# Patient Record
Sex: FEMALE | Race: WHITE | NOT HISPANIC OR LATINO | Employment: OTHER | URBAN - METROPOLITAN AREA
[De-identification: names, ages, dates, MRNs, and addresses within clinical notes are randomized per-mention and may not be internally consistent; named-entity substitution may affect disease eponyms.]

---

## 2017-07-06 ENCOUNTER — CONVERSION ENCOUNTER (OUTPATIENT)
Dept: OTHER | Facility: HOSPITAL | Age: 72
End: 2017-07-06

## 2017-07-07 ENCOUNTER — CONVERSION ENCOUNTER (OUTPATIENT)
Dept: OTHER | Facility: HOSPITAL | Age: 72
End: 2017-07-07

## 2017-07-07 LAB
ALBUMIN SERPL-MCNC: 4.4 G/DL (ref 3.6–5.1)
ALBUMIN/GLOB SERPL: ABNORMAL {RATIO} (ref 1–2.5)
ALP SERPL-CCNC: 95 UNITS/L (ref 33–130)
ALT SERPL-CCNC: 14 UNITS/L (ref 6–29)
AST SERPL-CCNC: 22 UNITS/L (ref 10–35)
BASOPHILS # BLD AUTO: ABNORMAL 10*3/MM3 (ref 0–200)
BASOPHILS NFR BLD AUTO: 0.3 %
BILIRUB SERPL-MCNC: 0.8 MG/DL (ref 0.2–1.2)
BUN SERPL-MCNC: 18 MG/DL (ref 7–25)
BUN/CREAT SERPL: ABNORMAL (ref 6–22)
CALCIUM SERPL-MCNC: 9.5 MG/DL (ref 8.6–10.4)
CHLORIDE SERPL-SCNC: 108 MMOL/L (ref 98–110)
CO2 CONTENT VENOUS: 23 MMOL/L (ref 20–31)
CONV NEUTROPHILS/100 LEUKOCYTES IN BODY FLUID BY MANUAL COUNT: 65.9 %
CONV TOTAL PROTEIN: 7.1 G/DL (ref 6.1–8.1)
CREAT UR-MCNC: 0.51 MG/DL (ref 0.6–0.93)
EOSINOPHIL # BLD AUTO: 1.2 %
EOSINOPHIL # BLD AUTO: ABNORMAL 10*3/MM3 (ref 15–500)
ERYTHROCYTE [DISTWIDTH] IN BLOOD BY AUTOMATED COUNT: 12.9 % (ref 11–15)
FOLATE SERPL-MCNC: 20.2 NG/ML
GLOBULIN UR ELPH-MCNC: ABNORMAL G/DL (ref 1.9–3.7)
GLUCOSE SERPL-MCNC: 79 MG/DL (ref 65–99)
HCT VFR BLD AUTO: 42.6 % (ref 35–45)
HGB BLD-MCNC: 14.1 G/DL (ref 11.7–15.5)
LYMPHOCYTES # BLD AUTO: ABNORMAL 10*3/MM3 (ref 850–3900)
LYMPHOCYTES NFR BLD AUTO: 27 %
MCH RBC QN AUTO: 28.8 PG (ref 27–33)
MCHC RBC AUTO-ENTMCNC: ABNORMAL % (ref 32–36)
MCV RBC AUTO: 87.1 FL (ref 80–100)
MONOCYTES # BLD AUTO: ABNORMAL 10*3/MICROLITER (ref 200–950)
MONOCYTES NFR BLD AUTO: 5.6 %
NEUTROPHILS # BLD AUTO: ABNORMAL 10*3/MM3 (ref 1500–7800)
PLATELET # BLD AUTO: ABNORMAL 10*3/MM3 (ref 140–400)
PMV BLD AUTO: 10.6 FL (ref 7.5–12.5)
POTASSIUM SERPL-SCNC: 3.9 MMOL/L (ref 3.5–5.3)
RBC # BLD AUTO: ABNORMAL 10*6/MM3 (ref 3.8–5.1)
SODIUM SERPL-SCNC: 142 MMOL/L (ref 135–146)
TSH SERPL-ACNC: 1.22 MICROINTL UNITS/ML (ref 0.4–4.5)
VIT B12 SERPL-MCNC: 224 PG/ML (ref 200–1100)
WBC # BLD AUTO: ABNORMAL K/UL (ref 3.8–10.8)

## 2017-07-10 ENCOUNTER — HOSPITAL ENCOUNTER (OUTPATIENT)
Dept: MRI IMAGING | Facility: HOSPITAL | Age: 72
Discharge: HOME | End: 2017-07-10
Attending: FAMILY MEDICINE | Admitting: FAMILY MEDICINE

## 2017-09-08 ENCOUNTER — CONVERSION ENCOUNTER (OUTPATIENT)
Dept: OTHER | Facility: HOSPITAL | Age: 72
End: 2017-09-08

## 2017-10-17 ENCOUNTER — CONVERSION ENCOUNTER (OUTPATIENT)
Dept: OTHER | Facility: HOSPITAL | Age: 72
End: 2017-10-17

## 2018-01-09 ENCOUNTER — CONVERSION ENCOUNTER (OUTPATIENT)
Dept: OTHER | Facility: HOSPITAL | Age: 73
End: 2018-01-09

## 2018-04-17 ENCOUNTER — CONVERSION ENCOUNTER (OUTPATIENT)
Dept: OTHER | Facility: HOSPITAL | Age: 73
End: 2018-04-17

## 2018-05-31 ENCOUNTER — HOSPITAL ENCOUNTER (OUTPATIENT)
Dept: PREADMISSION TESTING | Facility: HOSPITAL | Age: 73
Discharge: HOME | End: 2018-05-31
Attending: OBSTETRICS & GYNECOLOGY | Admitting: OBSTETRICS & GYNECOLOGY

## 2018-05-31 LAB
ANION GAP SERPL CALC-SCNC: 10.7 MMOL/L (ref 10–20)
BACTERIA SPEC AEROBE CULT: NORMAL
BASOPHILS # BLD AUTO: 0 10*3/UL (ref 0–0.2)
BASOPHILS NFR BLD AUTO: 0 % (ref 0–2)
BILIRUB UR QL STRIP: NEGATIVE MG/DL
BUN SERPL-MCNC: 13 MG/DL (ref 8–20)
BUN/CREAT SERPL: 21.7 (ref 5.4–26.2)
CALCIUM SERPL-MCNC: 8.7 MG/DL (ref 8.9–10.3)
CASTS URNS QL MICRO: ABNORMAL /[LPF]
CHLORIDE SERPL-SCNC: 105 MMOL/L (ref 101–111)
COLOR UR: YELLOW
CONV BACTERIA IN URINE MICRO: NEGATIVE
CONV CLARITY OF URINE: CLEAR
CONV CO2: 28 MMOL/L (ref 22–32)
CONV HYALINE CASTS IN URINE MICRO: ABNORMAL /[LPF] (ref 0–5)
CONV PROTEIN IN URINE BY AUTOMATED TEST STRIP: NEGATIVE MG/DL
CONV SMALL ROUND CELLS: ABNORMAL /[HPF]
CONV UROBILINOGEN IN URINE BY AUTOMATED TEST STRIP: 0.2 MG/DL
CREAT UR-MCNC: 0.6 MG/DL (ref 0.4–1)
CULTURE INDICATED?: ABNORMAL
DIFFERENTIAL METHOD BLD: (no result)
EOSINOPHIL # BLD AUTO: 0 10*3/UL (ref 0–0.3)
EOSINOPHIL # BLD AUTO: 1 % (ref 0–3)
ERYTHROCYTE [DISTWIDTH] IN BLOOD BY AUTOMATED COUNT: 14.2 % (ref 11.5–14.5)
GLUCOSE SERPL-MCNC: 92 MG/DL (ref 65–99)
GLUCOSE UR QL: NEGATIVE MG/DL
HCT VFR BLD AUTO: 40 % (ref 35–49)
HGB BLD-MCNC: 13.2 G/DL (ref 12–15)
HGB UR QL STRIP: ABNORMAL
KETONES UR QL STRIP: NEGATIVE MG/DL
LEUKOCYTE ESTERASE UR QL STRIP: ABNORMAL
LYMPHOCYTES # BLD AUTO: 1.3 10*3/UL (ref 0.8–4.8)
LYMPHOCYTES NFR BLD AUTO: 22 % (ref 18–42)
Lab: NORMAL
MCH RBC QN AUTO: 29.8 PG (ref 26–32)
MCHC RBC AUTO-ENTMCNC: 33.1 G/DL (ref 32–36)
MCV RBC AUTO: 90.1 FL (ref 80–94)
MICRO REPORT STATUS: NORMAL
MONOCYTES # BLD AUTO: 0.3 10*3/UL (ref 0.1–1.3)
MONOCYTES NFR BLD AUTO: 5 % (ref 2–11)
NEUTROPHILS # BLD AUTO: 4.2 10*3/UL (ref 2.3–8.6)
NEUTROPHILS NFR BLD AUTO: 72 % (ref 50–75)
NITRITE UR QL STRIP: NEGATIVE
NRBC BLD AUTO-RTO: 0 /100{WBCS}
NRBC/RBC NFR BLD MANUAL: 0 10*3/UL
PH UR STRIP.AUTO: 6 [PH] (ref 4.5–8)
PLATELET # BLD AUTO: 145 10*3/UL (ref 150–450)
PMV BLD AUTO: 8.6 FL (ref 7.4–10.4)
POTASSIUM SERPL-SCNC: 3.7 MMOL/L (ref 3.6–5.1)
RBC # BLD AUTO: 4.44 10*6/UL (ref 4–5.4)
RBC #/AREA URNS HPF: 2 /[HPF] (ref 0–3)
SODIUM SERPL-SCNC: 140 MMOL/L (ref 136–144)
SP GR UR: 1.02 (ref 1–1.03)
SPECIMEN SOURCE: NORMAL
SPERM URNS QL MICRO: ABNORMAL /[HPF]
SQUAMOUS SPT QL MICRO: 13 /[HPF] (ref 0–5)
UNIDENT CRYS URNS QL MICRO: ABNORMAL /[HPF]
WBC # BLD AUTO: 5.8 10*3/UL (ref 4.5–11.5)
WBC #/AREA URNS HPF: 26 /[HPF] (ref 0–5)
YEAST SPEC QL WET PREP: ABNORMAL /[HPF]

## 2018-06-04 ENCOUNTER — HOSPITAL ENCOUNTER (OUTPATIENT)
Dept: OTHER | Facility: HOSPITAL | Age: 73
Setting detail: SPECIMEN
Discharge: HOME | End: 2018-06-04
Attending: OBSTETRICS & GYNECOLOGY | Admitting: OBSTETRICS & GYNECOLOGY

## 2018-08-13 ENCOUNTER — OFFICE (OUTPATIENT)
Dept: URBAN - METROPOLITAN AREA CLINIC 64 | Facility: CLINIC | Age: 73
End: 2018-08-13

## 2018-08-13 VITALS
SYSTOLIC BLOOD PRESSURE: 111 MMHG | DIASTOLIC BLOOD PRESSURE: 56 MMHG | HEIGHT: 62 IN | WEIGHT: 134 LBS | HEART RATE: 69 BPM

## 2018-08-13 DIAGNOSIS — Z12.11 ENCOUNTER FOR SCREENING FOR MALIGNANT NEOPLASM OF COLON: ICD-10-CM

## 2018-08-13 DIAGNOSIS — K83.9 DISEASE OF BILIARY TRACT, UNSPECIFIED: ICD-10-CM

## 2018-08-13 PROCEDURE — 99204 OFFICE O/P NEW MOD 45 MIN: CPT | Performed by: INTERNAL MEDICINE

## 2018-10-22 ENCOUNTER — CONVERSION ENCOUNTER (OUTPATIENT)
Dept: FAMILY MEDICINE CLINIC | Facility: CLINIC | Age: 73
End: 2018-10-22

## 2018-10-23 LAB
ALBUMIN SERPL-MCNC: 3.9 G/DL (ref 3.6–5.1)
ALP SERPL-CCNC: 90 U/L (ref 33–130)
AST SERPL-CCNC: 21 U/L (ref 10–35)
BILIRUB SERPL-MCNC: 0.6 MG/DL (ref 0.2–1.2)
BUN SERPL-MCNC: 15 MG/DL (ref 7–25)
BUN/CREAT SERPL: 30 (CALC) (ref 6–22)
CALCIUM SERPL-MCNC: 9 MG/DL (ref 8.6–10.2)
CHLORIDE SERPL-SCNC: 104 MMOL/L (ref 98–110)
CHOLEST SERPL-MCNC: 179 MG/DL (ref 125–200)
CONV CO2: 26 MMOL/L (ref 21–33)
CONV TOTAL PROTEIN: 6.4 G/DL (ref 6.2–8.3)
CREAT UR-MCNC: 0.5 MG/DL (ref 0.63–1.22)
EOSINOPHIL # BLD AUTO: 1 %
EOSINOPHIL # BLD AUTO: 100 CELLS/UL (ref 15–500)
ERYTHROCYTE [DISTWIDTH] IN BLOOD BY AUTOMATED COUNT: 14.2 % (ref 11–15)
FOLATE SERPL-MCNC: 13.7 NG/ML
GLOBULIN UR ELPH-MCNC: 2.5 MG/DL (ref 2.2–3.9)
GLUCOSE UR QL: 97 MG/DL (ref 65–99)
HCT VFR BLD AUTO: 40.6 % (ref 35–45)
HDLC SERPL-MCNC: 73 MG/DL
HGB BLD-MCNC: 13.5 G/DL (ref 11.7–15.5)
INSULIN SERPL-ACNC: 1.6 (CALC) (ref 1–2.1)
LDLC SERPL CALC-MCNC: 95 MG/DL
LYMPHOCYTES # BLD AUTO: 1700 CELLS/UL (ref 850–3900)
LYMPHOCYTES NFR BLD AUTO: 24 %
MCH RBC QN AUTO: 29.9 PG (ref 27–33)
MCHC RBC AUTO-ENTMCNC: 33.3 G/DL (ref 32–36)
MCV RBC AUTO: 90 FL (ref 80–100)
MONOCYTES # BLD AUTO: 400 CELLS/UL (ref 200–950)
MONOCYTES NFR BLD AUTO: 5 %
NEUTROPHILS # BLD AUTO: 4900 CELLS/UL (ref 1500–7800)
NEUTROPHILS NFR BLD AUTO: 69 %
PLATELET # BLD AUTO: 148 10*3/UL (ref 140–400)
PMV BLD AUTO: 9.1 FL (ref 7.5–11.5)
POTASSIUM SERPL-SCNC: 3.8 MMOL/L (ref 3.5–5.3)
RBC # BLD AUTO: 4.51 MILLION/UL (ref 3.8–5.1)
SODIUM SERPL-SCNC: 141 MMOL/L (ref 135–146)
TRIGL SERPL-MCNC: 56 MG/DL
TSH SERPL-ACNC: 0.8 MIU/L (ref 0.4–4.5)
VIT B12 SERPL-MCNC: 202 PG/ML (ref 200–1100)
WBC # BLD AUTO: 7.1 10*3/UL (ref 3.8–10.8)

## 2018-11-12 ENCOUNTER — OFFICE (OUTPATIENT)
Dept: URBAN - METROPOLITAN AREA CLINIC 64 | Facility: CLINIC | Age: 73
End: 2018-11-12

## 2018-11-12 VITALS
WEIGHT: 132 LBS | DIASTOLIC BLOOD PRESSURE: 72 MMHG | HEART RATE: 70 BPM | HEIGHT: 62 IN | SYSTOLIC BLOOD PRESSURE: 110 MMHG

## 2018-11-12 DIAGNOSIS — Z12.11 ENCOUNTER FOR SCREENING FOR MALIGNANT NEOPLASM OF COLON: ICD-10-CM

## 2018-11-12 DIAGNOSIS — K83.9 DISEASE OF BILIARY TRACT, UNSPECIFIED: ICD-10-CM

## 2018-11-12 PROCEDURE — 99213 OFFICE O/P EST LOW 20 MIN: CPT | Performed by: INTERNAL MEDICINE

## 2019-02-12 ENCOUNTER — CONVERSION ENCOUNTER (OUTPATIENT)
Dept: OTHER | Facility: HOSPITAL | Age: 74
End: 2019-02-12

## 2019-02-27 ENCOUNTER — HOSPITAL ENCOUNTER (OUTPATIENT)
Dept: OTHER | Facility: HOSPITAL | Age: 74
Discharge: HOME | End: 2019-02-27
Attending: FAMILY MEDICINE | Admitting: FAMILY MEDICINE

## 2019-06-04 VITALS
SYSTOLIC BLOOD PRESSURE: 120 MMHG | WEIGHT: 138 LBS | BODY MASS INDEX: 24.45 KG/M2 | OXYGEN SATURATION: 96 % | SYSTOLIC BLOOD PRESSURE: 115 MMHG | HEART RATE: 73 BPM | OXYGEN SATURATION: 95 % | DIASTOLIC BLOOD PRESSURE: 77 MMHG | RESPIRATION RATE: 16 BRPM | WEIGHT: 141.5 LBS | SYSTOLIC BLOOD PRESSURE: 113 MMHG | OXYGEN SATURATION: 96 % | WEIGHT: 129.5 LBS | WEIGHT: 136 LBS | RESPIRATION RATE: 16 BRPM | SYSTOLIC BLOOD PRESSURE: 119 MMHG | HEIGHT: 61 IN | DIASTOLIC BLOOD PRESSURE: 72 MMHG | HEART RATE: 78 BPM | SYSTOLIC BLOOD PRESSURE: 107 MMHG | OXYGEN SATURATION: 98 % | BODY MASS INDEX: 25.68 KG/M2 | HEART RATE: 80 BPM | DIASTOLIC BLOOD PRESSURE: 80 MMHG | HEIGHT: 61 IN | HEART RATE: 76 BPM | RESPIRATION RATE: 16 BRPM | HEIGHT: 61 IN | RESPIRATION RATE: 16 BRPM | WEIGHT: 140 LBS | BODY MASS INDEX: 26.43 KG/M2 | BODY MASS INDEX: 26.06 KG/M2 | DIASTOLIC BLOOD PRESSURE: 80 MMHG | OXYGEN SATURATION: 99 % | HEART RATE: 78 BPM | DIASTOLIC BLOOD PRESSURE: 70 MMHG | DIASTOLIC BLOOD PRESSURE: 81 MMHG | WEIGHT: 143 LBS | HEIGHT: 61 IN | HEART RATE: 88 BPM | RESPIRATION RATE: 16 BRPM | SYSTOLIC BLOOD PRESSURE: 121 MMHG

## 2019-07-12 ENCOUNTER — OFFICE VISIT (OUTPATIENT)
Dept: FAMILY MEDICINE CLINIC | Facility: CLINIC | Age: 74
End: 2019-07-12

## 2019-07-12 VITALS
RESPIRATION RATE: 18 BRPM | WEIGHT: 137.4 LBS | HEART RATE: 67 BPM | BODY MASS INDEX: 24.34 KG/M2 | HEIGHT: 63 IN | TEMPERATURE: 97.1 F | SYSTOLIC BLOOD PRESSURE: 102 MMHG | DIASTOLIC BLOOD PRESSURE: 70 MMHG | OXYGEN SATURATION: 98 %

## 2019-07-12 DIAGNOSIS — R19.7 DIARRHEA, UNSPECIFIED TYPE: ICD-10-CM

## 2019-07-12 DIAGNOSIS — R41.3 MEMORY LOSS: ICD-10-CM

## 2019-07-12 DIAGNOSIS — R35.0 URINARY FREQUENCY: Primary | ICD-10-CM

## 2019-07-12 DIAGNOSIS — K62.5 BRIGHT RED BLOOD PER RECTUM: ICD-10-CM

## 2019-07-12 LAB
BILIRUB BLD-MCNC: NEGATIVE MG/DL
CLARITY, POC: CLEAR
COLOR UR: YELLOW
GLUCOSE UR STRIP-MCNC: NEGATIVE MG/DL
KETONES UR QL: NEGATIVE
LEUKOCYTE EST, POC: NEGATIVE
NITRITE UR-MCNC: NEGATIVE MG/ML
PH UR: 5 [PH] (ref 5–8)
PROT UR STRIP-MCNC: NEGATIVE MG/DL
RBC # UR STRIP: ABNORMAL /UL
SP GR UR: 1.01 (ref 1–1.03)
UROBILINOGEN UR QL: NORMAL

## 2019-07-12 PROCEDURE — 81002 URINALYSIS NONAUTO W/O SCOPE: CPT | Performed by: FAMILY MEDICINE

## 2019-07-12 PROCEDURE — 99214 OFFICE O/P EST MOD 30 MIN: CPT | Performed by: FAMILY MEDICINE

## 2019-07-12 RX ORDER — GALANTAMINE HYDROBROMIDE 4 MG/1
1 TABLET, FILM COATED ORAL EVERY 12 HOURS
COMMUNITY
Start: 2018-01-09 | End: 2019-07-18 | Stop reason: SINTOL

## 2019-07-12 RX ORDER — CITALOPRAM 20 MG/1
1 TABLET ORAL DAILY
COMMUNITY
Start: 2017-07-06 | End: 2019-11-18 | Stop reason: SDUPTHER

## 2019-07-12 RX ORDER — ALENDRONATE SODIUM 70 MG/1
70 TABLET ORAL
COMMUNITY
End: 2020-03-25

## 2019-07-12 RX ORDER — MEMANTINE HYDROCHLORIDE 5 MG/1
10 TABLET ORAL DAILY
COMMUNITY
Start: 2018-01-09 | End: 2020-03-31

## 2019-07-12 NOTE — PROGRESS NOTES
Subjective   Salud Gama is a 74 y.o. female.     Salud was last seen in office by Dr. Anay Rosales on 04/09/2019 for urinary tract infection. She was prescribed Bactrim with good relief.      Urinary Tract Infection    This is a chronic problem. The current episode started more than 1 month ago. The problem has been waxing and waning. The quality of the pain is described as burning. The pain is at a severity of 4/10. The pain is moderate. There has been no fever. Associated symptoms include frequency, hematuria and urgency. Pertinent negatives include no discharge, nausea or vomiting. She has tried antibiotics for the symptoms. The treatment provided moderate relief.   Rectal Bleeding   This is a new problem. The current episode started yesterday. The problem occurs rarely (x 1 while she was sitting in the bath tub). The problem has been resolved. Pertinent negatives include no abdominal pain, chest pain, congestion, coughing, fatigue, fever, joint swelling, myalgias, nausea, rash, sore throat or vomiting.        The following portions of the patient's history were reviewed and updated as appropriate: allergies, current medications, past family history, past medical history, past social history, past surgical history and problem list.    Review of Systems   Constitutional: Negative for appetite change, fatigue and fever.   HENT: Negative for congestion, ear pain, sinus pressure, sore throat and tinnitus.    Respiratory: Negative for cough, shortness of breath and wheezing.    Cardiovascular: Negative for chest pain, palpitations and leg swelling.   Gastrointestinal: Positive for hematochezia. Negative for abdominal pain, constipation, diarrhea, nausea, rectal pain (There has been rectal blood in the bath tub) and vomiting.   Endocrine: Negative.  Negative for cold intolerance, heat intolerance, polydipsia and polyphagia.   Genitourinary: Positive for frequency, hematuria and urgency. Negative for dysuria and  "pelvic pain.   Musculoskeletal: Negative for joint swelling and myalgias.   Skin: Negative for rash.   Neurological: Negative for dizziness, syncope and headache.   Hematological: Negative for adenopathy. Does not bruise/bleed easily.   Psychiatric/Behavioral: Negative for depressed mood.       Objective   Visit Vitals  /70 (BP Location: Left arm, Patient Position: Sitting, Cuff Size: Adult)   Pulse 67   Temp 97.1 °F (36.2 °C) (Oral)   Resp 18   Ht 160 cm (63\")   Wt 62.3 kg (137 lb 6.4 oz)   LMP 01/01/1976   SpO2 98% Comment: Room air   Breastfeeding? No   BMI 24.34 kg/m²     Physical Exam   Constitutional: She is oriented to person, place, and time. She appears well-developed and well-nourished. No distress.   HENT:   Right Ear: Tympanic membrane and external ear normal.   Left Ear: Tympanic membrane and external ear normal.   Mouth/Throat: Oropharynx is clear and moist.   Eyes: Conjunctivae are normal.   Neck: Neck supple. No JVD present. No thyromegaly present.   Cardiovascular: Normal rate, regular rhythm, normal heart sounds and intact distal pulses. Exam reveals no gallop and no friction rub.   No murmur heard.  Pulmonary/Chest: Effort normal and breath sounds normal. No respiratory distress. She has no wheezes. She has no rales.   Abdominal: Soft. Bowel sounds are normal. She exhibits no distension and no mass. There is no tenderness. A hernia is present. Hernia confirmed positive in the right inguinal area and confirmed positive in the left inguinal area.   Genitourinary: Rectum normal. Rectal exam shows no tenderness and guaiac negative stool. Pelvic exam was performed with patient prone. There is no rash on the right labia. There is no rash on the left labia. Uterus is absent.   Cervix is absent. Right adnexum displays no mass and no tenderness. Left adnexum displays no mass and no tenderness. No erythema in the vagina. No foreign body in the vagina. No signs of injury around the vagina. "   Musculoskeletal: She exhibits no edema.   Lymphadenopathy:     She has no cervical adenopathy.   Neurological: She is alert and oriented to person, place, and time. She displays normal reflexes. Coordination normal.   She has recently seen Seiple for memory loss which remains stable. Her  reports that she is doing fairly well    Skin: No rash noted. No erythema.   Psychiatric: She has a normal mood and affect.   Vitals reviewed.        Assessment/Plan   Problem List Items Addressed This Visit        Other    Memory loss      Other Visit Diagnoses     Urinary frequency    -  Primary    Relevant Orders    POCT urinalysis dipstick, manual (Completed)    Urine Culture - Urine, Urine, Clean Catch (Completed)    Diarrhea, unspecified type        likely etiology of  blood per rectum negative exam colonscopy uncertain will check & arrange as indicated  Reassurance & follow w further workup if sxs continue    Bright red blood per rectum              There are no Patient Instructions on file for this visit.

## 2019-07-14 LAB
BACTERIA UR CULT: NO GROWTH
BACTERIA UR CULT: NORMAL

## 2019-07-18 ENCOUNTER — OFFICE VISIT (OUTPATIENT)
Dept: NEUROLOGY | Facility: CLINIC | Age: 74
End: 2019-07-18

## 2019-07-18 VITALS
BODY MASS INDEX: 25.79 KG/M2 | HEART RATE: 83 BPM | SYSTOLIC BLOOD PRESSURE: 115 MMHG | HEIGHT: 61 IN | WEIGHT: 136.6 LBS | DIASTOLIC BLOOD PRESSURE: 64 MMHG

## 2019-07-18 DIAGNOSIS — R41.3 MEMORY LOSS: Primary | ICD-10-CM

## 2019-07-18 PROCEDURE — 99213 OFFICE O/P EST LOW 20 MIN: CPT | Performed by: PSYCHIATRY & NEUROLOGY

## 2019-07-18 NOTE — PROGRESS NOTES
Subjective:     Patient ID: Salud Gama is a 74 y.o. female.    Memory Loss    4 month f/u, patient has mild short term memory problems, and other cognitive problems currently not taking namenda 5 mg bid  Or  Razadyne.  due to UTI and medication bothered her stomach     lab results.  B12 202      Patient onset symptoms occurred gradually no family hx of dementia.     Patient states all symptoms are about the same, patient is not driving did not renew her drivers license. Patient feels like she's doing well still has some confusion.     The following portions of the patient's history were reviewed and updated as appropriate: allergies, current medications, past family history, past medical history, past social history, past surgical history and problem list.      Current Outpatient Medications:   •  alendronate (FOSAMAX) 70 MG tablet, Take 70 mg by mouth Every 7 (Seven) Days., Disp: , Rfl:   •  Calcium Carb-Cholecalciferol 600-200 MG-UNIT tablet, Take 1 tablet by mouth Daily., Disp: , Rfl:   •  citalopram (CeleXA) 20 MG tablet, Take 1 tablet by mouth Daily., Disp: , Rfl:   •  Phenazopyridine HCl (AZO TABS PO), Take 1 tablet by mouth As Needed (urinary frequency)., Disp: , Rfl:   •  memantine (NAMENDA) 5 MG tablet, Take 1 tablet by mouth 2 (Two) Times a Day., Disp: , Rfl:     Review of Systems   Constitutional: Negative for appetite change and fatigue.   HENT: Negative for congestion, sinus pressure and sinus pain.    Eyes: Negative for pain and itching.   Respiratory: Negative for choking and shortness of breath.    Cardiovascular: Negative for chest pain and palpitations.   Gastrointestinal: Positive for constipation and diarrhea.   Endocrine: Negative for cold intolerance and heat intolerance.   Genitourinary: Positive for frequency. Negative for difficulty urinating.   Musculoskeletal: Positive for gait problem. Negative for back pain.   Allergic/Immunologic: Negative for environmental allergies.  "  Neurological: Negative for dizziness, tremors, seizures, syncope, facial asymmetry, speech difficulty, weakness, light-headedness, numbness and headaches.   Psychiatric/Behavioral: Positive for confusion. Negative for agitation.          I have reviewed ROS completed by medical assistant.     Objective:    Neurologic Exam     Mental Status   Oriented to person, place, and time.   Level of consciousness: alert      Physical Exam   Constitutional: She is oriented to person, place, and time.   Neurological: She is oriented to person, place, and time.       Assessment/Plan:    Salud was seen today for memory loss.    Diagnoses and all orders for this visit:    Memory loss      Memory stable.   Recommend  B-12 OTC  will check vitamin D  Exercise   Resume taking namenda increase to 10mg   Stop the ryzadyne due to side effects  Recommend [employer] mental exercise    >50% of 15 min visit re memory problems    Maximum   Score Patient's   Score Questions   5 5 \"What is the year?Season?Date?Day of the week?Month?\"   5 5 \"Where are we now: State?County?Town/city?Hospital?Floor?\"   3 3 3 Unrelated objects Number of trials:___   5 5 Count backward from 100 by sevens or spell WORLD backwards   3 3 Name 3 things from above   2 2 Identify 2 objects   1 1 Repeat the phrase: No ifs, ands,or buts.   3 3 Take paper in right hand, fold it in half, and put it on the floor.   1 1 Please read this and do what it says. \"Close your eyes\"   1 1 Make up and write a sentence about anything. Noun and verb   1 1 Copy this picture 10 angles must be present.   30 30 Total MMSE         This document has been electronically signed by Joseph Seipel, MD on July 18, 2019 10:58 AM        "

## 2019-07-22 ENCOUNTER — TELEPHONE (OUTPATIENT)
Dept: FAMILY MEDICINE CLINIC | Facility: CLINIC | Age: 74
End: 2019-07-22

## 2019-07-22 NOTE — TELEPHONE ENCOUNTER
Spoke with Salud and her daughter Miranda. Salud reports she continue to have rectal bleeding  Not as often but atleast 2-3 x a week.    Colonoscopy packet  From [facility]  Was mailed to daughter Miranda  She will help  Complete and arrange  The colonoscopy. Anibal filed.

## 2019-07-23 ENCOUNTER — TELEPHONE (OUTPATIENT)
Dept: FAMILY MEDICINE CLINIC | Facility: CLINIC | Age: 74
End: 2019-07-23

## 2019-07-25 PROBLEM — Z12.4 SCREENING FOR MALIGNANT NEOPLASM OF CERVIX: Status: ACTIVE | Noted: 2019-07-25

## 2019-07-25 PROBLEM — Z12.31 ENCOUNTER FOR SCREENING MAMMOGRAM FOR MALIGNANT NEOPLASM OF BREAST: Status: ACTIVE | Noted: 2019-07-25

## 2019-07-25 PROBLEM — Z12.12 ENCOUNTER FOR COLORECTAL CANCER SCREENING: Status: ACTIVE | Noted: 2019-07-25

## 2019-07-25 PROBLEM — Z00.01 ENCOUNTER FOR ROUTINE ADULT MEDICAL EXAM WITH ABNORMAL FINDINGS: Status: ACTIVE | Noted: 2019-07-25

## 2019-07-25 PROBLEM — Z12.11 ENCOUNTER FOR COLORECTAL CANCER SCREENING: Status: ACTIVE | Noted: 2019-07-25

## 2019-07-26 ENCOUNTER — OFFICE VISIT (OUTPATIENT)
Dept: FAMILY MEDICINE CLINIC | Facility: CLINIC | Age: 74
End: 2019-07-26

## 2019-07-26 VITALS
SYSTOLIC BLOOD PRESSURE: 124 MMHG | WEIGHT: 134.6 LBS | HEIGHT: 61 IN | TEMPERATURE: 98.6 F | OXYGEN SATURATION: 98 % | HEART RATE: 72 BPM | BODY MASS INDEX: 25.41 KG/M2 | DIASTOLIC BLOOD PRESSURE: 70 MMHG | RESPIRATION RATE: 16 BRPM

## 2019-07-26 DIAGNOSIS — L82.0 INFLAMED SEBORRHEIC KERATOSIS: ICD-10-CM

## 2019-07-26 DIAGNOSIS — Z87.898 HISTORY OF CHEST PAIN: Primary | ICD-10-CM

## 2019-07-26 DIAGNOSIS — L29.9 ITCHING: ICD-10-CM

## 2019-07-26 DIAGNOSIS — R41.3 MEMORY LOSS: ICD-10-CM

## 2019-07-26 PROCEDURE — 11300 SHAVE SKIN LESION 0.5 CM/<: CPT | Performed by: FAMILY MEDICINE

## 2019-07-26 PROCEDURE — 99213 OFFICE O/P EST LOW 20 MIN: CPT | Performed by: FAMILY MEDICINE

## 2019-07-26 PROCEDURE — 11301 SHAVE SKIN LESION 0.6-1.0 CM: CPT | Performed by: FAMILY MEDICINE

## 2019-07-26 RX ORDER — UBIDECARENONE 75 MG
50 CAPSULE ORAL DAILY
COMMUNITY

## 2019-07-31 ENCOUNTER — TELEPHONE (OUTPATIENT)
Dept: FAMILY MEDICINE CLINIC | Facility: CLINIC | Age: 74
End: 2019-07-31

## 2019-08-07 LAB
DX ICD CODE: NORMAL
PATH REPORT.FINAL DX SPEC: NORMAL
PATH REPORT.GROSS SPEC: NORMAL
PATH REPORT.SITE OF ORIGIN SPEC: NORMAL
PATHOLOGIST NAME: NORMAL
PAYMENT PROCEDURE: NORMAL

## 2019-10-31 ENCOUNTER — FLU SHOT (OUTPATIENT)
Dept: FAMILY MEDICINE CLINIC | Facility: CLINIC | Age: 74
End: 2019-10-31

## 2019-10-31 DIAGNOSIS — Z23 IMMUNIZATION, PNEUMOCOCCUS AND INFLUENZA: ICD-10-CM

## 2019-10-31 PROCEDURE — 90653 IIV ADJUVANT VACCINE IM: CPT | Performed by: FAMILY MEDICINE

## 2019-10-31 PROCEDURE — G0008 ADMIN INFLUENZA VIRUS VAC: HCPCS | Performed by: FAMILY MEDICINE

## 2019-11-19 RX ORDER — CITALOPRAM 20 MG/1
20 TABLET ORAL DAILY
Qty: 30 TABLET | Refills: 12 | Status: SHIPPED | OUTPATIENT
Start: 2019-11-19 | End: 2020-11-30

## 2019-12-06 ENCOUNTER — OFFICE (OUTPATIENT)
Dept: URBAN - METROPOLITAN AREA CLINIC 64 | Facility: CLINIC | Age: 74
End: 2019-12-06

## 2019-12-06 VITALS
SYSTOLIC BLOOD PRESSURE: 117 MMHG | HEART RATE: 65 BPM | DIASTOLIC BLOOD PRESSURE: 79 MMHG | HEIGHT: 62 IN | WEIGHT: 137 LBS

## 2019-12-06 DIAGNOSIS — K83.9 DISEASE OF BILIARY TRACT, UNSPECIFIED: ICD-10-CM

## 2019-12-06 DIAGNOSIS — R10.31 RIGHT LOWER QUADRANT PAIN: ICD-10-CM

## 2019-12-06 PROCEDURE — 99214 OFFICE O/P EST MOD 30 MIN: CPT | Performed by: INTERNAL MEDICINE

## 2020-03-25 ENCOUNTER — PATIENT MESSAGE (OUTPATIENT)
Dept: FAMILY MEDICINE CLINIC | Facility: CLINIC | Age: 75
End: 2020-03-25

## 2020-03-31 RX ORDER — MEMANTINE HYDROCHLORIDE 10 MG/1
10 TABLET ORAL DAILY
Qty: 30 TABLET | Refills: 4 | Status: SHIPPED | OUTPATIENT
Start: 2020-03-31 | End: 2020-08-26

## 2020-04-03 ENCOUNTER — PATIENT MESSAGE (OUTPATIENT)
Dept: FAMILY MEDICINE CLINIC | Facility: CLINIC | Age: 75
End: 2020-04-03

## 2020-04-03 DIAGNOSIS — R35.0 URINARY FREQUENCY: Primary | ICD-10-CM

## 2020-04-03 RX ORDER — CEPHALEXIN 500 MG/1
500 CAPSULE ORAL 3 TIMES DAILY
Qty: 21 CAPSULE | Refills: 0 | Status: SHIPPED | OUTPATIENT
Start: 2020-04-03 | End: 2020-06-23

## 2020-04-12 RX ORDER — MEMANTINE HYDROCHLORIDE 10 MG/1
10 TABLET ORAL DAILY
Qty: 30 TABLET | Refills: 4 | Status: CANCELLED | OUTPATIENT
Start: 2020-04-12

## 2020-04-21 ENCOUNTER — TRANSCRIBE ORDERS (OUTPATIENT)
Dept: ADMINISTRATIVE | Facility: HOSPITAL | Age: 75
End: 2020-04-21

## 2020-04-21 DIAGNOSIS — Z12.31 BREAST CANCER SCREENING BY MAMMOGRAM: Primary | ICD-10-CM

## 2020-05-19 ENCOUNTER — APPOINTMENT (OUTPATIENT)
Dept: MAMMOGRAPHY | Facility: HOSPITAL | Age: 75
End: 2020-05-19

## 2020-06-03 ENCOUNTER — HOSPITAL ENCOUNTER (OUTPATIENT)
Dept: MAMMOGRAPHY | Facility: HOSPITAL | Age: 75
Discharge: HOME | End: 2020-06-03
Admitting: FAMILY MEDICINE

## 2020-06-03 DIAGNOSIS — Z12.31 BREAST CANCER SCREENING BY MAMMOGRAM: ICD-10-CM

## 2020-06-03 PROCEDURE — 77067 SCR MAMMO BI INCL CAD: CPT

## 2020-06-03 PROCEDURE — 77063 BREAST TOMOSYNTHESIS BI: CPT

## 2020-06-23 ENCOUNTER — OFFICE VISIT (OUTPATIENT)
Dept: FAMILY MEDICINE CLINIC | Facility: CLINIC | Age: 75
End: 2020-06-23

## 2020-06-23 ENCOUNTER — HOSPITAL ENCOUNTER (OUTPATIENT)
Dept: GENERAL RADIOLOGY | Facility: HOSPITAL | Age: 75
Discharge: HOME | End: 2020-06-23
Admitting: FAMILY MEDICINE

## 2020-06-23 VITALS
HEART RATE: 72 BPM | SYSTOLIC BLOOD PRESSURE: 120 MMHG | TEMPERATURE: 99.4 F | DIASTOLIC BLOOD PRESSURE: 60 MMHG | RESPIRATION RATE: 16 BRPM | OXYGEN SATURATION: 96 %

## 2020-06-23 DIAGNOSIS — S06.0X0A CONCUSSION WITHOUT LOSS OF CONSCIOUSNESS, INITIAL ENCOUNTER: ICD-10-CM

## 2020-06-23 DIAGNOSIS — R41.89 COGNITIVE DECLINE: ICD-10-CM

## 2020-06-23 DIAGNOSIS — S09.93XA FACIAL INJURY, INITIAL ENCOUNTER: Primary | ICD-10-CM

## 2020-06-23 PROCEDURE — 99214 OFFICE O/P EST MOD 30 MIN: CPT | Performed by: FAMILY MEDICINE

## 2020-06-23 PROCEDURE — 70200 X-RAY EXAM OF EYE SOCKETS: CPT

## 2020-06-23 RX ORDER — VITAMIN B COMPLEX
1 TABLET ORAL DAILY
COMMUNITY
Start: 2020-03-31

## 2020-07-04 PROBLEM — R41.89 COGNITIVE DECLINE: Status: ACTIVE | Noted: 2020-07-04

## 2020-07-15 ENCOUNTER — TRANSCRIBE ORDERS (OUTPATIENT)
Dept: PREADMISSION TESTING | Facility: HOSPITAL | Age: 75
End: 2020-07-15

## 2020-07-15 DIAGNOSIS — Z01.818 OTHER SPECIFIED PRE-OPERATIVE EXAMINATION: Primary | ICD-10-CM

## 2020-07-16 ENCOUNTER — HOSPITAL ENCOUNTER (OUTPATIENT)
Dept: GENERAL RADIOLOGY | Facility: HOSPITAL | Age: 75
Discharge: HOME | End: 2020-07-16

## 2020-07-16 ENCOUNTER — HOSPITAL ENCOUNTER (OUTPATIENT)
Dept: GENERAL RADIOLOGY | Facility: HOSPITAL | Age: 75
Discharge: HOME | End: 2020-07-16
Admitting: ORTHOPAEDIC SURGERY

## 2020-07-16 ENCOUNTER — APPOINTMENT (OUTPATIENT)
Dept: PREADMISSION TESTING | Facility: HOSPITAL | Age: 75
End: 2020-07-16

## 2020-07-16 VITALS
HEART RATE: 66 BPM | TEMPERATURE: 98.7 F | WEIGHT: 137.8 LBS | BODY MASS INDEX: 25.36 KG/M2 | HEIGHT: 62 IN | SYSTOLIC BLOOD PRESSURE: 104 MMHG | DIASTOLIC BLOOD PRESSURE: 65 MMHG | RESPIRATION RATE: 18 BRPM | OXYGEN SATURATION: 96 %

## 2020-07-16 DIAGNOSIS — M19.90 OSTEOARTHRITIS, UNSPECIFIED OSTEOARTHRITIS TYPE, UNSPECIFIED SITE: ICD-10-CM

## 2020-07-16 LAB
ABO GROUP BLD: NORMAL
ALBUMIN SERPL-MCNC: 4 G/DL (ref 3.5–5.2)
ALBUMIN/GLOB SERPL: 1.5 G/DL
ALP SERPL-CCNC: 99 U/L (ref 39–117)
ALT SERPL W P-5'-P-CCNC: 14 U/L (ref 1–33)
ANION GAP SERPL CALCULATED.3IONS-SCNC: 9 MMOL/L (ref 5–15)
AST SERPL-CCNC: 15 U/L (ref 1–32)
BILIRUB SERPL-MCNC: 0.3 MG/DL (ref 0–1.2)
BLD GP AB SCN SERPL QL: NEGATIVE
BUN SERPL-MCNC: 17 MG/DL (ref 8–23)
BUN/CREAT SERPL: 30.4 (ref 7–25)
CALCIUM SPEC-SCNC: 8.8 MG/DL (ref 8.6–10.5)
CHLORIDE SERPL-SCNC: 106 MMOL/L (ref 98–107)
CO2 SERPL-SCNC: 27 MMOL/L (ref 22–29)
CREAT SERPL-MCNC: 0.56 MG/DL (ref 0.57–1)
DEPRECATED RDW RBC AUTO: 41.6 FL (ref 37–54)
ERYTHROCYTE [DISTWIDTH] IN BLOOD BY AUTOMATED COUNT: 13.1 % (ref 12.3–15.4)
GFR SERPL CREATININE-BSD FRML MDRD: 106 ML/MIN/1.73
GLOBULIN UR ELPH-MCNC: 2.7 GM/DL
GLUCOSE SERPL-MCNC: 110 MG/DL (ref 65–99)
HBA1C MFR BLD: 5.7 % (ref 4.8–5.6)
HCT VFR BLD AUTO: 37.5 % (ref 34–46.6)
HGB BLD-MCNC: 12.7 G/DL (ref 12–15.9)
INR PPP: 1.06 (ref 0.9–1.1)
MCH RBC QN AUTO: 29.9 PG (ref 26.6–33)
MCHC RBC AUTO-ENTMCNC: 33.9 G/DL (ref 31.5–35.7)
MCV RBC AUTO: 88.2 FL (ref 79–97)
PLATELET # BLD AUTO: 157 10*3/MM3 (ref 140–450)
PMV BLD AUTO: 10.1 FL (ref 6–12)
POTASSIUM SERPL-SCNC: 4.1 MMOL/L (ref 3.5–5.2)
PROT SERPL-MCNC: 6.7 G/DL (ref 6–8.5)
PROTHROMBIN TIME: 13.7 SECONDS (ref 11.7–14.2)
RBC # BLD AUTO: 4.25 10*6/MM3 (ref 3.77–5.28)
RH BLD: POSITIVE
SODIUM SERPL-SCNC: 142 MMOL/L (ref 136–145)
T&S EXPIRATION DATE: NORMAL
WBC # BLD AUTO: 6.28 10*3/MM3 (ref 3.4–10.8)

## 2020-07-16 PROCEDURE — 80053 COMPREHEN METABOLIC PANEL: CPT | Performed by: ORTHOPAEDIC SURGERY

## 2020-07-16 PROCEDURE — 86900 BLOOD TYPING SEROLOGIC ABO: CPT | Performed by: ORTHOPAEDIC SURGERY

## 2020-07-16 PROCEDURE — 93005 ELECTROCARDIOGRAM TRACING: CPT

## 2020-07-16 PROCEDURE — A9270 NON-COVERED ITEM OR SERVICE: HCPCS | Performed by: ORTHOPAEDIC SURGERY

## 2020-07-16 PROCEDURE — 86901 BLOOD TYPING SEROLOGIC RH(D): CPT | Performed by: ORTHOPAEDIC SURGERY

## 2020-07-16 PROCEDURE — 73560 X-RAY EXAM OF KNEE 1 OR 2: CPT

## 2020-07-16 PROCEDURE — 85610 PROTHROMBIN TIME: CPT | Performed by: ORTHOPAEDIC SURGERY

## 2020-07-16 PROCEDURE — 83036 HEMOGLOBIN GLYCOSYLATED A1C: CPT | Performed by: ORTHOPAEDIC SURGERY

## 2020-07-16 PROCEDURE — 71046 X-RAY EXAM CHEST 2 VIEWS: CPT

## 2020-07-16 PROCEDURE — 86850 RBC ANTIBODY SCREEN: CPT | Performed by: ORTHOPAEDIC SURGERY

## 2020-07-16 PROCEDURE — 36415 COLL VENOUS BLD VENIPUNCTURE: CPT

## 2020-07-16 PROCEDURE — 93010 ELECTROCARDIOGRAM REPORT: CPT | Performed by: INTERNAL MEDICINE

## 2020-07-16 PROCEDURE — 85027 COMPLETE CBC AUTOMATED: CPT | Performed by: ORTHOPAEDIC SURGERY

## 2020-07-16 PROCEDURE — 63710000001 MUPIROCIN 2 % OINTMENT: Performed by: ORTHOPAEDIC SURGERY

## 2020-07-16 RX ORDER — NAPROXEN SODIUM 220 MG
220 TABLET ORAL 2 TIMES DAILY PRN
COMMUNITY
End: 2020-07-30 | Stop reason: HOSPADM

## 2020-07-16 ASSESSMENT — KOOS JR
KOOS JR SCORE: 50.012
KOOS JR SCORE: 15

## 2020-07-16 NOTE — DISCHARGE INSTRUCTIONS
Take the following medications the morning of surgery:  CITALOPRAM AND NAMENDA      General Instructions: CLEAR LIQUIDS UNTIL 8:00 AM MORNING OF SURGERY  • Do not eat solid food after midnight the night before surgery.  • You may drink clear liquids day of surgery but must stop at least one hour before your hospital arrival time.  • It is beneficial for you to have a clear drink that contains carbohydrates the day of surgery.  We suggest a 12 to 20 ounce bottle of Gatorade or Powerade for non-diabetic patients or a 12 to 20 ounce bottle of G2 or Powerade Zero for diabetic patients. (Pediatric patients, are not advised to drink a 12 to 20 ounce carbohydrate drink)    Clear liquids are liquids you can see through.  Nothing red in color.     Plain water                               Sports drinks  Sodas                                   Gelatin (Jell-O)  Fruit juices without pulp such as white grape juice and apple juice  Popsicles that contain no fruit or yogurt  Tea or coffee (no cream or milk added)  Gatorade / Powerade  G2 / Powerade Zero    • Infants may have breast milk up to four hours before surgery.  • Infants drinking formula may drink formula up to six hours before surgery.   • Patients who avoid smoking, chewing tobacco and alcohol for 4 weeks prior to surgery have a reduced risk of post-operative complications.  Quit smoking as many days before surgery as you can.  • Do not smoke, use chewing tobacco or drink alcohol the day of surgery.   • If applicable bring your C-PAP/ BI-PAP machine.  • Bring any papers given to you in the doctor’s office.  • Wear clean comfortable clothes.  • Do not wear contact lenses, false eyelashes or make-up.  Bring a case for your glasses.   • Bring crutches or walker if applicable.  • Remove all piercings.  Leave jewelry and any other valuables at home.  • Hair extensions with metal clips must be removed prior to surgery.  • The Pre-Admission Testing nurse will instruct you to  Stephenie bring medications if unable to obtain an accurate list in Pre-Admission Testing.        If you were given a blood bank ID arm band remember to bring it with you the day of surgery.    Preventing a Surgical Site Infection:  • For 2 to 3 days before surgery, avoid shaving with a razor because the razor can irritate skin and make it easier to develop an infection.    • Any areas of open skin can increase the risk of a post-operative wound infection by allowing bacteria to enter and travel throughout the body.  Notify your surgeon if you have any skin wounds / rashes even if it is not near the expected surgical site.  The area will need assessed to determine if surgery should be delayed until it is healed.  • The night prior to surgery shower using a fresh bar of anti-bacterial soap (such as Dial) and clean washcloth.  Sleep in a clean bed with clean clothing.  Do not allow pets to sleep with you.  • Shower on the morning of surgery using a fresh bar of anti-bacterial soap (such as Dial) and clean washcloth.  Dry with a clean towel and dress in clean clothing.  • Ask your surgeon if you will be receiving antibiotics prior to surgery.  • Make sure you, your family, and all healthcare providers clean their hands with soap and water or an alcohol based hand  before caring for you or your wound.    Day of surgery: 7/29/2020 ARRIVAL TIME 9:00 AM  Your arrival time is approximately two hours before your scheduled surgery time.  Upon arrival, a Pre-op nurse and Anesthesiologist will review your health history, obtain vital signs, and answer questions you may have.  The only belongings needed at this time will be a list of your home medications and if applicable your C-PAP/BI-PAP machine.  If you are staying overnight your family can leave the rest of your belongings in the car and bring them to your room later.  A Pre-op nurse will start an IV and you may receive medication in preparation for surgery, including  something to help you relax.  Your family will be able to see you in the Pre-op area.  Two visitors at a time will be allowed in the Pre-op room.  While you are in surgery your family should notify the waiting room  if they leave the waiting room area and provide a contact phone number.    Please be aware that surgery does come with discomfort.  We want to make every effort to control your discomfort so please discuss any uncontrolled symptoms with your nurse.   Your doctor will most likely have prescribed pain medications.      If you are going home after surgery you will receive individualized written care instructions before being discharged.  A responsible adult must drive you to and from the hospital on the day of your surgery and stay with you for 24 hours.    If you are staying overnight following surgery, you will be transported to your hospital room following the recovery period.  [facility] has all private rooms.    If you have any questions please call Pre-Admission Testing at [phone].  Deductibles and co-payments are collected on the day of service. Please be prepared to pay the required co-pay, deductible or deposit on the day of service as defined by your plan.    Patient Education for Self-Quarantine Process    Following your COVID testing, we strongly recommend that you do not leave your home after you have been tested for COVID except to get medical care. This includes not going to work, school or to public areas.  If this is not possible for you to do please limit your activities to only required outings.  Be sure to wear a mask when you are with other people, practice social distancing and wash your hands frequently.      The following items provide additional details to keep you safe.  • Wash your hands with soap and water frequently for at least 20 seconds.   • Avoid touching your eyes, nose and mouth with unwashed hands.  • Do not share anything - utensils,  towels, food from the same bowl.   • Have your own utensils, drinking glass, dishes, towels and bedding.   • Do not have visitors.   • Do use FaceTime to stay in touch with family and friends.  • You should stay in a specific room away from others if possible.   • Stay at least 6 feet away from others in the home if you cannot have a dedicated room to yourself.   • Do not snuggle with your pet. While the CDC says there is no evidence that pets can spread COVID-19 or be infected from humans, it is probably best to avoid “petting, snuggling, being kissed or licked and sharing food (during self-quarantine)”, according to the CDC.   • Sanitize household surfaces daily. Include all high touch areas (door handles, light switches, phones, countertops, etc.)  • Do not share a bathroom with others, if possible.   • Wear a mask around others in your home if you are unable to stay in a separate room or 6 feet apart. If  you are unable to wear a mask, have your family member wear a mask if they must be within 6 feet of you.   Call your surgeon immediately if you experience any of the following symptoms:  • Sore Throat  • Shortness of Breath or difficulty breathing  • Cough  • Chills  • Body soreness or muscle pain  • Headache  • Fever  • New loss of taste or smell  • Do not arrive for your surgery ill.  Your procedure will need to be rescheduled to another time.  You will need to call your physician before the day of surgery to avoid any unnecessary exposure to hospital staff as well as other patients.    CHLORHEXIDINE CLOTH INSTRUCTIONS  The morning of surgery follow these instructions using the Chlorhexidine cloths you've been given.  These steps reduce bacteria on the body.  Do not use the cloths near your eyes, ears mouth, genitalia or on open wounds.  Throw the cloths away after use but do not try to flush them down a toilet.      • Open and remove one cloth at a time from the package.    • Leave the cloth unfolded and  begin the bathing.  • Massage the skin with the cloths using gentle pressure to remove bacteria.  Do not scrub harshly.   • Follow the steps below with one 2% CHG cloth per area (6 total cloths).  • One cloth for neck, shoulders and chest.  • One cloth for both arms, hands, fingers and underarms (do underarms last).  • One cloth for the abdomen followed by groin.  • One cloth for right leg and foot including between the toes.  • One cloth for left leg and foot including between the toes.  • The last cloth is to be used for the back of the neck, back and buttocks.    Allow the CHG to air dry 3 minutes on the skin which will give it time to work and decrease the chance of irritation.  The skin may feel sticky until it is dry.  Do not rinse with water or any other liquid or you will lose the beneficial effects of the CHG.  If mild skin irritation occurs, do rinse the skin to remove the CHG.  Report this to the nurse at time of admission.  Do not apply lotions, creams, ointments, deodorants or perfumes after using the clothes. Dress in clean clothes before coming to the hospital.    BACTROBAN NASAL OINTMENT  There are many germs normally in your nose. Bactroban is an ointment that will help reduce these germs. Please follow these instructions for Bactroban use:      ____The day before surgery in the morning  Date________    ____The day before surgery in the evening              Date________    ____The day of surgery in the morning    Date________    **Squirt ½ package of Bactroban Ointment onto a cotton applicator and apply to inside of 1st nostril.  Squirt the remaining Bactroban and apply to the inside of the other nostril.

## 2020-07-22 NOTE — PAT
Pt here with daughter for PAT appt.  Scheduled for RTKA with Dr Michel on 7/29.  This is patients first joint placement.  Several previous surgeries w/o problems with anesthesia.    PMH includes OA, dementia, cataracts, depression.  Neg hx of cardiac or pulm problems.  Denies any recent acute illnesses or symptoms.      Exam reveals pleasant elderly female in NAD.  Resp easy.  Neuro grossly intact but does exhibit some cognition issues with memory.  Chest CTA.  S1S2 RRR.    All testing reviewed and appropriate to proceed w/ planned surgery.

## 2020-07-27 ENCOUNTER — LAB (OUTPATIENT)
Dept: LAB | Facility: HOSPITAL | Age: 75
End: 2020-07-27

## 2020-07-27 DIAGNOSIS — Z01.818 OTHER SPECIFIED PRE-OPERATIVE EXAMINATION: ICD-10-CM

## 2020-07-27 PROCEDURE — C9803 HOPD COVID-19 SPEC COLLECT: HCPCS

## 2020-07-27 PROCEDURE — U0004 COV-19 TEST NON-CDC HGH THRU: HCPCS

## 2020-07-28 LAB
REF LAB TEST METHOD: NORMAL
SARS-COV-2 RNA RESP QL NAA+PROBE: NOT DETECTED

## 2020-07-29 ENCOUNTER — HOSPITAL ENCOUNTER (INPATIENT)
Facility: HOSPITAL | Age: 75
LOS: 1 days | Discharge: HOME HEALTH | End: 2020-07-30
Attending: ORTHOPAEDIC SURGERY | Admitting: ORTHOPAEDIC SURGERY

## 2020-07-29 ENCOUNTER — APPOINTMENT (OUTPATIENT)
Dept: GENERAL RADIOLOGY | Facility: HOSPITAL | Age: 75
End: 2020-07-29

## 2020-07-29 ENCOUNTER — ANESTHESIA EVENT (OUTPATIENT)
Dept: PERIOP | Facility: HOSPITAL | Age: 75
End: 2020-07-29

## 2020-07-29 ENCOUNTER — ANESTHESIA (OUTPATIENT)
Dept: PERIOP | Facility: HOSPITAL | Age: 75
End: 2020-07-29

## 2020-07-29 DIAGNOSIS — Z96.651 TOTAL KNEE REPLACEMENT STATUS, RIGHT: Primary | ICD-10-CM

## 2020-07-29 LAB
HCT VFR BLD AUTO: 38.2 % (ref 34–46.6)
HGB BLD-MCNC: 12.7 G/DL (ref 12–15.9)

## 2020-07-29 PROCEDURE — 85018 HEMOGLOBIN: CPT | Performed by: ORTHOPAEDIC SURGERY

## 2020-07-29 PROCEDURE — 0SRC0J9 REPLACEMENT OF RIGHT KNEE JOINT WITH SYNTHETIC SUBSTITUTE, CEMENTED, OPEN APPROACH: ICD-10-PCS | Performed by: ORTHOPAEDIC SURGERY

## 2020-07-29 PROCEDURE — 97110 THERAPEUTIC EXERCISES: CPT

## 2020-07-29 PROCEDURE — C1713 ANCHOR/SCREW BN/BN,TIS/BN: HCPCS | Performed by: ORTHOPAEDIC SURGERY

## 2020-07-29 PROCEDURE — 25010000003 CEFAZOLIN IN DEXTROSE 2-4 GM/100ML-% SOLUTION: Performed by: ORTHOPAEDIC SURGERY

## 2020-07-29 PROCEDURE — 25010000002 CLONIDINE PER 1 MG: Performed by: ORTHOPAEDIC SURGERY

## 2020-07-29 PROCEDURE — 25010000002 ROPIVACAINE PER 1 MG: Performed by: ORTHOPAEDIC SURGERY

## 2020-07-29 PROCEDURE — C1776 JOINT DEVICE (IMPLANTABLE): HCPCS | Performed by: ORTHOPAEDIC SURGERY

## 2020-07-29 PROCEDURE — 97162 PT EVAL MOD COMPLEX 30 MIN: CPT

## 2020-07-29 PROCEDURE — 25010000002 ONDANSETRON PER 1 MG: Performed by: NURSE ANESTHETIST, CERTIFIED REGISTERED

## 2020-07-29 PROCEDURE — 25010000002 KETOROLAC TROMETHAMINE PER 15 MG: Performed by: ORTHOPAEDIC SURGERY

## 2020-07-29 PROCEDURE — 85014 HEMATOCRIT: CPT | Performed by: ORTHOPAEDIC SURGERY

## 2020-07-29 PROCEDURE — 25010000002 PROPOFOL 10 MG/ML EMULSION: Performed by: NURSE ANESTHETIST, CERTIFIED REGISTERED

## 2020-07-29 PROCEDURE — 25010000002 EPINEPHRINE 1 MG/ML SOLUTION 30 ML VIAL: Performed by: ORTHOPAEDIC SURGERY

## 2020-07-29 PROCEDURE — 73560 X-RAY EXAM OF KNEE 1 OR 2: CPT

## 2020-07-29 DEVICE — TOTL KN: Type: IMPLANTABLE DEVICE | Site: KNEE | Status: FUNCTIONAL

## 2020-07-29 DEVICE — DEV CONTRL TISS STRATAFIX SYMM PDS PLUS VIL CT-1 60CM: Type: IMPLANTABLE DEVICE | Site: KNEE | Status: FUNCTIONAL

## 2020-07-29 DEVICE — FEMUR SPHERE CEMENTED RIGHT, SIZE 2
Type: IMPLANTABLE DEVICE | Site: KNEE | Status: FUNCTIONAL
Brand: GMK SPHERE TOTAL KNEE SYSTEM

## 2020-07-29 DEVICE — DEV CONTRL TISS STRATAFIX SPIRAL MNCRYL UD 3/0 PLS 30CM: Type: IMPLANTABLE DEVICE | Site: KNEE | Status: FUNCTIONAL

## 2020-07-29 DEVICE — CMT BONE PALACOS R HI/VISC 1X40: Type: IMPLANTABLE DEVICE | Site: KNEE | Status: FUNCTIONAL

## 2020-07-29 DEVICE — FIXED TIBIAL TRAY CEMENTED RIGHT, SIZE 2
Type: IMPLANTABLE DEVICE | Site: KNEE | Status: FUNCTIONAL
Brand: GMK PRIMARY TOTAL KNEE SYSTEM

## 2020-07-29 DEVICE — TIBIAL INSERT FIXED SPHERE FLEX  #2/11 MM R
Type: IMPLANTABLE DEVICE | Site: KNEE | Status: FUNCTIONAL
Brand: GMK SPHERE TOTAL KNEE SYSTEM

## 2020-07-29 RX ORDER — ONDANSETRON 4 MG/1
4 TABLET, FILM COATED ORAL EVERY 6 HOURS PRN
Status: DISCONTINUED | OUTPATIENT
Start: 2020-07-29 | End: 2020-07-30 | Stop reason: HOSPADM

## 2020-07-29 RX ORDER — MAGNESIUM HYDROXIDE 1200 MG/15ML
LIQUID ORAL AS NEEDED
Status: DISCONTINUED | OUTPATIENT
Start: 2020-07-29 | End: 2020-07-29 | Stop reason: HOSPADM

## 2020-07-29 RX ORDER — PROPOFOL 10 MG/ML
VIAL (ML) INTRAVENOUS AS NEEDED
Status: DISCONTINUED | OUTPATIENT
Start: 2020-07-29 | End: 2020-07-29 | Stop reason: SURG

## 2020-07-29 RX ORDER — NALOXONE HCL 0.4 MG/ML
0.2 VIAL (ML) INJECTION AS NEEDED
Status: DISCONTINUED | OUTPATIENT
Start: 2020-07-29 | End: 2020-07-29 | Stop reason: HOSPADM

## 2020-07-29 RX ORDER — LIDOCAINE HYDROCHLORIDE 10 MG/ML
0.5 INJECTION, SOLUTION EPIDURAL; INFILTRATION; INTRACAUDAL; PERINEURAL ONCE AS NEEDED
Status: DISCONTINUED | OUTPATIENT
Start: 2020-07-29 | End: 2020-07-29 | Stop reason: HOSPADM

## 2020-07-29 RX ORDER — KETOROLAC TROMETHAMINE 30 MG/ML
15 INJECTION, SOLUTION INTRAMUSCULAR; INTRAVENOUS EVERY 6 HOURS PRN
Status: DISCONTINUED | OUTPATIENT
Start: 2020-07-29 | End: 2020-07-30 | Stop reason: HOSPADM

## 2020-07-29 RX ORDER — CEFAZOLIN SODIUM 2 G/100ML
2 INJECTION, SOLUTION INTRAVENOUS ONCE
Status: COMPLETED | OUTPATIENT
Start: 2020-07-29 | End: 2020-07-29

## 2020-07-29 RX ORDER — DIPHENHYDRAMINE HCL 25 MG
25 CAPSULE ORAL
Status: DISCONTINUED | OUTPATIENT
Start: 2020-07-29 | End: 2020-07-29 | Stop reason: HOSPADM

## 2020-07-29 RX ORDER — LIDOCAINE HYDROCHLORIDE 20 MG/ML
INJECTION, SOLUTION INFILTRATION; PERINEURAL AS NEEDED
Status: DISCONTINUED | OUTPATIENT
Start: 2020-07-29 | End: 2020-07-29 | Stop reason: SURG

## 2020-07-29 RX ORDER — ALBUTEROL SULFATE 2.5 MG/3ML
2.5 SOLUTION RESPIRATORY (INHALATION) ONCE AS NEEDED
Status: DISCONTINUED | OUTPATIENT
Start: 2020-07-29 | End: 2020-07-29 | Stop reason: HOSPADM

## 2020-07-29 RX ORDER — HYDROMORPHONE HYDROCHLORIDE 1 MG/ML
0.25 INJECTION, SOLUTION INTRAMUSCULAR; INTRAVENOUS; SUBCUTANEOUS
Status: DISCONTINUED | OUTPATIENT
Start: 2020-07-29 | End: 2020-07-29 | Stop reason: HOSPADM

## 2020-07-29 RX ORDER — SODIUM CHLORIDE 0.9 % (FLUSH) 0.9 %
3 SYRINGE (ML) INJECTION EVERY 12 HOURS SCHEDULED
Status: DISCONTINUED | OUTPATIENT
Start: 2020-07-29 | End: 2020-07-30 | Stop reason: HOSPADM

## 2020-07-29 RX ORDER — PROMETHAZINE HYDROCHLORIDE 25 MG/1
25 SUPPOSITORY RECTAL ONCE AS NEEDED
Status: DISCONTINUED | OUTPATIENT
Start: 2020-07-29 | End: 2020-07-29 | Stop reason: HOSPADM

## 2020-07-29 RX ORDER — FENTANYL CITRATE 50 UG/ML
25 INJECTION, SOLUTION INTRAMUSCULAR; INTRAVENOUS
Status: DISCONTINUED | OUTPATIENT
Start: 2020-07-29 | End: 2020-07-29 | Stop reason: HOSPADM

## 2020-07-29 RX ORDER — ONDANSETRON 2 MG/ML
4 INJECTION INTRAMUSCULAR; INTRAVENOUS EVERY 6 HOURS PRN
Status: DISCONTINUED | OUTPATIENT
Start: 2020-07-29 | End: 2020-07-30 | Stop reason: HOSPADM

## 2020-07-29 RX ORDER — OXYCODONE AND ACETAMINOPHEN 7.5; 325 MG/1; MG/1
1 TABLET ORAL ONCE AS NEEDED
Status: DISCONTINUED | OUTPATIENT
Start: 2020-07-29 | End: 2020-07-29 | Stop reason: HOSPADM

## 2020-07-29 RX ORDER — PROMETHAZINE HYDROCHLORIDE 25 MG/ML
12.5 INJECTION, SOLUTION INTRAMUSCULAR; INTRAVENOUS ONCE AS NEEDED
Status: DISCONTINUED | OUTPATIENT
Start: 2020-07-29 | End: 2020-07-29 | Stop reason: HOSPADM

## 2020-07-29 RX ORDER — EPHEDRINE SULFATE 50 MG/ML
INJECTION, SOLUTION INTRAVENOUS AS NEEDED
Status: DISCONTINUED | OUTPATIENT
Start: 2020-07-29 | End: 2020-07-29 | Stop reason: SURG

## 2020-07-29 RX ORDER — ACETAMINOPHEN 325 MG/1
325 TABLET ORAL EVERY 4 HOURS PRN
Status: DISCONTINUED | OUTPATIENT
Start: 2020-07-29 | End: 2020-07-30 | Stop reason: HOSPADM

## 2020-07-29 RX ORDER — ACETAMINOPHEN 325 MG/1
650 TABLET ORAL ONCE AS NEEDED
Status: DISCONTINUED | OUTPATIENT
Start: 2020-07-29 | End: 2020-07-29 | Stop reason: HOSPADM

## 2020-07-29 RX ORDER — SODIUM CHLORIDE 0.9 % (FLUSH) 0.9 %
3-10 SYRINGE (ML) INJECTION AS NEEDED
Status: DISCONTINUED | OUTPATIENT
Start: 2020-07-29 | End: 2020-07-29 | Stop reason: HOSPADM

## 2020-07-29 RX ORDER — SODIUM CHLORIDE 0.9 % (FLUSH) 0.9 %
3-10 SYRINGE (ML) INJECTION AS NEEDED
Status: DISCONTINUED | OUTPATIENT
Start: 2020-07-29 | End: 2020-07-30 | Stop reason: HOSPADM

## 2020-07-29 RX ORDER — EPHEDRINE SULFATE 50 MG/ML
5 INJECTION, SOLUTION INTRAVENOUS ONCE AS NEEDED
Status: DISCONTINUED | OUTPATIENT
Start: 2020-07-29 | End: 2020-07-29 | Stop reason: HOSPADM

## 2020-07-29 RX ORDER — HYDROMORPHONE HYDROCHLORIDE 1 MG/ML
0.5 INJECTION, SOLUTION INTRAMUSCULAR; INTRAVENOUS; SUBCUTANEOUS
Status: DISCONTINUED | OUTPATIENT
Start: 2020-07-29 | End: 2020-07-30 | Stop reason: HOSPADM

## 2020-07-29 RX ORDER — MEMANTINE HYDROCHLORIDE 10 MG/1
10 TABLET ORAL DAILY
Status: DISCONTINUED | OUTPATIENT
Start: 2020-07-29 | End: 2020-07-30 | Stop reason: HOSPADM

## 2020-07-29 RX ORDER — PHENAZOPYRIDINE HYDROCHLORIDE 100 MG/1
100 TABLET, FILM COATED ORAL AS NEEDED
Status: DISCONTINUED | OUTPATIENT
Start: 2020-07-29 | End: 2020-07-30 | Stop reason: HOSPADM

## 2020-07-29 RX ORDER — BUPIVACAINE HYDROCHLORIDE 7.5 MG/ML
INJECTION, SOLUTION EPIDURAL; RETROBULBAR
Status: COMPLETED | OUTPATIENT
Start: 2020-07-29 | End: 2020-07-29

## 2020-07-29 RX ORDER — FLUMAZENIL 0.1 MG/ML
0.2 INJECTION INTRAVENOUS AS NEEDED
Status: DISCONTINUED | OUTPATIENT
Start: 2020-07-29 | End: 2020-07-29 | Stop reason: HOSPADM

## 2020-07-29 RX ORDER — SODIUM CHLORIDE 0.9 % (FLUSH) 0.9 %
3 SYRINGE (ML) INJECTION EVERY 12 HOURS SCHEDULED
Status: DISCONTINUED | OUTPATIENT
Start: 2020-07-29 | End: 2020-07-29 | Stop reason: HOSPADM

## 2020-07-29 RX ORDER — PROPOFOL 10 MG/ML
VIAL (ML) INTRAVENOUS AS NEEDED
Status: DISCONTINUED | OUTPATIENT
Start: 2020-07-29 | End: 2020-07-29

## 2020-07-29 RX ORDER — HYDROCODONE BITARTRATE AND ACETAMINOPHEN 7.5; 325 MG/1; MG/1
2 TABLET ORAL EVERY 4 HOURS PRN
Status: DISCONTINUED | OUTPATIENT
Start: 2020-07-29 | End: 2020-07-30 | Stop reason: HOSPADM

## 2020-07-29 RX ORDER — DIPHENHYDRAMINE HYDROCHLORIDE 50 MG/ML
12.5 INJECTION INTRAMUSCULAR; INTRAVENOUS
Status: DISCONTINUED | OUTPATIENT
Start: 2020-07-29 | End: 2020-07-29 | Stop reason: HOSPADM

## 2020-07-29 RX ORDER — PROPOFOL 10 MG/ML
VIAL (ML) INTRAVENOUS CONTINUOUS PRN
Status: DISCONTINUED | OUTPATIENT
Start: 2020-07-29 | End: 2020-07-29 | Stop reason: SURG

## 2020-07-29 RX ORDER — FAMOTIDINE 10 MG/ML
20 INJECTION, SOLUTION INTRAVENOUS ONCE
Status: COMPLETED | OUTPATIENT
Start: 2020-07-29 | End: 2020-07-29

## 2020-07-29 RX ORDER — ONDANSETRON 2 MG/ML
4 INJECTION INTRAMUSCULAR; INTRAVENOUS ONCE AS NEEDED
Status: DISCONTINUED | OUTPATIENT
Start: 2020-07-29 | End: 2020-07-29 | Stop reason: HOSPADM

## 2020-07-29 RX ORDER — HYDROCODONE BITARTRATE AND ACETAMINOPHEN 7.5; 325 MG/1; MG/1
1 TABLET ORAL EVERY 4 HOURS PRN
Status: DISCONTINUED | OUTPATIENT
Start: 2020-07-29 | End: 2020-07-30 | Stop reason: HOSPADM

## 2020-07-29 RX ORDER — SODIUM CHLORIDE, SODIUM LACTATE, POTASSIUM CHLORIDE, CALCIUM CHLORIDE 600; 310; 30; 20 MG/100ML; MG/100ML; MG/100ML; MG/100ML
100 INJECTION, SOLUTION INTRAVENOUS CONTINUOUS
Status: DISCONTINUED | OUTPATIENT
Start: 2020-07-29 | End: 2020-07-30 | Stop reason: HOSPADM

## 2020-07-29 RX ORDER — CEFAZOLIN SODIUM 2 G/100ML
2 INJECTION, SOLUTION INTRAVENOUS EVERY 8 HOURS
Status: COMPLETED | OUTPATIENT
Start: 2020-07-29 | End: 2020-07-30

## 2020-07-29 RX ORDER — TRAMADOL HYDROCHLORIDE 50 MG/1
50 TABLET ORAL EVERY 8 HOURS PRN
Status: DISCONTINUED | OUTPATIENT
Start: 2020-07-29 | End: 2020-07-30 | Stop reason: HOSPADM

## 2020-07-29 RX ORDER — ASPIRIN 81 MG/1
81 TABLET ORAL 2 TIMES DAILY
Status: DISCONTINUED | OUTPATIENT
Start: 2020-07-29 | End: 2020-07-30 | Stop reason: HOSPADM

## 2020-07-29 RX ORDER — TRANEXAMIC ACID 100 MG/ML
INJECTION, SOLUTION INTRAVENOUS AS NEEDED
Status: DISCONTINUED | OUTPATIENT
Start: 2020-07-29 | End: 2020-07-29 | Stop reason: SURG

## 2020-07-29 RX ORDER — MIDAZOLAM HYDROCHLORIDE 1 MG/ML
1 INJECTION INTRAMUSCULAR; INTRAVENOUS
Status: DISCONTINUED | OUTPATIENT
Start: 2020-07-29 | End: 2020-07-29 | Stop reason: HOSPADM

## 2020-07-29 RX ORDER — HYDRALAZINE HYDROCHLORIDE 20 MG/ML
5 INJECTION INTRAMUSCULAR; INTRAVENOUS
Status: DISCONTINUED | OUTPATIENT
Start: 2020-07-29 | End: 2020-07-29 | Stop reason: HOSPADM

## 2020-07-29 RX ORDER — PROMETHAZINE HYDROCHLORIDE 25 MG/1
25 TABLET ORAL ONCE AS NEEDED
Status: DISCONTINUED | OUTPATIENT
Start: 2020-07-29 | End: 2020-07-29 | Stop reason: HOSPADM

## 2020-07-29 RX ORDER — PROMETHAZINE HYDROCHLORIDE 25 MG/ML
6.25 INJECTION, SOLUTION INTRAMUSCULAR; INTRAVENOUS
Status: DISCONTINUED | OUTPATIENT
Start: 2020-07-29 | End: 2020-07-29 | Stop reason: HOSPADM

## 2020-07-29 RX ORDER — HYDROCODONE BITARTRATE AND ACETAMINOPHEN 5; 325 MG/1; MG/1
1 TABLET ORAL ONCE AS NEEDED
Status: DISCONTINUED | OUTPATIENT
Start: 2020-07-29 | End: 2020-07-29 | Stop reason: HOSPADM

## 2020-07-29 RX ORDER — CITALOPRAM 20 MG/1
20 TABLET ORAL DAILY
Status: DISCONTINUED | OUTPATIENT
Start: 2020-07-29 | End: 2020-07-30 | Stop reason: HOSPADM

## 2020-07-29 RX ORDER — NALOXONE HCL 0.4 MG/ML
0.1 VIAL (ML) INJECTION
Status: DISCONTINUED | OUTPATIENT
Start: 2020-07-29 | End: 2020-07-30 | Stop reason: HOSPADM

## 2020-07-29 RX ORDER — SODIUM CHLORIDE, SODIUM LACTATE, POTASSIUM CHLORIDE, CALCIUM CHLORIDE 600; 310; 30; 20 MG/100ML; MG/100ML; MG/100ML; MG/100ML
9 INJECTION, SOLUTION INTRAVENOUS CONTINUOUS
Status: DISCONTINUED | OUTPATIENT
Start: 2020-07-29 | End: 2020-07-30 | Stop reason: HOSPADM

## 2020-07-29 RX ORDER — FENTANYL CITRATE 50 UG/ML
50 INJECTION, SOLUTION INTRAMUSCULAR; INTRAVENOUS
Status: DISCONTINUED | OUTPATIENT
Start: 2020-07-29 | End: 2020-07-29 | Stop reason: HOSPADM

## 2020-07-29 RX ORDER — ONDANSETRON 2 MG/ML
INJECTION INTRAMUSCULAR; INTRAVENOUS AS NEEDED
Status: DISCONTINUED | OUTPATIENT
Start: 2020-07-29 | End: 2020-07-29 | Stop reason: SURG

## 2020-07-29 RX ADMIN — PROPOFOL 50 MG: 10 INJECTION, EMULSION INTRAVENOUS at 12:35

## 2020-07-29 RX ADMIN — LIDOCAINE HYDROCHLORIDE 60 MG: 20 INJECTION, SOLUTION INFILTRATION; PERINEURAL at 12:34

## 2020-07-29 RX ADMIN — ASPIRIN 81 MG: 81 TABLET, COATED ORAL at 17:41

## 2020-07-29 RX ADMIN — ONDANSETRON HYDROCHLORIDE 4 MG: 2 SOLUTION INTRAMUSCULAR; INTRAVENOUS at 13:53

## 2020-07-29 RX ADMIN — BUPIVACAINE HYDROCHLORIDE 1.6 ML: 7.5 INJECTION, SOLUTION EPIDURAL; RETROBULBAR at 12:28

## 2020-07-29 RX ADMIN — TRANEXAMIC ACID 1000 MG: 100 INJECTION, SOLUTION INTRAVENOUS at 12:36

## 2020-07-29 RX ADMIN — MEMANTINE HYDROCHLORIDE 10 MG: 10 TABLET, FILM COATED ORAL at 17:41

## 2020-07-29 RX ADMIN — KETOROLAC TROMETHAMINE 15 MG: 30 INJECTION, SOLUTION INTRAMUSCULAR at 14:30

## 2020-07-29 RX ADMIN — SODIUM CHLORIDE, POTASSIUM CHLORIDE, SODIUM LACTATE AND CALCIUM CHLORIDE 100 ML/HR: 600; 310; 30; 20 INJECTION, SOLUTION INTRAVENOUS at 17:41

## 2020-07-29 RX ADMIN — SODIUM CHLORIDE, POTASSIUM CHLORIDE, SODIUM LACTATE AND CALCIUM CHLORIDE 9 ML/HR: 600; 310; 30; 20 INJECTION, SOLUTION INTRAVENOUS at 10:23

## 2020-07-29 RX ADMIN — TRANEXAMIC ACID 1000 MG: 100 INJECTION, SOLUTION INTRAVENOUS at 13:38

## 2020-07-29 RX ADMIN — SODIUM CHLORIDE, PRESERVATIVE FREE 3 ML: 5 INJECTION INTRAVENOUS at 21:05

## 2020-07-29 RX ADMIN — CEFAZOLIN SODIUM 2 G: 2 INJECTION, SOLUTION INTRAVENOUS at 21:05

## 2020-07-29 RX ADMIN — FAMOTIDINE 20 MG: 10 INJECTION INTRAVENOUS at 10:23

## 2020-07-29 RX ADMIN — EPHEDRINE SULFATE 10 MG: 50 INJECTION INTRAVENOUS at 13:13

## 2020-07-29 RX ADMIN — PROPOFOL 75 MCG/KG/MIN: 10 INJECTION, EMULSION INTRAVENOUS at 12:36

## 2020-07-29 RX ADMIN — CEFAZOLIN SODIUM 2 G: 2 INJECTION, SOLUTION INTRAVENOUS at 12:21

## 2020-07-29 RX ADMIN — CITALOPRAM 20 MG: 20 TABLET, FILM COATED ORAL at 17:41

## 2020-07-29 NOTE — ANESTHESIA PREPROCEDURE EVALUATION
Anesthesia Evaluation     Patient summary reviewed   no history of anesthetic complications:  NPO Solid Status: > 8 hours  NPO Liquid Status: > 2 hours           Airway   Mallampati: II  TM distance: >3 FB  Neck ROM: full  Dental      Pulmonary     breath sounds clear to auscultation  (-) shortness of breath, not a smoker  Cardiovascular   Exercise tolerance: good (4-7 METS)    Rhythm: regular  Rate: normal    (-) angina, [place]      Neuro/Psych  (+) psychiatric history (dementia) Depression,     GI/Hepatic/Renal/Endo    (+)   liver disease,     Musculoskeletal     Abdominal    Substance History      OB/GYN          Other   arthritis,                    Anesthesia Plan    ASA 3     spinal       Anesthetic plan, all risks, benefits, and alternatives have been provided, discussed and informed consent has been obtained with: patient (daughter).

## 2020-07-29 NOTE — ANESTHESIA POSTPROCEDURE EVALUATION
"Patient: Salud Gama    Procedure Summary     Date:  07/29/20 Room / Location:  [facility] OR [address] [place] [address] MAIN OR    Anesthesia Start:  1220 Anesthesia Stop:  1406    Procedure:  RIGHT TOTAL KNEE ARTHROPLASTY (Right Knee) Diagnosis:      Surgeon:  Panda Michel MD Provider:  Bert Gonsalves MD    Anesthesia Type:  spinal ASA Status:  3          Anesthesia Type: spinal    Vitals  Vitals Value Taken Time   /63 7/29/2020  2:45 PM   Temp 36.5 °C (97.7 °F) 7/29/2020  2:04 PM   Pulse 61 7/29/2020  2:50 PM   Resp 16 7/29/2020  2:45 PM   SpO2 100 % 7/29/2020  2:50 PM   Vitals shown include unvalidated device data.        Post Anesthesia Care and Evaluation    Patient location during evaluation: bedside  Patient participation: complete - patient participated  Level of consciousness: awake  Pain score: 2  Pain management: adequate  Airway patency: patent  Anesthetic complications: No anesthetic complications  PONV Status: none  Cardiovascular status: acceptable  Respiratory status: acceptable  Hydration status: acceptable    Comments: /63   Pulse 57   Temp 36.5 °C (97.7 °F)   Resp 16   Ht 157.5 cm (62\")   Wt 62.8 kg (138 lb 8 oz)   LMP 01/01/1976   SpO2 99%   BMI 25.33 kg/m²         "

## 2020-07-29 NOTE — ANESTHESIA PROCEDURE NOTES
Spinal Block      Patient reassessed immediately prior to procedure    Patient location during procedure: OR  Start Time: 7/29/2020 12:20 PM  Stop Time: 7/29/2020 12:27 PM  Indication:at surgeon's request  Performed By  Anesthesiologist: Bert Gonsalves MD  Preanesthetic Checklist  Completed: patient identified, site marked, surgical consent, pre-op evaluation, timeout performed, IV checked, risks and benefits discussed and monitors and equipment checked  Spinal Block Prep:  Patient Position:sitting  Sterile Tech:cap, gloves, mask and sterile barriers  Prep:Betadine  Patient Monitoring:blood pressure monitoring, continuous pulse oximetry and EKG  Spinal Block Procedure  Approach:midline  Guidance:landmark technique and palpation technique  Location:L5-S1  Needle Type:Valdemar  Needle Gauge:25  Placement of Spinal needle event:cerebrospinal fluid aspirated  Paresthesia: no  Fluid Appearance:clear  Medications: bupivacaine PF (MARCAINE) 0.75 % injection, 1.6 mL   Post Assessment  Patient Tolerance:patient tolerated the procedure well with no apparent complications  Complications no

## 2020-07-30 ENCOUNTER — READMISSION MANAGEMENT (OUTPATIENT)
Dept: CALL CENTER | Facility: HOSPITAL | Age: 75
End: 2020-07-30

## 2020-07-30 VITALS
RESPIRATION RATE: 18 BRPM | HEIGHT: 62 IN | OXYGEN SATURATION: 95 % | DIASTOLIC BLOOD PRESSURE: 50 MMHG | TEMPERATURE: 98 F | SYSTOLIC BLOOD PRESSURE: 94 MMHG | BODY MASS INDEX: 25.49 KG/M2 | WEIGHT: 138.5 LBS | HEART RATE: 56 BPM

## 2020-07-30 LAB
ANION GAP SERPL CALCULATED.3IONS-SCNC: 9.2 MMOL/L (ref 5–15)
BUN SERPL-MCNC: 19 MG/DL (ref 8–23)
BUN/CREAT SERPL: 35.2 (ref 7–25)
CALCIUM SPEC-SCNC: 8.6 MG/DL (ref 8.6–10.5)
CHLORIDE SERPL-SCNC: 102 MMOL/L (ref 98–107)
CO2 SERPL-SCNC: 24.8 MMOL/L (ref 22–29)
CREAT SERPL-MCNC: 0.54 MG/DL (ref 0.57–1)
GFR SERPL CREATININE-BSD FRML MDRD: 110 ML/MIN/1.73
GLUCOSE SERPL-MCNC: 119 MG/DL (ref 65–99)
HCT VFR BLD AUTO: 32.4 % (ref 34–46.6)
HGB BLD-MCNC: 10.6 G/DL (ref 12–15.9)
POTASSIUM SERPL-SCNC: 4 MMOL/L (ref 3.5–5.2)
SODIUM SERPL-SCNC: 136 MMOL/L (ref 136–145)

## 2020-07-30 PROCEDURE — 85018 HEMOGLOBIN: CPT | Performed by: ORTHOPAEDIC SURGERY

## 2020-07-30 PROCEDURE — 25010000003 CEFAZOLIN IN DEXTROSE 2-4 GM/100ML-% SOLUTION: Performed by: ORTHOPAEDIC SURGERY

## 2020-07-30 PROCEDURE — 97110 THERAPEUTIC EXERCISES: CPT

## 2020-07-30 PROCEDURE — 80048 BASIC METABOLIC PNL TOTAL CA: CPT | Performed by: ORTHOPAEDIC SURGERY

## 2020-07-30 PROCEDURE — 85014 HEMATOCRIT: CPT | Performed by: ORTHOPAEDIC SURGERY

## 2020-07-30 RX ORDER — TRAMADOL HYDROCHLORIDE 50 MG/1
50 TABLET ORAL EVERY 8 HOURS PRN
Qty: 45 TABLET | Refills: 0 | Status: SHIPPED | OUTPATIENT
Start: 2020-07-30 | End: 2020-08-14

## 2020-07-30 RX ORDER — ASPIRIN 81 MG/1
81 TABLET ORAL 2 TIMES DAILY
Qty: 60 TABLET | Refills: 0 | Status: SHIPPED | OUTPATIENT
Start: 2020-07-30 | End: 2020-08-29

## 2020-07-30 RX ORDER — HYDROCODONE BITARTRATE AND ACETAMINOPHEN 7.5; 325 MG/1; MG/1
1 TABLET ORAL EVERY 6 HOURS PRN
Qty: 56 TABLET | Refills: 0 | Status: SHIPPED | OUTPATIENT
Start: 2020-07-30 | End: 2020-08-13

## 2020-07-30 RX ADMIN — HYDROCODONE BITARTRATE AND ACETAMINOPHEN 1 TABLET: 7.5; 325 TABLET ORAL at 00:06

## 2020-07-30 RX ADMIN — HYDROCODONE BITARTRATE AND ACETAMINOPHEN 1 TABLET: 7.5; 325 TABLET ORAL at 11:25

## 2020-07-30 RX ADMIN — SODIUM CHLORIDE, PRESERVATIVE FREE 3 ML: 5 INJECTION INTRAVENOUS at 08:07

## 2020-07-30 RX ADMIN — MEMANTINE HYDROCHLORIDE 10 MG: 10 TABLET, FILM COATED ORAL at 08:06

## 2020-07-30 RX ADMIN — HYDROCODONE BITARTRATE AND ACETAMINOPHEN 2 TABLET: 7.5; 325 TABLET ORAL at 04:39

## 2020-07-30 RX ADMIN — CITALOPRAM 20 MG: 20 TABLET, FILM COATED ORAL at 08:06

## 2020-07-30 RX ADMIN — ASPIRIN 81 MG: 81 TABLET, COATED ORAL at 08:06

## 2020-07-30 RX ADMIN — CEFAZOLIN SODIUM 2 G: 2 INJECTION, SOLUTION INTRAVENOUS at 04:39

## 2020-07-30 NOTE — OUTREACH NOTE
Prep Survey      Responses   [facility] facility patient discharged from?  [facility]   Is LACE score < 7 ?  No   Eligibility  [facility]   Date of Admission  07/29/20   Date of Discharge  07/30/20   Discharge Disposition  Home or Self Care   Discharge diagnosis  Right total knee arthro   COVID-19 Test Status  Negative   Does the patient have one of the following disease processes/diagnoses(primary or secondary)?  Total Joint Replacement   Does the patient have Home health ordered?  Yes   What is the Home health agency?   [facility]   Is there a DME ordered?  No   Prep survey completed?  Yes          Randee Steiner RN

## 2020-07-31 ENCOUNTER — TRANSITIONAL CARE MANAGEMENT TELEPHONE ENCOUNTER (OUTPATIENT)
Dept: CALL CENTER | Facility: HOSPITAL | Age: 75
End: 2020-07-31

## 2020-07-31 NOTE — OUTREACH NOTE
Call Center TCM Note      Responses   [facility] patient discharged from?  [place]   Does the patient have one of the following disease processes/diagnoses(primary or secondary)?  Total Joint Replacement   Joint surgery performed?  Knee   TCM attempt successful?  Yes   Call start time  1608   Call end time  1622   Has the patient been back in either the hospital or Emergency Department since discharge?  No   Discharge diagnosis  Right total knee arthro   Is patient permission given to speak with other caregiver?  Yes   List who call center can speak with  Miranda kraus   Person spoke with today (if not patient) and relationship  daughterMiranda   Does the patient have all medications related to this admission filled (includes all antibiotics, pain medications, etc.)  Yes   Is the patient taking all medications as directed (includes completed medication regime)?  Yes   Is the patient able to teach back alternate methods of pain control?  Ice   Does the patient have a follow up appointment with their surgeon?  Yes   Has the patient kept scheduled appointments due by today?  N/A   Comments  Patient does not have PCP follow up appt in place.    What is the Home health agency?   F   Has home health visited the patient within 72 hours of discharge?  Yes   Psychosocial issues?  No   Has the patient began therapy sessions (either in the home or as an out patient)?  Yes   If the patient has started attending therapy, what post op day did they begin to attend (either in home or as an out patient)?    HH PT today. POD #2   Does the patient have a wound vac in place?  N/A   Has the patient fallen since discharge?  No   Did the patient receive a copy of their discharge instructions?  Yes   Nursing interventions  Reviewed instructions with patient [daughter]   What is the patient's perception of their functional status since discharge?  Improving   Is the patient able to teach back signs and symptoms of  infection?  Temp >100.4 for 24h or longer, Incisional drainage, Blisters around incision, Increased swelling or redness around incision (not associated with surgical edema), Severe discomfort or pain, Changes in mobility, Shortness of breath or chest pain   Is the patient able to teach back how to prevent infection?  Check incision daily, Wash hands before and after touching incision, Keep incision covered if drainage   Is the patient able to teach back signs and symptoms of DVT?  Redness in calf, Swelling in calf, Severe pain in calf, Area hot to touch, Shortness of breath or chest pain   Did the patient implement home safety suggestions from pre-surgery classes if attended?  Yes [online classes]   Is the patient/caregiver able to teach back the hierarchy of who to call/visit for symptoms/problems? PCP, Specialist, Home health nurse, Urgent Care, ED, 911  Yes   TCM call completed?  Yes          Елена Polanco RN    7/31/2020, 16:22

## 2020-08-07 ENCOUNTER — PATIENT MESSAGE (OUTPATIENT)
Dept: FAMILY MEDICINE CLINIC | Facility: CLINIC | Age: 75
End: 2020-08-07

## 2020-08-11 ENCOUNTER — READMISSION MANAGEMENT (OUTPATIENT)
Dept: CALL CENTER | Facility: HOSPITAL | Age: 75
End: 2020-08-11

## 2020-08-11 NOTE — OUTREACH NOTE
Total Joint Week 2 Survey      Responses   [facility] patient discharged from?  [place]   Does the patient have one of the following disease processes/diagnoses(primary or secondary)?  Total Joint Replacement   Joint surgery performed?  Knee   Week 2 attempt successful?  Yes   Call start time  1351   Call end time  1353   Has the patient been back in either the hospital or Emergency Department since discharge?  No   Discharge diagnosis  Right total knee arthro   Does the patient have all medications related to this admission filled (includes all antibiotics, pain medications, etc.)  Yes   Is the patient taking all medications as directed (includes completed medication regime)?  Yes   Does the patient have a follow up appointment with their surgeon?  Yes   Has the patient kept scheduled appointments due by today?  Yes   Comments  Patient does not have PCP follow up appt in place.    Psychosocial issues?  No   Has the patient began therapy sessions (either in the home or as an out patient)?  Yes   If the patient has started attending therapy, what post op day did they begin to attend (either in home or as an out patient)?     PT POD #2.  Patient is having twice per week with in patient therapy   Does the patient have a wound vac in place?  N/A   Has the patient fallen since discharge?  No   Did the patient receive a copy of their discharge instructions?  Yes   What is the patient's perception of their functional status since discharge?  Improving   Week 2 call completed?  Yes   Wrap up additional comments  Patient states she is doing very well with knee.  Denies any needs during this call.          Randee Steiner RN

## 2020-08-26 ENCOUNTER — OFFICE VISIT (OUTPATIENT)
Dept: NEUROLOGY | Facility: CLINIC | Age: 75
End: 2020-08-26

## 2020-08-26 ENCOUNTER — READMISSION MANAGEMENT (OUTPATIENT)
Dept: CALL CENTER | Facility: HOSPITAL | Age: 75
End: 2020-08-26

## 2020-08-26 VITALS
BODY MASS INDEX: 24.22 KG/M2 | HEIGHT: 62 IN | WEIGHT: 131.6 LBS | TEMPERATURE: 97.1 F | HEART RATE: 81 BPM | DIASTOLIC BLOOD PRESSURE: 72 MMHG | SYSTOLIC BLOOD PRESSURE: 111 MMHG

## 2020-08-26 DIAGNOSIS — R41.3 MEMORY LOSS: Primary | ICD-10-CM

## 2020-08-26 DIAGNOSIS — R41.89 COGNITIVE DECLINE: ICD-10-CM

## 2020-08-26 PROCEDURE — 99213 OFFICE O/P EST LOW 20 MIN: CPT | Performed by: PSYCHIATRY & NEUROLOGY

## 2020-08-26 RX ORDER — MEMANTINE HYDROCHLORIDE 28 MG/1
28 CAPSULE, EXTENDED RELEASE ORAL DAILY
Qty: 30 CAPSULE | Refills: 3 | Status: SHIPPED | OUTPATIENT
Start: 2020-08-26 | End: 2020-10-26

## 2020-08-26 RX ORDER — TRAMADOL HYDROCHLORIDE 50 MG/1
50 TABLET ORAL EVERY 6 HOURS PRN
COMMUNITY
End: 2021-02-24

## 2020-08-26 RX ORDER — HYDROCODONE BITARTRATE AND ACETAMINOPHEN 7.5; 325 MG/1; MG/1
1 TABLET ORAL EVERY 6 HOURS PRN
COMMUNITY
End: 2021-02-24

## 2020-08-26 NOTE — OUTREACH NOTE
Total Joint Month 1 Survey      Responses   [facility] patient discharged from?  [place]   Does the patient have one of the following disease processes/diagnoses(primary or secondary)?  Total Joint Replacement   Joint surgery performed?  Knee   Month 1 attempt successful?  Yes   Call start time  1014   Call end time  1016   Has the patient been back in either the hospital or Emergency Department since discharge?  No   Discharge diagnosis  Right total knee arthro   Is patient permission given to speak with other caregiver?  Yes   List who call center can speak with  Miranda kraus   Person spoke with today (if not patient) and relationship  daughterMiranda   Is the patient taking all medications as directed (includes completed medication regime)?  Yes   Is the patient able to teach back alternate methods of pain control?  Knee-elevation/no pillow under knee, Correct alignment, Short, frequent activity   Has the patient kept scheduled appointments due by today?  Yes   Is the patient still receiving Home Health Services?  Yes   Psychosocial comments  Still having in home PT for 2 more weeks then will go to outpatient PT   Is the patient still attending therapy sessions(either in the home or as an outpatient)?  Yes   Has the patient fallen since discharge?  No   What is the patient's perception of their functional status since discharge?  Improving   Is the patient able to teach back signs and symptoms of infection?  Temp >100.4 for 24h or longer, Incisional drainage, Blisters around incision, Increased swelling or redness around incision (not associated with surgical edema), Severe discomfort or pain, Changes in mobility, Shortness of breath or chest pain   Is the patient/caregiver able to teach back the hierarchy of who to call/visit for symptoms/problems? PCP, Specialist, Home health nurse, Urgent Care, ED, 911  Yes   Month 1 call completed?  Yes   Wrap up additional comments  Patient states she is  doing very well with knee.  Denies any needs during this call.          Byron Morrow RN

## 2020-08-26 NOTE — PROGRESS NOTES
Subjective: Memory loss    Patient ID: Salud Gama is a 75 y.o. female.    History of Present Illness, yearly f/u  Memory loss, stable  currently taking Namenda 10 mg   stopped the Ryzadyne due to side effects  and taking OTC B-12. Vitamin d   Patient here today with her daughter and feels her memory has declined.   Patient short term is not as good patient has a tendency of not taking her medication in the past due to up setting her stomach    Patient recently moved to [place] went to a smaller house and closer to her daughter.     Patient been under more stress found out her spouse has lymphoma and feels prognosis is stable not on chemo.   Patient sleeping good s/p total right knee replacement 7/29/2020 currently walks with a cane and having PT at home.     =========7/2019=======================  4 month f/u, patient has mild short term memory problems, and other cognitive problems currently not taking namenda 5 mg bid  Or  Razadyne.  due to UTI and medication bothered her stomach    lab results.  B12 202     Patient onset symptoms occurred gradually no family hx of dementia.   Patient states all symptoms are about the same, patient is not driving did not renew her drivers license. Patient feels like she's doing well still has some confusion.   =====================================    The following portions of the patient's history were reviewed and updated as appropriate: allergies, current medications, past family history, past medical history, past social history, past surgical history and problem list.    Family History   Problem Relation Age of Onset   • Arthritis Mother    • Diabetes Mother    • Hyperlipidemia Mother    • Heart disease Father    • Diabetes Father    • Colon cancer Father    • Arthritis Sister    • Seizures Sister    • Arthritis Brother    • Diabetes Brother    • Malig Hyperthermia Neg Hx        Past Medical History:   Diagnosis Date   • Bruise     RIGHT FOOT FROM FALL FEW DAYS AGO/PT  DAUGHTER IS GOING TO CALL DR PEDRO OFFICE TOMORROW AND NOTIFY THEM OF RECENT FALL   • Depression    • Falls frequently     R/T KNEE AND BALANCE ISSUE   • History of cervical cancer 1974   • History of hematuria    • History of shingles    • Internal hemorrhoids    • Memory loss     SHORT TERM   • Osteoarthritis    • Osteopenia of multiple sites    • Primary osteoarthritis of right knee    • Pseudodementia    • Right knee pain    • Urinary retention    • Varicose veins of legs        Social History     Socioeconomic History   • Marital status:      Spouse name: Not on file   • Number of children: Not on file   • Years of education: Not on file   • Highest education level: Not on file   Tobacco Use   • Smoking status: Never Smoker   • Smokeless tobacco: Never Used   Substance and Sexual Activity   • Alcohol use: No     Frequency: Never   • Drug use: No   • Sexual activity: Defer         Current Outpatient Medications:   •  aspirin 81 MG EC tablet, Take 1 tablet by mouth 2 (two) times a day for 30 days., Disp: 60 tablet, Rfl: 0  •  Cholecalciferol (VITAMIN D) 25 MCG (1000 UT) tablet, Take 1 tablet by mouth Daily., Disp: , Rfl:   •  citalopram (CeleXA) 20 MG tablet, Take 1 tablet by mouth Daily., Disp: 30 tablet, Rfl: 12  •  HYDROcodone-acetaminophen (NORCO) 7.5-325 MG per tablet, Take 1 tablet by mouth Every 6 (Six) Hours As Needed for Moderate Pain ., Disp: , Rfl:   •  memantine (NAMENDA) 10 MG tablet, Take 1 tablet by mouth Daily., Disp: 30 tablet, Rfl: 4  •  traMADol (ULTRAM) 50 MG tablet, Take 50 mg by mouth Every 6 (Six) Hours As Needed for Moderate Pain ., Disp: , Rfl:   •  vitamin B-12 (CYANOCOBALAMIN) 100 MCG tablet, Take 50 mcg by mouth Daily. HOLD PRIOR TO SURG, Disp: , Rfl:     Review of Systems   Constitutional: Negative for appetite change and fatigue.   HENT: Negative for congestion, sinus pressure and sinus pain.    Eyes: Negative for pain and itching.   Respiratory: Negative for choking and  shortness of breath.    Cardiovascular: Negative for chest pain and palpitations.   Gastrointestinal: Negative for constipation and diarrhea.   Endocrine: Negative for cold intolerance and heat intolerance.   Genitourinary: Positive for frequency. Negative for difficulty urinating.   Musculoskeletal: Positive for gait problem. Negative for back pain.   Allergic/Immunologic: Negative for environmental allergies.   Neurological: Negative for dizziness, tremors, seizures, syncope, facial asymmetry, speech difficulty, weakness, light-headedness, numbness and headaches.   Psychiatric/Behavioral: Positive for confusion. Negative for agitation.          I have reviewed ROS completed by medical assistant.     Objective:    Neurologic Exam     Mental Status   Oriented to person, place, and time.   Level of consciousness: alert    Cranial Nerves     CN III, IV, VI   Pupils are equal, round, and reactive to light.  Extraocular motions are normal.     Gait, Coordination, and Reflexes     Gait  Gait: normal    Tremor   Resting tremor: absent      Physical Exam   Constitutional: She is oriented to person, place, and time. She appears well-developed and well-nourished.   HENT:   Head: Normocephalic.   Eyes: Pupils are equal, round, and reactive to light. Conjunctivae and EOM are normal.   Neurological: She is oriented to person, place, and time. Gait normal.   Psychiatric: She has a normal mood and affect. Her behavior is normal.   Vitals reviewed.      Assessment/Plan:    Salud was seen today for memory loss.    Diagnoses and all orders for this visit:    Memory loss    Cognitive decline      Pt has been taking Namenda 10 mg,   Increase to Namenda 28xr       This document has been electronically signed by Joseph Seipel, MD on August 26, 2020 14:58

## 2020-09-14 ENCOUNTER — TREATMENT (OUTPATIENT)
Dept: PHYSICAL THERAPY | Facility: CLINIC | Age: 75
End: 2020-09-14

## 2020-09-14 DIAGNOSIS — G89.29 CHRONIC PAIN OF BOTH KNEES: ICD-10-CM

## 2020-09-14 DIAGNOSIS — M25.561 CHRONIC PAIN OF BOTH KNEES: ICD-10-CM

## 2020-09-14 DIAGNOSIS — M25.562 CHRONIC PAIN OF BOTH KNEES: ICD-10-CM

## 2020-09-14 DIAGNOSIS — Z96.651 HISTORY OF TOTAL KNEE ARTHROPLASTY, RIGHT: Primary | ICD-10-CM

## 2020-09-14 PROCEDURE — 97530 THERAPEUTIC ACTIVITIES: CPT | Performed by: PHYSICAL THERAPIST

## 2020-09-14 PROCEDURE — 97110 THERAPEUTIC EXERCISES: CPT | Performed by: PHYSICAL THERAPIST

## 2020-09-14 PROCEDURE — 97140 MANUAL THERAPY 1/> REGIONS: CPT | Performed by: PHYSICAL THERAPIST

## 2020-09-14 PROCEDURE — 97161 PT EVAL LOW COMPLEX 20 MIN: CPT | Performed by: PHYSICAL THERAPIST

## 2020-09-14 NOTE — PROGRESS NOTES
Physical Therapy Initial Evaluation and Plan of Care    Patient: Salud Gama   : 1945  Diagnosis/ICD-10 Code:  R chronic knee pain, history of R TKA  Referring practitioner: Panda Michel MD  Date of Initial Visit: 2020  Today's Date: 2020  Patient seen for 1 session             Subjective Evaluation    History of Present Illness  Mechanism of injury: Patient is a 75 y.o. WF with dementia, accompanied by her daughter today.  She is s/p RIGHT TKA on 2020.  She has had home health PT since coming home after surgery and is ready to start outpatient PT.  Daughter Miranda reports patient's spouse Eugene is her primary caregiver and will be bringing her to most appointments.  They recently moved  Into a one-story home with an unfinished basement.  Patient has not been down to basement yet.  Two steps to enter home.  She uses cane for community ambulation and is independent at home without cane.  Patient has an ergometer at home and is performing 5 min BID.  HH PT discussed ~ 4 cm R LLD, dtr discussed with MD.  Patient has used shoe lift in the past but lost during recent move.  Plans to have L TKA  In the future.    Quality of life: good    Patient Goals  Patient goals for therapy: decreased pain, increased motion, improved balance and increased strength             Objective Oxford knee score indicates 65% impairment with limitations in stairs, limping, kneeling, walking.  Knee AROM flexion = 95 deg R, 110 deg L; extension lag = -20 deg R,  -10 deg L.  Improved to -15 deg extension lag R after stretching.  Incision healing nicely R knee with glue still sloughing off.  Patellar mobility mildly limited without pain.  Ambulates with antalgia and cane on L as needed for community ambulation.  Her daughter says she doesn't use it much at home.  No significant swelling noted.  MMT = 4/5 R LE, 4+/5 L LE.  Possible scoliosis and LLD affecting gait, patient appears to hike R hip and swing R leg but  daughter reports gait is much better now than before surgery.        Assessment & Plan     Assessment  Impairments: abnormal gait, abnormal or restricted ROM, impaired balance, impaired physical strength, lacks appropriate home exercise program and pain with function  Barriers to therapy: dementia, scoliosis, leg length discrepancy  Prognosis: good  Functional Limitations: sleeping, walking, uncomfortable because of pain, moving in bed and standing  Goals  Plan Goals: Patient independent with HEP in 2 weeks  Tolerate 10 min Nustep in 2 weeks  Able to SLS 10 sec in 2 weeks  Improve function as evidenced by Oxford knee score of 20% or less by discharge (65% impairment initially)  Able to extend R knee to -5 deg by discharge to improve gait pattern  Perform 10 reps of repeated sit to stand without arms in 30 seconds by discharge       Plan  Therapy options: will be seen for skilled physical therapy services  Planned modality interventions: cryotherapy  Other planned modality interventions: physical modalities as needed  Planned therapy interventions: manual therapy, strengthening, stretching, therapeutic activities, home exercise program, gait training, functional ROM exercises, flexibility and balance/weight-bearing training  Treatment plan discussed with: patient, caregiver and family  Plan details: Plan to continue PT 2x's per week up to 12 visits if needed.        Timed:         Manual Therapy:    15     mins  19569;     Therapeutic Exercise:    15     mins  46554;     Neuromuscular Cordell:        mins  66944;    Therapeutic Activity:      15    mins  41101;     Gait Training:           mins  85885;     Ultrasound:          mins  39363;    Ionto                                   mins   46989  Self-care  ____ mins 74739    Un-Timed:  Electrical Stimulation:         mins  70641 ( );  Dry Needling          mins self-pay  Traction          mins 20112  Low Eval     15     Mins  60094  Mod Eval          Mins   00424  High Eval                            Mins  26777  Canal repositioning _____ mins  27818        Timed Treatment:   45   mins   Total Treatment:     60   mins    PT SIGNATURE: Robin A Sprigler, PT   DATE TREATMENT INITIATED: 9/14/2020    Initial Certification  Certification Period: 12/13/2020  I certify that the therapy services are furnished while this patient is under my care.  The services outlined above are required by this patient, and will be reviewed every 90 days.     PHYSICIAN:  Panda Michel, MD __________________________________________________________________________________________________________     DATE: __________________________________________________________________________________________________________________________________    Please sign and return via fax to [phone]. Thank you, [facility].

## 2020-09-16 ENCOUNTER — TREATMENT (OUTPATIENT)
Dept: PHYSICAL THERAPY | Facility: CLINIC | Age: 75
End: 2020-09-16

## 2020-09-16 DIAGNOSIS — M25.562 CHRONIC PAIN OF BOTH KNEES: ICD-10-CM

## 2020-09-16 DIAGNOSIS — M25.561 CHRONIC PAIN OF BOTH KNEES: ICD-10-CM

## 2020-09-16 DIAGNOSIS — G89.29 CHRONIC PAIN OF BOTH KNEES: ICD-10-CM

## 2020-09-16 DIAGNOSIS — Z96.651 HISTORY OF TOTAL KNEE ARTHROPLASTY, RIGHT: Primary | ICD-10-CM

## 2020-09-16 PROCEDURE — 97140 MANUAL THERAPY 1/> REGIONS: CPT | Performed by: PHYSICAL THERAPIST

## 2020-09-16 PROCEDURE — 97530 THERAPEUTIC ACTIVITIES: CPT | Performed by: PHYSICAL THERAPIST

## 2020-09-16 PROCEDURE — 97110 THERAPEUTIC EXERCISES: CPT | Performed by: PHYSICAL THERAPIST

## 2020-09-16 NOTE — PROGRESS NOTES
Physical Therapy Daily Progress Note      Patient: Salud Gama   : 1945  Diagnosis/ICD-10 Code:  History of total knee arthroplasty, right [Z96.651]  Referring practitioner: Panda Michel MD  Date of Initial Visit: Type: THERAPY  Noted: 2020  Today's Date: 2020  Patient seen for 2 sessions         Salud Gama reports: she is doing well and her and her  have been doing her HEP together. Pt. States the hardest thing for her still is to straighten the knee.    Objective   See Exercise, Manual, and Modality Logs for complete treatment.     Assessment/Plan   Pt. Responds well to treatment this date and continues to improve functional knee bend despite still limited. Pt. Is still lacking a significant amount of knee extension and needs continued attention however knee id respond well to overpressure this date along with ice packed weighted knee extension and had improved extension when leaving treatment this date.    Progress per Plan of Care           Timed:         Manual Therapy:    15     mins  67588;     Therapeutic Exercise:    15     mins  00719;     Neuromuscular Cordell:        mins  95635;    Therapeutic Activity:     15     mins  37969;     Gait Training:           mins  43868;     Ultrasound:          mins  42307;    Ionto                                   mins   40901  Self Care                            mins   99031  Canalith Repos                   mins  4209    Un-Timed:  Electrical Stimulation:         mins  93530 ( );  Dry Needling          mins self-pay  Traction          mins 09295  Low Eval          Mins  04055  Mod Eval          Mins  53303  High Eval                            Mins  56533    Timed Treatment:   45   mins   Total Treatment:     55   mins    Vee Bedoya PTA  Physical Therapist Assistant License #[id number]

## 2020-09-22 ENCOUNTER — TREATMENT (OUTPATIENT)
Dept: PHYSICAL THERAPY | Facility: CLINIC | Age: 75
End: 2020-09-22

## 2020-09-22 ENCOUNTER — PATIENT MESSAGE (OUTPATIENT)
Dept: FAMILY MEDICINE CLINIC | Facility: CLINIC | Age: 75
End: 2020-09-22

## 2020-09-22 DIAGNOSIS — G89.29 CHRONIC PAIN OF BOTH KNEES: ICD-10-CM

## 2020-09-22 DIAGNOSIS — Z96.651 HISTORY OF TOTAL KNEE ARTHROPLASTY, RIGHT: Primary | ICD-10-CM

## 2020-09-22 DIAGNOSIS — M25.561 CHRONIC PAIN OF BOTH KNEES: ICD-10-CM

## 2020-09-22 DIAGNOSIS — M25.562 CHRONIC PAIN OF BOTH KNEES: ICD-10-CM

## 2020-09-22 PROCEDURE — 97140 MANUAL THERAPY 1/> REGIONS: CPT | Performed by: PHYSICAL THERAPIST

## 2020-09-22 PROCEDURE — 97110 THERAPEUTIC EXERCISES: CPT | Performed by: PHYSICAL THERAPIST

## 2020-09-22 NOTE — PROGRESS NOTES
Physical Therapy Daily Progress Note      Patient: Salud Gama   : 1945  Diagnosis/ICD-10 Code:  History of total knee arthroplasty, right [Z96.651]  Referring practitioner: No ref. provider found  Date of Initial Visit: Type: THERAPY  Noted: 2020  Today's Date: 2020  Patient seen for 3 sessions         Salud Gama reports: no pain or new changes at this time.    Objective   See Exercise, Manual, and Modality Logs for complete treatment.     Assessment/Plan   Pt. Still has limited extension and it continues to be stressed to Pt. And  to stretch this frequently. Pt. Was given HEP for sit to stands, marches, SLS, heel prop for extension, and heel slides. Pt. Endurance and upright tolerance progressing.    Progress per Plan of Care           Timed:         Manual Therapy:    10     mins  84788;     Therapeutic Exercise:    20     mins  54738;     Neuromuscular Cordell:        mins  58086;    Therapeutic Activity:          mins  87534;     Gait Training:           mins  47801;     Ultrasound:          mins  91393;    Ionto                                   mins   29742  Self Care                            mins   74133  Canalith Repos                   mins  4209    Un-Timed:  Electrical Stimulation:         mins  12761 ( );  Dry Needling          mins self-pay  Traction          mins 90082  Low Eval          Mins  90278  Mod Eval          Mins  84332  High Eval                            Mins  53925    Timed Treatment:   30   mins   Total Treatment:     40   mins    Vee Bedoya PTA  Physical Therapist Assistant License #[id number]

## 2020-09-24 ENCOUNTER — TREATMENT (OUTPATIENT)
Dept: PHYSICAL THERAPY | Facility: CLINIC | Age: 75
End: 2020-09-24

## 2020-09-24 DIAGNOSIS — M25.561 CHRONIC PAIN OF BOTH KNEES: ICD-10-CM

## 2020-09-24 DIAGNOSIS — G89.29 CHRONIC PAIN OF BOTH KNEES: ICD-10-CM

## 2020-09-24 DIAGNOSIS — M25.562 CHRONIC PAIN OF BOTH KNEES: ICD-10-CM

## 2020-09-24 DIAGNOSIS — Z96.651 HISTORY OF TOTAL KNEE ARTHROPLASTY, RIGHT: Primary | ICD-10-CM

## 2020-09-24 PROCEDURE — 97140 MANUAL THERAPY 1/> REGIONS: CPT | Performed by: PHYSICAL THERAPIST

## 2020-09-24 PROCEDURE — 97110 THERAPEUTIC EXERCISES: CPT | Performed by: PHYSICAL THERAPIST

## 2020-09-24 NOTE — PROGRESS NOTES
Physical Therapy Daily Progress Note    Patient: Salud Gama   : 1945  Diagnosis/ICD-10 Code:  History of total knee arthroplasty, right [Z96.651]  Referring practitioner: No ref. provider found  Date of Initial Visit: Type: THERAPY  Noted: 2020  Today's Date: 2020  Patient seen for 4 sessions             Subjective Patient/daughter report no new complaints.    Objective   See Exercise, Manual, and Modality Logs for complete treatment. R knee extension continues to increase, measuring -5 deg lag today.  Ambulates with less antalgia since evaluation.  Tried weighted leg press today with better understanding, though still somewhat confused what to do.  SLS still difficult with poor confidence B.      Assessment/Plan  Patient independent with HEP in 2 weeks - MET with assistance from family  Tolerate 10 min Nustep in 2 weeks - NOT MET  Able to SLS 10 sec in 2 weeks - NOT MET  Improve function as evidenced by Oxford knee score of 20% or less by discharge (65% impairment initially) - NOT MET  Able to extend R knee to -5 deg by discharge to improve gait pattern - MET  Perform 10 reps of repeated sit to stand without arms in 30 seconds by discharge  - NOT MET    Progress per Plan of Care up to 12 visits if needed.           Timed:         Manual Therapy:    10     mins  98604;     Therapeutic Exercise:    20     mins  64097;     Neuromuscular Cordell:        mins  32764;    Therapeutic Activity:          mins  54275;     Gait Training:           mins  01040;     Ultrasound:          mins  56069;    Ionto                                   mins   92194  Self Care                            mins   98071      Un-Timed:  Electrical Stimulation:         mins  21923 ( );  Dry Needling          mins self-pay  Traction          mins 12096  Low Eval          Mins  48587  Mod Eval          Mins  88886  High Eval                            Mins  70144  Canalith Repos                   mins   09671    Timed Treatment:   30   mins   Total Treatment:     40   mins    Robin A Sprigler, PT  Physical Therapist

## 2020-09-25 ENCOUNTER — READMISSION MANAGEMENT (OUTPATIENT)
Dept: CALL CENTER | Facility: HOSPITAL | Age: 75
End: 2020-09-25

## 2020-09-25 NOTE — OUTREACH NOTE
Total Joint Month 2 Survey      Responses   [facility] patient discharged from?  [place]   Does the patient have one of the following disease processes/diagnoses(primary or secondary)?  Total Joint Replacement   Joint surgery performed?  Hip   Month 2 attempt successful?  Yes   Call start time  1420   Call end time  1423   Has the patient been back in either the hospital or Emergency Department since discharge?  No   Is the patient taking all medications as directed (includes completed medication regime)?  Yes   Has the patient kept scheduled appointments due by today?  Yes   Is the patient still receiving Home Health Services?  No   Is the patient still attending therapy sessions(either in the home or as an outpatient)?  Yes   Has the patient fallen since discharge?  No   What is the patient's perception of their functional status since discharge?  Improving   Month 2 Call Completed?  Yes   Wrap up additional comments  Pt's daughter states that she is doing great and is doing out-pt PT.          Marbella Aguayo RN

## 2020-09-28 ENCOUNTER — TREATMENT (OUTPATIENT)
Dept: PHYSICAL THERAPY | Facility: CLINIC | Age: 75
End: 2020-09-28

## 2020-09-28 DIAGNOSIS — M25.562 CHRONIC PAIN OF BOTH KNEES: ICD-10-CM

## 2020-09-28 DIAGNOSIS — G89.29 CHRONIC PAIN OF BOTH KNEES: ICD-10-CM

## 2020-09-28 DIAGNOSIS — Z96.651 HISTORY OF TOTAL KNEE ARTHROPLASTY, RIGHT: Primary | ICD-10-CM

## 2020-09-28 DIAGNOSIS — M25.561 CHRONIC PAIN OF BOTH KNEES: ICD-10-CM

## 2020-09-28 PROCEDURE — 97140 MANUAL THERAPY 1/> REGIONS: CPT | Performed by: PHYSICAL THERAPIST

## 2020-09-28 PROCEDURE — 97110 THERAPEUTIC EXERCISES: CPT | Performed by: PHYSICAL THERAPIST

## 2020-09-28 NOTE — PROGRESS NOTES
Physical Therapy Daily Progress Note    Patient: Salud Gama   : 1945  Diagnosis/ICD-10 Code:  History of total knee arthroplasty, right [Z96.651]  Referring practitioner: No ref. provider found  Date of Initial Visit: Type: THERAPY  Noted: 2020  Today's Date: 2020  Patient seen for 5 sessions             Subjective Patient is excited that she had NO pain yesterday for the first time.  Reports feeling well this morning with no pain.  She reports her daughter is more patient with her than her  when it comes to exercises.    Objective   See Exercise, Manual, and Modality Logs for complete treatment. Patient comfortable walking without cane in clinic.  Balance and knee extension improving.  She is now 9 weeks post-op.  Better understanding of exercises today. Able to tolerate 10 min on Nustep today.  Add seated ham stretch NEXT.      Assessment/Plan  Patient independent with HEP in 2 weeks - MET with assistance from family  Tolerate 10 min Nustep in 2 weeks - MET  Able to SLS 10 sec in 2 weeks - NOT MET  Improve function as evidenced by Oxford knee score of 20% or less by discharge (65% impairment initially) - NOT MET  Able to extend R knee to -5 deg by discharge to improve gait pattern - MET  Perform 10 reps of repeated sit to stand without arms in 30 seconds by discharge  - MET    Progress per Plan of Care up to 12 visits if needed           Timed:         Manual Therapy:    10     mins  14849;     Therapeutic Exercise:    20     mins  78735;     Neuromuscular Cordell:        mins  12873;    Therapeutic Activity:          mins  89639;     Gait Training:           mins  33609;     Ultrasound:          mins  55931;    Ionto                                   mins   06749  Self Care                            mins   45942      Un-Timed:  Electrical Stimulation:         mins  02993 ( );  Dry Needling          mins self-pay  Traction          mins 73643  Low Eval          Mins   83254  Mod Eval          Mins  12047  High Eval                            Mins  37390  Canalith Repos                   mins  81639    Timed Treatment:   30   mins   Total Treatment:     40   mins    Robin A Sprigler, PT  Physical Therapist

## 2020-10-01 ENCOUNTER — TREATMENT (OUTPATIENT)
Dept: PHYSICAL THERAPY | Facility: CLINIC | Age: 75
End: 2020-10-01

## 2020-10-01 DIAGNOSIS — G89.29 CHRONIC PAIN OF BOTH KNEES: ICD-10-CM

## 2020-10-01 DIAGNOSIS — M25.561 CHRONIC PAIN OF BOTH KNEES: ICD-10-CM

## 2020-10-01 DIAGNOSIS — M25.562 CHRONIC PAIN OF BOTH KNEES: ICD-10-CM

## 2020-10-01 DIAGNOSIS — Z96.651 HISTORY OF TOTAL KNEE ARTHROPLASTY, RIGHT: Primary | ICD-10-CM

## 2020-10-01 PROCEDURE — 97530 THERAPEUTIC ACTIVITIES: CPT | Performed by: PHYSICAL THERAPIST

## 2020-10-01 PROCEDURE — 97140 MANUAL THERAPY 1/> REGIONS: CPT | Performed by: PHYSICAL THERAPIST

## 2020-10-01 PROCEDURE — 97110 THERAPEUTIC EXERCISES: CPT | Performed by: PHYSICAL THERAPIST

## 2020-10-01 NOTE — PROGRESS NOTES
Physical Therapy Daily Progress Note    Patient: Salud Gama   : 1945  Diagnosis/ICD-10 Code:  History of total knee arthroplasty, right [Z96.651]  Referring practitioner: No ref. provider found  Date of Initial Visit: Type: THERAPY  Noted: 2020  Today's Date: 10/1/2020  Patient seen for 6 sessions             Subjective Patient still doing well with no pain today.    Objective   See Exercise, Manual, and Modality Logs for complete treatment.       Assessment/Plan  Patient independent with HEP in 2 weeks - MET with assistance from family  Tolerate 10 min Nustep in 2 weeks - MET  Able to SLS 10 sec in 2 weeks - NOT MET  Improve function as evidenced by Oxford knee score of 20% or less by discharge (65% impairment initially) - NOT MET  Able to extend R knee to -5 deg by discharge to improve gait pattern - MET  Perform 10 reps of repeated sit to stand without arms in 30 seconds by discharge  - MET    Progress per Plan of Care up to 12 visits if needed.           Timed:         Manual Therapy:    10     mins  17365;     Therapeutic Exercise:    15     mins  58306;     Neuromuscular Cordell:        mins  97998;    Therapeutic Activity:    15      mins  78777;     Gait Training:           mins  50416;     Ultrasound:          mins  25959;    Ionto                                   mins   73207  Self Care                            mins   87999      Un-Timed:  Electrical Stimulation:         mins  74815 ( );  Dry Needling          mins self-pay  Traction          mins 20269  Low Eval          Mins  60745  Mod Eval          Mins  59611  High Eval                            Mins  26687  Canalith Repos                   mins  49874    Timed Treatment:   40   mins   Total Treatment:     50   mins    Robin A Sprigler, PT  Physical Therapist

## 2020-10-05 ENCOUNTER — TREATMENT (OUTPATIENT)
Dept: PHYSICAL THERAPY | Facility: CLINIC | Age: 75
End: 2020-10-05

## 2020-10-05 DIAGNOSIS — G89.29 CHRONIC PAIN OF BOTH KNEES: ICD-10-CM

## 2020-10-05 DIAGNOSIS — M25.562 CHRONIC PAIN OF BOTH KNEES: ICD-10-CM

## 2020-10-05 DIAGNOSIS — M25.561 CHRONIC PAIN OF BOTH KNEES: ICD-10-CM

## 2020-10-05 DIAGNOSIS — Z96.651 HISTORY OF TOTAL KNEE ARTHROPLASTY, RIGHT: Primary | ICD-10-CM

## 2020-10-05 PROCEDURE — 97140 MANUAL THERAPY 1/> REGIONS: CPT | Performed by: PHYSICAL THERAPIST

## 2020-10-05 PROCEDURE — 97110 THERAPEUTIC EXERCISES: CPT | Performed by: PHYSICAL THERAPIST

## 2020-10-05 NOTE — PROGRESS NOTES
Physical Therapy Daily Progress Note    Patient: Salud Gama   : 1945  Diagnosis/ICD-10 Code:  R TKAReferring practitioner: No ref. provider found  Date of Initial Visit: 2020  Today's Date: 10/5/2020  Patient seen for 7 sessions             Subjective Patient reports she is feeling well today.  She presents without cane today.  Got to go out to lunch for her sister's birthday.    Objective   See Exercise, Manual, and Modality Logs for complete treatment. Ambulates well without cane, some antalgia but due to leg length discrepancy.  Knee extension continues to improve.      Assessment/Plan  Patient independent with HEP in 2 weeks - MET with assistance from family  Tolerate 10 min Nustep in 2 weeks - MET  Able to SLS 10 sec in 2 weeks - MET  Improve function as evidenced by Oxford knee score of 20% or less by discharge (65% impairment initially) - NOT MET  Able to extend R knee to -5 deg by discharge to improve gait pattern - MET  Perform 10 reps of repeated sit to stand without arms in 30 seconds by discharge  - MET    Progress per Plan of Care up to 12 visits            Timed:         Manual Therapy:    10     mins  18351;     Therapeutic Exercise:    20     mins  26694;     Neuromuscular Cordell:        mins  71949;    Therapeutic Activity:          mins  34147;     Gait Training:           mins  83488;     Ultrasound:          mins  42923;    Ionto                                   mins   98483  Self Care                            mins   40975      Un-Timed:  Electrical Stimulation:         mins  21225 ( );  Dry Needling          mins self-pay  Traction          mins 91677  Low Eval          Mins  86450  Mod Eval          Mins  72798  High Eval                            Mins  88883  Canalith Repos                   mins  83074    Timed Treatment:   30   mins   Total Treatment:     40   mins    Robin A Sprigler, PT  Physical Therapist

## 2020-10-07 ENCOUNTER — TREATMENT (OUTPATIENT)
Dept: PHYSICAL THERAPY | Facility: CLINIC | Age: 75
End: 2020-10-07

## 2020-10-07 DIAGNOSIS — M25.561 CHRONIC PAIN OF BOTH KNEES: ICD-10-CM

## 2020-10-07 DIAGNOSIS — Z96.651 HISTORY OF TOTAL KNEE ARTHROPLASTY, RIGHT: Primary | ICD-10-CM

## 2020-10-07 DIAGNOSIS — M25.562 CHRONIC PAIN OF BOTH KNEES: ICD-10-CM

## 2020-10-07 DIAGNOSIS — G89.29 CHRONIC PAIN OF BOTH KNEES: ICD-10-CM

## 2020-10-07 PROCEDURE — 97110 THERAPEUTIC EXERCISES: CPT | Performed by: PHYSICAL THERAPIST

## 2020-10-07 PROCEDURE — 97140 MANUAL THERAPY 1/> REGIONS: CPT | Performed by: PHYSICAL THERAPIST

## 2020-10-07 NOTE — PROGRESS NOTES
Physical Therapy Daily Progress Note    Patient: Salud Gama   : 1945  Diagnosis/ICD-10 Code:  History of total knee arthroplasty, right [Z96.651]  Referring practitioner: Panda Michel MD  Date of Initial Visit: Type: THERAPY  Noted: 2020  Today's Date: 10/7/2020  Patient seen for 8 sessions             Subjective No new complaints.    Objective   See Exercise, Manual, and Modality Logs for complete treatment. Walking freely without AD now.  Extension continues to improve slowly.      Assessment/Plan  Patient independent with HEP in 2 weeks - MET with assistance from family  Tolerate 10 min Nustep in 2 weeks - MET  Able to SLS 10 sec in 2 weeks - MET  Improve function as evidenced by Oxford knee score of 20% or less by discharge (65% impairment initially) - NOT MET  Able to extend R knee to -5 deg by discharge to improve gait pattern - MET  Perform 10 reps of repeated sit to stand without arms in 30 seconds by discharge  - MET    Progress per Plan of Care up to 12 visits if needed.           Timed:         Manual Therapy:    10     mins  48425;     Therapeutic Exercise:    20     mins  18374;     Neuromuscular Cordell:        mins  96030;    Therapeutic Activity:          mins  01337;     Gait Training:           mins  71675;     Ultrasound:          mins  71163;    Ionto                                   mins   98847  Self Care                            mins   48762      Un-Timed:  Electrical Stimulation:         mins  77239 ( );  Dry Needling          mins self-pay  Traction          mins 27750  Low Eval          Mins  74168  Mod Eval          Mins  79066  High Eval                            Mins  22529  Canalith Repos                   mins  69066    Timed Treatment:   30   mins   Total Treatment:     40   mins    Robin A Sprigler, PT  Physical Therapist

## 2020-10-12 ENCOUNTER — TREATMENT (OUTPATIENT)
Dept: PHYSICAL THERAPY | Facility: CLINIC | Age: 75
End: 2020-10-12

## 2020-10-12 DIAGNOSIS — Z96.651 HISTORY OF TOTAL KNEE ARTHROPLASTY, RIGHT: Primary | ICD-10-CM

## 2020-10-12 DIAGNOSIS — G89.29 CHRONIC PAIN OF BOTH KNEES: ICD-10-CM

## 2020-10-12 DIAGNOSIS — M25.561 CHRONIC PAIN OF BOTH KNEES: ICD-10-CM

## 2020-10-12 DIAGNOSIS — M25.562 CHRONIC PAIN OF BOTH KNEES: ICD-10-CM

## 2020-10-12 PROCEDURE — 97140 MANUAL THERAPY 1/> REGIONS: CPT | Performed by: PHYSICAL THERAPIST

## 2020-10-12 PROCEDURE — 97110 THERAPEUTIC EXERCISES: CPT | Performed by: PHYSICAL THERAPIST

## 2020-10-12 NOTE — PROGRESS NOTES
Physical Therapy Daily Progress Note      Patient: Salud Gama   : 1945  Diagnosis/ICD-10 Code:  History of total knee arthroplasty, right [Z96.651]  Referring practitioner: Panda Michel MD  Date of Initial Visit: Type: THERAPY  Noted: 2020  Today's Date: 10/12/2020  Patient seen for 9 sessions         Salud Gama reports: she had a good weekend and she continues to feel improvement daily with less pain and improving function.    Objective   See Exercise, Manual, and Modality Logs for complete treatment.     Assessment/Plan  Pt. Presents with poor upright posture throughout treatment and responds momentarily to VC's for correction then returns to poor poor. Posture presents with forward round of shoulders and forward head along with L lateral lean. This postural deficit leads to poor quality of gait throughout having and abnormal gait with right hip hike and intermittent circumduction's. Pt. Has poor knee extension with gait and ambulates foot flat rather than with heel strike and toe off. Pt. Is showing improved extension at this time and tolerates greater overpressure for stretch.    Progress per Plan of Care           Timed:         Manual Therapy:    10     mins  07809;     Therapeutic Exercise:    20     mins  88737;     Neuromuscular Cordell:        mins  10914;    Therapeutic Activity:          mins  48629;     Gait Training:           mins  11935;     Ultrasound:          mins  29399;    Ionto                                   mins   69467  Self Care                            mins   27133  Canalith Repos                   mins  4209    Un-Timed:  Electrical Stimulation:         mins  34300 ( );  Dry Needling          mins self-pay  Traction          mins 68533  Low Eval          Mins  63379  Mod Eval          Mins  69950  High Eval                            Mins  39749    Timed Treatment:   30   mins   Total Treatment:     40   mins    Vee Bedoya PTA  Physical Therapist  Assistant License #[id number]

## 2020-10-21 ENCOUNTER — TREATMENT (OUTPATIENT)
Dept: PHYSICAL THERAPY | Facility: CLINIC | Age: 75
End: 2020-10-21

## 2020-10-21 DIAGNOSIS — Z96.651 HISTORY OF TOTAL KNEE ARTHROPLASTY, RIGHT: Primary | ICD-10-CM

## 2020-10-21 DIAGNOSIS — M25.561 CHRONIC PAIN OF BOTH KNEES: ICD-10-CM

## 2020-10-21 DIAGNOSIS — G89.29 CHRONIC PAIN OF BOTH KNEES: ICD-10-CM

## 2020-10-21 DIAGNOSIS — M25.562 CHRONIC PAIN OF BOTH KNEES: ICD-10-CM

## 2020-10-21 PROCEDURE — 97140 MANUAL THERAPY 1/> REGIONS: CPT | Performed by: PHYSICAL THERAPIST

## 2020-10-21 PROCEDURE — 97110 THERAPEUTIC EXERCISES: CPT | Performed by: PHYSICAL THERAPIST

## 2020-10-21 NOTE — PROGRESS NOTES
Physical Therapy Daily Progress Note      Patient: Salud Gama   : 1945  Diagnosis/ICD-10 Code:  History of total knee arthroplasty, right [Z96.651]  Referring practitioner: Panda Michel MD  Date of Initial Visit: Type: THERAPY  Noted: 2020  Today's Date: 10/21/2020  Patient seen for 10 sessions         Salud Gama reports: she is doing good today and states she has felt more confident walking. Pt. Reports her MD is pleased with her progress and encourages her to continue to try and achieve greater motion.    Oxford Knee Score: 33/48 = 31% impairment    Objective   See Exercise, Manual, and Modality Logs for complete treatment.   PROM R knee 0-115 deg (with overpressure for both)    Assessment/Plan   Pt. Is doing well with therapy intervention having improved strength and balance as well as improving stability. Pt. Will continue to benefit from improved ROM and endurance with activity.    Patient independent with HEP in 2 weeks - MET with assistance from family  Tolerate 10 min Nustep in 2 weeks - MET  Able to SLS 10 sec in 2 weeks - MET  Improve function as evidenced by Oxford knee score of 20% or less by discharge (31% impairment) - NOT MET  Able to extend R knee to -5 deg by discharge to improve gait pattern - MET  Perform 10 reps of repeated sit to stand without arms in 30 seconds by discharge  - MET    Progress per Plan of Care           Timed:         Manual Therapy:    10     mins  17190;     Therapeutic Exercise:    30     mins  33504;     Neuromuscular Cordell:        mins  70823;    Therapeutic Activity:          mins  53550;     Gait Training:           mins  98082;     Ultrasound:          mins  17197;    Ionto                                   mins   02886  Self Care                            mins   11763  Canalith Repos                   mins  4209    Un-Timed:  Electrical Stimulation:         mins  80813 ( );  Dry Needling          mins self-pay  Traction          mins  08301  Low Eval          Mins  79280  Mod Eval          Mins  32517  High Eval                            Mins  47857    Timed Treatment:   40   mins   Total Treatment:     50   mins    Vee Bedoya PTA  Physical Therapist Assistant License #[id number]

## 2020-10-23 ENCOUNTER — FLU SHOT (OUTPATIENT)
Dept: FAMILY MEDICINE CLINIC | Facility: CLINIC | Age: 75
End: 2020-10-23

## 2020-10-23 DIAGNOSIS — Z23 NEED FOR INFLUENZA VACCINATION: ICD-10-CM

## 2020-10-23 PROCEDURE — G0008 ADMIN INFLUENZA VIRUS VAC: HCPCS | Performed by: FAMILY MEDICINE

## 2020-10-23 PROCEDURE — 90694 VACC AIIV4 NO PRSRV 0.5ML IM: CPT | Performed by: FAMILY MEDICINE

## 2020-10-26 ENCOUNTER — TREATMENT (OUTPATIENT)
Dept: PHYSICAL THERAPY | Facility: CLINIC | Age: 75
End: 2020-10-26

## 2020-10-26 DIAGNOSIS — Z96.651 HISTORY OF TOTAL KNEE ARTHROPLASTY, RIGHT: Primary | ICD-10-CM

## 2020-10-26 DIAGNOSIS — M25.562 CHRONIC PAIN OF BOTH KNEES: ICD-10-CM

## 2020-10-26 DIAGNOSIS — M25.561 CHRONIC PAIN OF BOTH KNEES: ICD-10-CM

## 2020-10-26 DIAGNOSIS — G89.29 CHRONIC PAIN OF BOTH KNEES: ICD-10-CM

## 2020-10-26 PROCEDURE — 97530 THERAPEUTIC ACTIVITIES: CPT | Performed by: PHYSICAL THERAPIST

## 2020-10-26 PROCEDURE — 97110 THERAPEUTIC EXERCISES: CPT | Performed by: PHYSICAL THERAPIST

## 2020-10-26 PROCEDURE — 97140 MANUAL THERAPY 1/> REGIONS: CPT | Performed by: PHYSICAL THERAPIST

## 2020-10-26 RX ORDER — MEMANTINE HYDROCHLORIDE 10 MG/1
10 TABLET ORAL 2 TIMES DAILY
Qty: 60 TABLET | Refills: 11 | Status: SHIPPED | OUTPATIENT
Start: 2020-10-26 | End: 2021-11-30

## 2020-10-26 NOTE — PROGRESS NOTES
Physical Therapy Daily Progress Note    Patient: Salud Gama   : 1945  Diagnosis/ICD-10 Code:  History of total knee arthroplasty, right [Z96.651]  Referring practitioner: Panda Michel MD  Date of Initial Visit: Type: THERAPY  Noted: 2020  Today's Date: 10/26/2020  Patient seen for 11 sessions             Subjective Patient reports MD is pleased with her progress, so much so that he is letting her decide when to have the other knee replaced.  Her only complaint is poor balance.    Objective   See Exercise, Manual, and Modality Logs for complete treatment. Added hip abd with band in sitting to further strengthen B hip and core strength for single leg balance.  SLS at 12 sec B. Able to perform standing march without hand hold.  Able to perform repeated sit to stand without using arms. Still limping but due to L knee pain.  Increased leg press weight to 60# without difficulty.  5 deg extension lag R persists.  Able to ambulate independently without loss of balance.      Assessment/Plan  Patient independent with HEP in 2 weeks - MET with assistance from family  Tolerate 10 min Nustep in 2 weeks - MET  Able to SLS 10 sec in 2 weeks - MET  Improve function as evidenced by Oxford knee score of 20% or less by discharge (31% impairment) - NOT MET (limited by non-operative L knee function)  Able to extend R knee to -5 deg by discharge to improve gait pattern - MET  Perform 10 reps of repeated sit to stand without arms in 30 seconds by discharge  - MET    Progress per Plan of Care up to 12 visits if needed.           Timed:         Manual Therapy:    10     mins  33039;     Therapeutic Exercise:    15     mins  81433;     Neuromuscular Cordell:        mins  69562;    Therapeutic Activity:    15      mins  65127;     Gait Training:           mins  80552;     Ultrasound:          mins  47564;    Ionto                                   mins   86237  Self Care                            mins    80984      Un-Timed:  Electrical Stimulation:         mins  92178 ( );  Dry Needling          mins self-pay  Traction          mins 90218  Low Eval          Mins  59697  Mod Eval          Mins  74446  High Eval                            Mins  70314  Canalith Repos                   mins  66069    Timed Treatment:   40   mins   Total Treatment:     50   mins    Robin A Sprigler, PT  Physical Therapist

## 2020-10-27 ENCOUNTER — READMISSION MANAGEMENT (OUTPATIENT)
Dept: CALL CENTER | Facility: HOSPITAL | Age: 75
End: 2020-10-27

## 2020-10-27 NOTE — OUTREACH NOTE
Total Joint Month 3 Survey      Responses   [facility] patient discharged from?  [place]   Does the patient have one of the following disease processes/diagnoses(primary or secondary)?  Total Joint Replacement   Joint surgery performed?  Hip   Month 3 attempt successful?  No   Unsuccessful attempts  Attempt 1          Marta Diaz LPN

## 2020-10-30 ENCOUNTER — READMISSION MANAGEMENT (OUTPATIENT)
Dept: CALL CENTER | Facility: HOSPITAL | Age: 75
End: 2020-10-30

## 2020-10-30 NOTE — OUTREACH NOTE
Total Joint Month 3 Survey      Responses   [facility] patient discharged from?  [place]   Does the patient have one of the following disease processes/diagnoses(primary or secondary)?  Total Joint Replacement   Joint surgery performed?  Hip   Month 3 attempt successful?  Yes   Call start time  1610   Call end time  1612   Has the patient been back in either the hospital or Emergency Department since discharge?  No   Discharge diagnosis  Right total knee arthro   Is patient permission given to speak with other caregiver?  Yes   Person spoke with today (if not patient) and relationship  daughter Miranda   Is the patient taking all medications as directed (includes completed medication regime)?  Yes   Has the patient kept scheduled appointments due by today?  Yes   Comments  Patient has a f/u at 6 months in January.  patient saw surgeon 2 weeks ago.    Is the patient still receiving Home Health Services?  No   DME comments  Patient uses a cane when out of the house.   Is the patient still attending therapy sessions(either in the home or as an outpatient)?  Yes   Has the patient fallen since discharge?  No   What is the patient's perception of their functional status since discharge?  Improving   Is the patient/caregiver able to teach back the hierarchy of who to call/visit for symptoms/problems? PCP, Specialist, Home health nurse, Urgent Care, ED, 911  Yes   Graduated  Yes   Did the patient feel the follow up calls were helpful during their recovery period?  Yes   Was the number of calls appropriate?  Yes   Wrap up additional comments  Pt's daughter states that she is doing great and is doing out-pt PT.          Randee Steiner RN

## 2020-11-04 ENCOUNTER — TREATMENT (OUTPATIENT)
Dept: PHYSICAL THERAPY | Facility: CLINIC | Age: 75
End: 2020-11-04

## 2020-11-04 DIAGNOSIS — Z96.651 HISTORY OF TOTAL KNEE ARTHROPLASTY, RIGHT: Primary | ICD-10-CM

## 2020-11-04 DIAGNOSIS — M25.562 CHRONIC PAIN OF BOTH KNEES: ICD-10-CM

## 2020-11-04 DIAGNOSIS — M25.561 CHRONIC PAIN OF BOTH KNEES: ICD-10-CM

## 2020-11-04 DIAGNOSIS — G89.29 CHRONIC PAIN OF BOTH KNEES: ICD-10-CM

## 2020-11-04 PROCEDURE — 97110 THERAPEUTIC EXERCISES: CPT | Performed by: PHYSICAL THERAPIST

## 2020-11-04 PROCEDURE — 97530 THERAPEUTIC ACTIVITIES: CPT | Performed by: PHYSICAL THERAPIST

## 2020-11-04 PROCEDURE — 97140 MANUAL THERAPY 1/> REGIONS: CPT | Performed by: PHYSICAL THERAPIST

## 2020-11-04 NOTE — PROGRESS NOTES
Physical Therapy Daily Progress Note      Patient: Salud Gama   : 1945  Diagnosis/ICD-10 Code:  History of total knee arthroplasty, right [Z96.651]  Referring practitioner: Panda Michel MD  Date of Initial Visit: Type: THERAPY  Noted: 2020  Today's Date: 2020  Patient seen for 12 sessions         Salud Gama reports: she is doing good but states her balance is still poor. Pt. Reports her knee has felt much improved however. Pt. Has multiple questions this date regarding continued therapy, pending surgery on her other knee, and when she should have it done.     Objective   See Exercise, Manual, and Modality Logs for complete treatment.     Assessment/Plan   Pt. Was encouraged to listen to her surgeon who has suggested she have the surgery when she feels the need too. Pt. Was educated on the current progress of her knee and was encouraged to continue strengthening and stretch at home with today being her last visit.    D/C at this time per approval of PT.           Timed:         Manual Therapy:    10     mins  09713;     Therapeutic Exercise:    15     mins  73167;     Neuromuscular Cordell:        mins  90246;    Therapeutic Activity:   15       mins  64156;     Gait Training:           mins  80276;     Ultrasound:          mins  09157;    Ionto                                   mins   18460  Self Care                            mins   58773  Canalith Repos                   mins  4209    Un-Timed:  Electrical Stimulation:         mins  86935 ( );  Dry Needling          mins self-pay  Traction          mins 05766  Low Eval          Mins  24419  Mod Eval          Mins  94760  High Eval                            Mins  28811    Timed Treatment:   40   mins   Total Treatment:     50   mins    Vee Bedoya PTA  Physical Therapist Assistant License #[id number]

## 2020-11-16 ENCOUNTER — TREATMENT (OUTPATIENT)
Dept: PHYSICAL THERAPY | Facility: CLINIC | Age: 75
End: 2020-11-16

## 2020-11-16 DIAGNOSIS — R53.1 WEAKNESS: ICD-10-CM

## 2020-11-16 DIAGNOSIS — Z96.651 HISTORY OF TOTAL KNEE ARTHROPLASTY, RIGHT: Primary | ICD-10-CM

## 2020-11-16 DIAGNOSIS — G89.29 CHRONIC PAIN OF BOTH KNEES: ICD-10-CM

## 2020-11-16 DIAGNOSIS — M25.562 CHRONIC PAIN OF BOTH KNEES: ICD-10-CM

## 2020-11-16 DIAGNOSIS — M25.561 CHRONIC PAIN OF BOTH KNEES: ICD-10-CM

## 2020-11-16 PROCEDURE — 97530 THERAPEUTIC ACTIVITIES: CPT | Performed by: PHYSICAL THERAPIST

## 2020-11-16 PROCEDURE — 97110 THERAPEUTIC EXERCISES: CPT | Performed by: PHYSICAL THERAPIST

## 2020-11-16 PROCEDURE — 97164 PT RE-EVAL EST PLAN CARE: CPT | Performed by: PHYSICAL THERAPIST

## 2020-11-16 NOTE — PROGRESS NOTES
Physical Therapy Re-Evaluation and Plan of Care    Patient: Salud Gama   : 1945  Diagnosis/ICD-10 Code:  History of total knee arthroplasty, right [Z96.651]  Referring practitioner: Panda Michel MD  Date of Initial Visit: 2020  Today's Date: 2020  Patient seen for 13 sessions             Subjective Patient returns to PT with orders to continue for weakness after completing her initial plan of care.  She reports her pain is gone but balance still needs to improve.  Her daughter is present for treatment and reports dementia had progressed and they plan to make a video of themselves doing the exercises to give her visual cues to follow.  Her  has not been able to effectively motivate her to do her HEP.  Patient goals are to improve balance and strength, able to go up 2 stairs from garage to house with confidence.  She uses her cane to walk in the community.   has been doing grocery shopping.  Plan to wait to perform L TKA until pain or function necessitates.  She is getting more sedentary.      Objective Oxford knee score indicates 25% impairment with limitations in balance, limp, walking.  Extension lag L = 10 deg, R = 5 deg.  MMT:  4/5 knee flexion and extension B as well as hip flexion.  SLS = 5 sec B.  Discussed importance of compliance with HEP and daughter suggested making a video for patient to follow.  Also encouraged a daily walk outside or the store if weather is bad.        Assessment/Plan   Patient independent with HEP in 2 weeks - MET with assistance from family  Tolerate 10 min Nustep in 2 weeks - MET  Able to SLS 10 sec in 2 weeks - MET  Improve function as evidenced by Oxford knee score of 20% or less by discharge (31% impairment) - NOT MET (limited by non-operative L knee function)  Able to extend R knee to -5 deg by discharge to improve gait pattern - MET  Perform 10 reps of repeated sit to stand without arms in 30 seconds by discharge  - MET    Timed:          Manual Therapy:         mins  29076;     Therapeutic Exercise:    15     mins  15402;     Neuromuscular Cordell:        mins  22942;    Therapeutic Activity:     15     mins  13547;     Gait Training:           mins  94468;     Ultrasound:          mins  46114;    Ionto                                   mins   88039  Self-care  ____ mins 67920    Un-Timed:  Electrical Stimulation:         mins  47483 ( );  Dry Needling          mins self-pay  Traction          mins 37579  Re - Eval     30     Mins  28246  Mod Eval          Mins  02394  High Eval                            Mins  41909  Canal repositioning _____ mins  93890        Timed Treatment:   30   mins   Total Treatment:     60   mins    PT SIGNATURE: Robin A Sprigler, PT   DATE TREATMENT INITIATED: 11/16/2020    90 Day Recertification  Certification Period: 2/14/2021  I certify that the therapy services are furnished while this patient is under my care.  The services outlined above are required by this patient, and will be reviewed every 90 days.     PHYSICIAN: Panda Michel MD _________________________________________________________________________________________________________     DATE: _________________________________________________________________________________________________________________________________    Please sign and return via fax to [phone].  Thank you, [facility] Therapy.

## 2020-11-19 ENCOUNTER — TREATMENT (OUTPATIENT)
Dept: PHYSICAL THERAPY | Facility: CLINIC | Age: 75
End: 2020-11-19

## 2020-11-19 DIAGNOSIS — M25.561 CHRONIC PAIN OF BOTH KNEES: ICD-10-CM

## 2020-11-19 DIAGNOSIS — M25.562 CHRONIC PAIN OF BOTH KNEES: ICD-10-CM

## 2020-11-19 DIAGNOSIS — G89.29 CHRONIC PAIN OF BOTH KNEES: ICD-10-CM

## 2020-11-19 DIAGNOSIS — R53.1 WEAKNESS: ICD-10-CM

## 2020-11-19 DIAGNOSIS — Z96.651 HISTORY OF TOTAL KNEE ARTHROPLASTY, RIGHT: Primary | ICD-10-CM

## 2020-11-19 PROCEDURE — 97530 THERAPEUTIC ACTIVITIES: CPT | Performed by: PHYSICAL THERAPIST

## 2020-11-19 PROCEDURE — 97110 THERAPEUTIC EXERCISES: CPT | Performed by: PHYSICAL THERAPIST

## 2020-11-19 NOTE — PROGRESS NOTES
Physical Therapy Daily Progress Note    Patient: Salud Gama   : 1945  Diagnosis/ICD-10 Code:  History of total knee arthroplasty, right [Z96.651]  Referring practitioner: Panda Michel MD  Date of Initial Visit: Type: THERAPY  Noted: 2020  Today's Date: 2020  Patient seen for 14 sessions             Subjective Patient reports she has been exercising at home and found a good place to walk on her porch, she does 20-25 laps.      Objective   See Exercise, Manual, and Modality Logs for complete treatment. Longer SLS today at 10 sec.  Added ham curl to HEP and progressed weight to 60# on traction.  Able to perform some single leg activity without holding on to bar.  Patient repeats things she has told me in previous appointments.      Assessment/Plan  Patient independent with HEP in 2 weeks - MET with assistance from family  Tolerate 10 min Nustep in 2 weeks - MET  Able to SLS 10 sec in 2 weeks - MET  Improve function as evidenced by Oxford knee score of 20% or less by discharge (31% impairment) - NOT MET (limited by non-operative L knee function)  Able to extend R knee to -5 deg by discharge to improve gait pattern - MET  Perform 10 reps of repeated sit to stand without arms in 30 seconds by discharge  - MET     Progress per Plan of Care up to 24 visits if needed           Timed:         Manual Therapy:         mins  72601;     Therapeutic Exercise:    15     mins  33032;     Neuromuscular Cordell:        mins  50976;    Therapeutic Activity:     30    mins  77797;     Gait Training:           mins  52889;     Ultrasound:          mins  13591;    Ionto                                   mins   09462  Self Care                            mins   99634      Un-Timed:  Electrical Stimulation:         mins  16381 ( );  Dry Needling          mins self-pay  Traction          mins 74555  Low Eval          Mins  56579  Mod Eval          Mins  35452  High Eval                            Mins   84837  [facility]                   mins  09855    Timed Treatment:   45   mins   Total Treatment:     45   mins    Robin A Sprigler, PT  Physical Therapist

## 2020-11-23 ENCOUNTER — TREATMENT (OUTPATIENT)
Dept: PHYSICAL THERAPY | Facility: CLINIC | Age: 75
End: 2020-11-23

## 2020-11-23 DIAGNOSIS — G89.29 CHRONIC PAIN OF BOTH KNEES: ICD-10-CM

## 2020-11-23 DIAGNOSIS — M25.561 CHRONIC PAIN OF BOTH KNEES: ICD-10-CM

## 2020-11-23 DIAGNOSIS — M25.562 CHRONIC PAIN OF BOTH KNEES: ICD-10-CM

## 2020-11-23 DIAGNOSIS — R53.1 WEAKNESS: ICD-10-CM

## 2020-11-23 DIAGNOSIS — Z96.651 HISTORY OF TOTAL KNEE ARTHROPLASTY, RIGHT: Primary | ICD-10-CM

## 2020-11-23 PROCEDURE — 97110 THERAPEUTIC EXERCISES: CPT | Performed by: PHYSICAL THERAPIST

## 2020-11-23 PROCEDURE — 97530 THERAPEUTIC ACTIVITIES: CPT | Performed by: PHYSICAL THERAPIST

## 2020-11-23 NOTE — PROGRESS NOTES
Physical Therapy Daily Progress Note    Patient: Salud Gama   : 1945  Diagnosis/ICD-10 Code:  History of total knee arthroplasty, right [Z96.651]  Referring practitioner: Panda Michel MD  Date of Initial Visit: Type: THERAPY  Noted: 2020  Today's Date: 2020  Patient seen for 15 sessions             Subjective Patient says her  is grumpy because he hasn't been able to golf lately.  Patient is in a pleasant mood and daughter present for treatment.    Objective   See Exercise, Manual, and Modality Logs for complete treatment. Patient carries cane in but doesn't use in clinic. R single leg standing balance improving, up to 15 sec.  L knee is painful with single leg activities.      Assessment/Plan  Patient independent with HEP in 2 weeks - MET with assistance from family  Tolerate 10 min Nustep in 2 weeks - MET  Able to SLS 10 sec in 2 weeks - MET  Improve function as evidenced by Oxford knee score of 20% or less by discharge (31% impairment) - NOT MET (limited by non-operative L knee function)  Able to extend R knee to -5 deg by discharge to improve gait pattern - MET  Perform 10 reps of repeated sit to stand without arms in 30 seconds by discharge  - MET    Progress per Plan of Care up to 24 visits if needed.           Timed:         Manual Therapy:         mins  89589;     Therapeutic Exercise:    15     mins  06611;     Neuromuscular Cordell:        mins  91597;    Therapeutic Activity:     15     mins  10052;     Gait Training:           mins  80727;     Ultrasound:          mins  55669;    Ionto                                   mins   17520  Self Care                            mins   88710      Un-Timed:  Electrical Stimulation:         mins  33116 ( );  Dry Needling          mins self-pay  Traction          mins 60764  Low Eval          Mins  04066  Mod Eval          Mins  55619  High Eval                            Mins  93470  Canalith Repos                   mins   04048    Timed Treatment:   30   mins   Total Treatment:     30   mins    Robin A Sprigler, PT  Physical Therapist

## 2020-11-25 ENCOUNTER — TREATMENT (OUTPATIENT)
Dept: PHYSICAL THERAPY | Facility: CLINIC | Age: 75
End: 2020-11-25

## 2020-11-25 DIAGNOSIS — M25.561 CHRONIC PAIN OF BOTH KNEES: ICD-10-CM

## 2020-11-25 DIAGNOSIS — Z96.651 HISTORY OF TOTAL KNEE ARTHROPLASTY, RIGHT: Primary | ICD-10-CM

## 2020-11-25 DIAGNOSIS — R53.1 WEAKNESS: ICD-10-CM

## 2020-11-25 DIAGNOSIS — G89.29 CHRONIC PAIN OF BOTH KNEES: ICD-10-CM

## 2020-11-25 DIAGNOSIS — M25.562 CHRONIC PAIN OF BOTH KNEES: ICD-10-CM

## 2020-11-25 PROCEDURE — 97110 THERAPEUTIC EXERCISES: CPT | Performed by: PHYSICAL THERAPIST

## 2020-11-25 PROCEDURE — 97530 THERAPEUTIC ACTIVITIES: CPT | Performed by: PHYSICAL THERAPIST

## 2020-11-25 NOTE — PROGRESS NOTES
Physical Therapy Daily Progress Note      Patient: Salud Gama   : 1945  Diagnosis/ICD-10 Code:  History of total knee arthroplasty, right [Z96.651]  Referring practitioner: Panda Michel MD  Date of Initial Visit: Type: THERAPY  Noted: 2020  Today's Date: 2020  Patient seen for 16 sessions         Salud Gama reports: she continues to feel stronger and her family members have continued to remind her to do her exercises.    Objective   See Exercise, Manual, and Modality Logs for complete treatment.     Assessment/Plan   Pt. Tolerates exercise well today and shows improving tolerance to balance. Pt. Shows greater consistency with balance in R LE currently than L LE due to progressing arthritis in L Knee.    Progress per Plan of Care           Timed:            Therapeutic Exercise:    17     mins  13539;     Therapeutic Activity:     15     mins  82597;         Timed Treatment:   32   mins   Total Treatment:     32   mins    Vee Bedoya PTA  Physical Therapist Assistant License #[id number]

## 2020-11-30 ENCOUNTER — TREATMENT (OUTPATIENT)
Dept: PHYSICAL THERAPY | Facility: CLINIC | Age: 75
End: 2020-11-30

## 2020-11-30 DIAGNOSIS — Z96.651 HISTORY OF TOTAL KNEE ARTHROPLASTY, RIGHT: Primary | ICD-10-CM

## 2020-11-30 DIAGNOSIS — R53.1 WEAKNESS: ICD-10-CM

## 2020-11-30 DIAGNOSIS — M25.561 CHRONIC PAIN OF BOTH KNEES: ICD-10-CM

## 2020-11-30 DIAGNOSIS — M25.562 CHRONIC PAIN OF BOTH KNEES: ICD-10-CM

## 2020-11-30 DIAGNOSIS — G89.29 CHRONIC PAIN OF BOTH KNEES: ICD-10-CM

## 2020-11-30 PROCEDURE — 97110 THERAPEUTIC EXERCISES: CPT | Performed by: PHYSICAL THERAPIST

## 2020-11-30 PROCEDURE — 97530 THERAPEUTIC ACTIVITIES: CPT | Performed by: PHYSICAL THERAPIST

## 2020-11-30 RX ORDER — CITALOPRAM 20 MG/1
20 TABLET ORAL DAILY
Qty: 90 TABLET | Refills: 3 | Status: SHIPPED | OUTPATIENT
Start: 2020-11-30 | End: 2021-02-24 | Stop reason: SDUPTHER

## 2020-11-30 NOTE — PROGRESS NOTES
Physical Therapy Daily Progress Note    Patient: Salud Gama   : 1945  Diagnosis/ICD-10 Code:  History of total knee arthroplasty, right [Z96.651]  Referring practitioner: Panda Michel MD  Date of Initial Visit: Type: THERAPY  Noted: 2020  Today's Date: 2020  Patient seen for 17 sessions             Subjective Patient (and daughter) reports she is doing well with HEP.    Objective   See Exercise, Manual, and Modality Logs for complete treatment.       Assessment/Plan  Patient independent with HEP in 2 weeks - MET with assistance from family  Tolerate 10 min Nustep in 2 weeks - MET  Able to SLS 10 sec in 2 weeks - MET  Improve function as evidenced by Oxford knee score of 20% or less by discharge (31% impairment) - NOT MET (limited by non-operative L knee function)  Able to extend R knee to -5 deg by discharge to improve gait pattern - MET  Perform 10 reps of repeated sit to stand without arms in 30 seconds by discharge  - MET    Progress per Plan of Care up to 24 visits if needed           Timed:         Manual Therapy:         mins  80163;     Therapeutic Exercise:    15     mins  28138;     Neuromuscular Cordell:        mins  75428;    Therapeutic Activity:     15     mins  88122;     Gait Training:           mins  76555;     Ultrasound:          mins  18675;    Ionto                                   mins   41970  Self Care                            mins   35631      Un-Timed:  Electrical Stimulation:         mins  82195 ( );  Dry Needling          mins self-pay  Traction          mins 92638  Low Eval          Mins  19441  Mod Eval          Mins  24919  High Eval                            Mins  86508  Canalith Repos                   mins  99881    Timed Treatment:   30   mins   Total Treatment:     30   mins    Robin A Sprigler, PT  Physical Therapist

## 2020-12-03 ENCOUNTER — TREATMENT (OUTPATIENT)
Dept: PHYSICAL THERAPY | Facility: CLINIC | Age: 75
End: 2020-12-03

## 2020-12-03 DIAGNOSIS — M25.562 CHRONIC PAIN OF BOTH KNEES: ICD-10-CM

## 2020-12-03 DIAGNOSIS — Z96.651 HISTORY OF TOTAL KNEE ARTHROPLASTY, RIGHT: Primary | ICD-10-CM

## 2020-12-03 DIAGNOSIS — R53.1 WEAKNESS: ICD-10-CM

## 2020-12-03 DIAGNOSIS — M25.561 CHRONIC PAIN OF BOTH KNEES: ICD-10-CM

## 2020-12-03 DIAGNOSIS — G89.29 CHRONIC PAIN OF BOTH KNEES: ICD-10-CM

## 2020-12-03 PROCEDURE — 97110 THERAPEUTIC EXERCISES: CPT | Performed by: PHYSICAL THERAPIST

## 2020-12-03 PROCEDURE — 97530 THERAPEUTIC ACTIVITIES: CPT | Performed by: PHYSICAL THERAPIST

## 2020-12-03 NOTE — PROGRESS NOTES
Physical Therapy Daily Progress Note    Patient: Salud Gama   : 1945  Diagnosis/ICD-10 Code:  History of total knee arthroplasty, right [Z96.651]  Referring practitioner: Panda Michel MD  Date of Initial Visit: Type: THERAPY  Noted: 2020  Today's Date: 12/3/2020  Patient seen for 18 sessions             Subjective Patient reports she is doing well, feeling good and is in pleasant spirits today.    Objective   See Exercise, Manual, and Modality Logs for complete treatment. L knee pain limits activities with L leg weight bearing.      Assessment/Plan  Patient independent with HEP in 2 weeks - MET with assistance from family  Tolerate 10 min Nustep in 2 weeks - MET  Able to SLS 10 sec in 2 weeks - MET  Improve function as evidenced by Oxford knee score of 20% or less by discharge (31% impairment) - NOT MET (limited by non-operative L knee function)  Able to extend R knee to -5 deg by discharge to improve gait pattern - MET  Perform 10 reps of repeated sit to stand without arms in 30 seconds by discharge  - MET    Progress per Plan of Care up to 24 visits if needed           Timed:         Manual Therapy:         mins  09194;     Therapeutic Exercise:    15     mins  93761;     Neuromuscular Cordell:        mins  16597;    Therapeutic Activity:     15     mins  27297;     Gait Training:           mins  88901;     Ultrasound:          mins  35784;    Ionto                                   mins   07867  Self Care                            mins   70061      Un-Timed:  Electrical Stimulation:         mins  14207 ( );  Dry Needling          mins self-pay  Traction          mins 66099  Low Eval          Mins  43817  Mod Eval          Mins  10022  High Eval                            Mins  85913  Canalith Repos                   mins  43438    Timed Treatment:   30   mins   Total Treatment:     30   mins    Robin A Sprigler, ANY  Physical Therapist

## 2020-12-07 ENCOUNTER — TREATMENT (OUTPATIENT)
Dept: PHYSICAL THERAPY | Facility: CLINIC | Age: 75
End: 2020-12-07

## 2020-12-07 DIAGNOSIS — M25.562 CHRONIC PAIN OF BOTH KNEES: ICD-10-CM

## 2020-12-07 DIAGNOSIS — Z96.651 HISTORY OF TOTAL KNEE ARTHROPLASTY, RIGHT: Primary | ICD-10-CM

## 2020-12-07 DIAGNOSIS — M25.561 CHRONIC PAIN OF BOTH KNEES: ICD-10-CM

## 2020-12-07 DIAGNOSIS — G89.29 CHRONIC PAIN OF BOTH KNEES: ICD-10-CM

## 2020-12-07 DIAGNOSIS — R53.1 WEAKNESS: ICD-10-CM

## 2020-12-07 PROCEDURE — 97530 THERAPEUTIC ACTIVITIES: CPT | Performed by: PHYSICAL THERAPIST

## 2020-12-07 PROCEDURE — 97110 THERAPEUTIC EXERCISES: CPT | Performed by: PHYSICAL THERAPIST

## 2020-12-07 NOTE — PROGRESS NOTES
Physical Therapy Daily Progress Note    Patient: Salud Gama   : 1945  Diagnosis/ICD-10 Code:  History of total knee arthroplasty, right [Z96.651]  Referring practitioner: Panda Michel MD  Date of Initial Visit: Type: THERAPY  Noted: 2020  Today's Date: 2020  Patient seen for 19 sessions             Subjective Daughter present for treatment today.  No new complaints.    Objective   See Exercise, Manual, and Modality Logs for complete treatment.       Assessment/Plan  Patient independent with HEP in 2 weeks - MET with assistance from family  Tolerate 10 min Nustep in 2 weeks - MET  Able to SLS 10 sec in 2 weeks - MET  Improve function as evidenced by Oxford knee score of 20% or less by discharge (31% impairment) - NOT MET (limited by non-operative L knee function)  Able to extend R knee to -5 deg by discharge to improve gait pattern - MET  Perform 10 reps of repeated sit to stand without arms in 30 seconds by discharge  - MET    Progress per Plan of Care  Up to 24 visits if needed         Timed:         Manual Therapy:         mins  00891;     Therapeutic Exercise:    15     mins  60536;     Neuromuscular Cordell:        mins  49103;    Therapeutic Activity:     15     mins  90767;     Gait Training:           mins  94401;     Ultrasound:          mins  01996;    Ionto                                   mins   53072  Self Care                            mins   99345      Un-Timed:  Electrical Stimulation:         mins  61018 ( );  Dry Needling          mins self-pay  Traction          mins 93149  Low Eval          Mins  56299  Mod Eval          Mins  82531  High Eval                            Mins  31454  Canalith Repos                   mins  55776    Timed Treatment:   30   mins   Total Treatment:     30   mins    Robin A Sprigler, PT  Physical Therapist

## 2020-12-09 ENCOUNTER — TREATMENT (OUTPATIENT)
Dept: PHYSICAL THERAPY | Facility: CLINIC | Age: 75
End: 2020-12-09

## 2020-12-09 DIAGNOSIS — M25.561 CHRONIC PAIN OF BOTH KNEES: ICD-10-CM

## 2020-12-09 DIAGNOSIS — M25.562 CHRONIC PAIN OF BOTH KNEES: ICD-10-CM

## 2020-12-09 DIAGNOSIS — R53.1 WEAKNESS: ICD-10-CM

## 2020-12-09 DIAGNOSIS — G89.29 CHRONIC PAIN OF BOTH KNEES: ICD-10-CM

## 2020-12-09 DIAGNOSIS — Z96.651 HISTORY OF TOTAL KNEE ARTHROPLASTY, RIGHT: Primary | ICD-10-CM

## 2020-12-09 PROCEDURE — 97530 THERAPEUTIC ACTIVITIES: CPT | Performed by: PHYSICAL THERAPIST

## 2020-12-09 PROCEDURE — 97110 THERAPEUTIC EXERCISES: CPT | Performed by: PHYSICAL THERAPIST

## 2020-12-09 NOTE — PROGRESS NOTES
Physical Therapy Daily Progress Note      Patient: Salud Gama   : 1945  Diagnosis/ICD-10 Code:  History of total knee arthroplasty, right [Z96.651]  Referring practitioner: Panda Michel MD  Date of Initial Visit: Type: THERAPY  Noted: 2020  Today's Date: 2020  Patient seen for 20 sessions         Salud Gama reports: she continues to have improved pain in her R kne but states the L knee continues to get worse. Pt. States her current intentions are to have the L knee replaced in the spring after the fear of ice has passed. Pt. Reports she has continued improving sleep at night.    Objective   See Exercise, Manual, and Modality Logs for complete treatment.     Assessment/Plan   Pt. continue to tolerate progressed exercise and activity requiring no rest breaks from start to finish of treatment this visit seated HS curls were added to exercise today to improve active flexion. Pt. Balance is improving as single leg stance and stepping strategies are performed good progress is noted this date with less VC's for posture required today.    Progress strengthening /stabilization /functional activity           Timed:           Therapeutic Exercise:    15     mins  75567;     Therapeutic Activity:     25     mins  24144;       Timed Treatment:   40   mins   Total Treatment:     40   mins    Vee Bedoya PTA  Physical Therapist Assistant License #[id number]

## 2020-12-14 ENCOUNTER — TREATMENT (OUTPATIENT)
Dept: PHYSICAL THERAPY | Facility: CLINIC | Age: 75
End: 2020-12-14

## 2020-12-14 DIAGNOSIS — M25.561 CHRONIC PAIN OF BOTH KNEES: ICD-10-CM

## 2020-12-14 DIAGNOSIS — R53.1 WEAKNESS: ICD-10-CM

## 2020-12-14 DIAGNOSIS — M25.562 CHRONIC PAIN OF BOTH KNEES: ICD-10-CM

## 2020-12-14 DIAGNOSIS — Z96.651 HISTORY OF TOTAL KNEE ARTHROPLASTY, RIGHT: Primary | ICD-10-CM

## 2020-12-14 DIAGNOSIS — G89.29 CHRONIC PAIN OF BOTH KNEES: ICD-10-CM

## 2020-12-14 PROCEDURE — 97530 THERAPEUTIC ACTIVITIES: CPT | Performed by: PHYSICAL THERAPIST

## 2020-12-14 PROCEDURE — 97110 THERAPEUTIC EXERCISES: CPT | Performed by: PHYSICAL THERAPIST

## 2020-12-14 NOTE — PROGRESS NOTES
"     Physical Therapy Daily Progress Note    Patient: Salud Gama   : 1945  Diagnosis/ICD-10 Code:  History of total knee arthroplasty, right [Z96.651]  Referring practitioner: Panda Michel MD  Date of Initial Visit: Type: THERAPY  Noted: 2020  Today's Date: 2020  Patient seen for 21 sessions             Subjective Patient reports her non-operative knee is \"giving her fits\" with the change in weather.    Objective   See Exercise, Manual, and Modality Logs for complete treatment.       Assessment/Plan  Patient independent with HEP in 2 weeks - MET with assistance from family  Tolerate 10 min Nustep in 2 weeks - MET  Able to SLS 10 sec in 2 weeks - MET  Improve function as evidenced by Oxford knee score of 20% or less by discharge (31% impairment) - NOT MET (limited by non-operative L knee function)  Able to extend R knee to -5 deg by discharge to improve gait pattern - MET  Perform 10 reps of repeated sit to stand without arms in 30 seconds by discharge  - MET    Progress per Plan of Care up to 24 visits if needed.           Timed:         Manual Therapy:         mins  45290;     Therapeutic Exercise:    15     mins  26062;     Neuromuscular Cordell:        mins  15761;    Therapeutic Activity:     25     mins  02894;     Gait Training:           mins  50573;     Ultrasound:          mins  62485;    Ionto                                   mins   18654  Self Care                            mins   57222      Un-Timed:  Electrical Stimulation:         mins  89084 ( );  Dry Needling          mins self-pay  Traction          mins 22999  Low Eval          Mins  28962  Mod Eval          Mins  44641  High Eval                            Mins  35365  Canalith Repos                   mins  74150    Timed Treatment:   40   mins   Total Treatment:     40   mins    Robin A Sprigler, PT  Physical Therapist    "

## 2020-12-16 ENCOUNTER — TREATMENT (OUTPATIENT)
Dept: PHYSICAL THERAPY | Facility: CLINIC | Age: 75
End: 2020-12-16

## 2020-12-16 DIAGNOSIS — G89.29 CHRONIC PAIN OF BOTH KNEES: ICD-10-CM

## 2020-12-16 DIAGNOSIS — Z96.651 HISTORY OF TOTAL KNEE ARTHROPLASTY, RIGHT: Primary | ICD-10-CM

## 2020-12-16 DIAGNOSIS — R53.1 WEAKNESS: ICD-10-CM

## 2020-12-16 DIAGNOSIS — M25.561 CHRONIC PAIN OF BOTH KNEES: ICD-10-CM

## 2020-12-16 DIAGNOSIS — M25.562 CHRONIC PAIN OF BOTH KNEES: ICD-10-CM

## 2020-12-16 PROCEDURE — 97530 THERAPEUTIC ACTIVITIES: CPT | Performed by: PHYSICAL THERAPIST

## 2020-12-16 PROCEDURE — 97110 THERAPEUTIC EXERCISES: CPT | Performed by: PHYSICAL THERAPIST

## 2020-12-23 ENCOUNTER — TREATMENT (OUTPATIENT)
Dept: PHYSICAL THERAPY | Facility: CLINIC | Age: 75
End: 2020-12-23

## 2020-12-23 DIAGNOSIS — M25.561 CHRONIC PAIN OF BOTH KNEES: ICD-10-CM

## 2020-12-23 DIAGNOSIS — R53.1 WEAKNESS: ICD-10-CM

## 2020-12-23 DIAGNOSIS — M25.562 CHRONIC PAIN OF BOTH KNEES: ICD-10-CM

## 2020-12-23 DIAGNOSIS — Z96.651 HISTORY OF TOTAL KNEE ARTHROPLASTY, RIGHT: Primary | ICD-10-CM

## 2020-12-23 DIAGNOSIS — G89.29 CHRONIC PAIN OF BOTH KNEES: ICD-10-CM

## 2020-12-23 PROCEDURE — 97530 THERAPEUTIC ACTIVITIES: CPT | Performed by: PHYSICAL THERAPIST

## 2020-12-23 PROCEDURE — 97110 THERAPEUTIC EXERCISES: CPT | Performed by: PHYSICAL THERAPIST

## 2020-12-23 NOTE — PROGRESS NOTES
Physical Therapy Daily Progress Note      Patient: Salud Gama   : 1945  Diagnosis/ICD-10 Code:  History of total knee arthroplasty, right [Z96.651]  Referring practitioner: Panda Michel MD  Date of Initial Visit: Type: THERAPY  Noted: 2020  Today's Date: 2020  Patient seen for 23 sessions         Salud Gama reports: she is feeling great at this time and is excited for today to be her last day. Pt. States she has every intention schedule her next knee surgery for March after the freezing and snow is over.     Objective   See Exercise, Manual, and Modality Logs for complete treatment.     Assessment/Plan   Pt. Tolerates final appointment well and has shown improved stability in R knee and has difficulty with L due to degenerative changes however is repsondign well to incorporation of the L knee with exercise for strengthening as well. Pt. Is in understanding that today is her last visit and she will resume therapy when she has her L knee operated on. Pt. Is educated and encouraged to continue HEP adamantly to be as strong as possible before next operation.    Patient independent with HEP in 2 weeks - MET with assistance from family  Tolerate 10 min Nustep in 2 weeks - MET  Able to SLS 10 sec in 2 weeks - MET  Improve function as evidenced by Oxford knee score of 20% or less by discharge (31% impairment) - NOT MET (limited by non-operative L knee function)  Able to extend R knee to -5 deg by discharge to improve gait pattern - MET  Perform 10 reps of repeated sit to stand without arms in 30 seconds by discharge  - MET    Discharge to HEP at this time.           Timed:           Therapeutic Exercise:    15     mins  49763;     Therapeutic Activity:     25     mins  99518;         Timed Treatment:   40   mins   Total Treatment:     40   mins    Vee Bedoya PTA  Physical Therapist Assistant License #[id number]

## 2020-12-28 ENCOUNTER — DOCUMENTATION (OUTPATIENT)
Dept: PHYSICAL THERAPY | Facility: CLINIC | Age: 75
End: 2020-12-28

## 2020-12-28 DIAGNOSIS — M25.561 CHRONIC PAIN OF BOTH KNEES: ICD-10-CM

## 2020-12-28 DIAGNOSIS — Z96.651 HISTORY OF TOTAL KNEE ARTHROPLASTY, RIGHT: Primary | ICD-10-CM

## 2020-12-28 DIAGNOSIS — G89.29 CHRONIC PAIN OF BOTH KNEES: ICD-10-CM

## 2020-12-28 DIAGNOSIS — R53.1 WEAKNESS: ICD-10-CM

## 2020-12-28 DIAGNOSIS — M25.562 CHRONIC PAIN OF BOTH KNEES: ICD-10-CM

## 2020-12-28 NOTE — PROGRESS NOTES
[facility] HOSPITALIST   DISCHARGE SUMMARY      Name:  Bing Tate   Age:  70 y.o.  Sex:  female  :  1946  MRN:  [id number]   Visit Number:  [id number]  Primary Care Physician:  Donald Calero MD  Date of Discharge:  10/24/17  Admission Date:  10/23/2017    Discharge Diagnosis:   1. Urinary tract infection, with gram negative nadia, unable to specify further, with fever on admission  2. Mild dehydration, on admission, resolved  3. Chronic steroid dependency  4. History of hypertension  5. History of CAD with recent cath  6. Sinus arrhythmia, resolved with fluids      History of Present Illness/Hospital Course:  The patient is a 70 yr old  lady with history of CAD, HTN, on chronic steroid, who presented to the ER with fever 102 in the ER. She was started on normal saline, 2 gram iv rocephin. Urinalysis showed too numerous to count WBC. She had generalized weakness and had not been eating or drinking well. She was admitted to hospitalist service. She had some sinus with PACs that resolved after hydrated. Initially prelim chest xray questionable opacities, ruled out with no pneumonia, so cefipime and vancomycin were started and discontinued after first doses. Solumedrol provided here. Her fever resolved completely. She began eating, drinking, ambulating per her normal self. She wanted to go home.  at bedside agreed she was acting normally now. Patient discharged home to continue Ceftin twice daily. Medication risks and benefits discussed. She will resume other home medications. Follow up PCP one week.        Consults:     Consults     No orders found from 2017 to 10/24/2017.          Procedures Performed:none             Vital Signs:Temp:  [97.9 °F (36.6 °C)-102.9 °F (39.4 °C)] 97.9 °F (36.6 °C)  Heart Rate:  [62-95] 62  Resp:  [18-20] 20  BP: (112-172)/(60-98) 120/61     Intake and output:     I/O last 3 completed shifts:  In: 2440 [P.O.:240; I.V.:1000; IV  Discharge Summary  Discharge Summary from Physical Therapy Report        Goals: All Met    Discharge Plan: Continue with current home exercise program as instructed    Comments  Patient plans to have other knee replaced in February/March, plans to continue HEP until then.    Patient independent with HEP in 2 weeks - MET with assistance from family  Tolerate 10 min Nustep in 2 weeks - MET  Able to SLS 10 sec in 2 weeks - MET  Improve function as evidenced by Oxford knee score of 20% or less by discharge (31% impairment) - NOT MET (limited by non-operative L knee function)  Able to extend R knee to -5 deg by discharge to improve gait pattern - MET  Perform 10 reps of repeated sit to stand without arms in 30 seconds by discharge  - MET    Date of Discharge 12/28/2020        Robin A Sprigler, PT  Physical Therapist                       Piggyback:1200]  Out: -   I/O this shift:  In: 50 [IV Piggyback:50]  Out: -          Physical Exam:      General Appearance:    Alert, cooperative, in no acute distress   Head:    Normocephalic, without obvious abnormality, atraumatic   Eyes:            Lids and lashes normal, conjunctivae and sclerae normal, no   icterus, no pallor, corneas clear, PERRLA   Ears:    Ears appear intact with no abnormalities noted   Throat:   No oral lesions, no thrush, oral mucosa moist   Neck:   No adenopathy, supple, trachea midline, no thyromegaly, no     carotid bruit, no JVD   Back:     No kyphosis present, no scoliosis present, no skin lesions,       erythema or scars, no tenderness to percussion or                   palpation,   range of motion normal   Lungs:     Clear to auscultation,respirations regular, even and                   unlabored    Heart:    Regular rhythm and normal rate, normal S1 and S2, no            murmur, no gallop, no rub, no click   Breast Exam:    Deferred   Abdomen:     Normal bowel sounds, no masses, no organomegaly, soft        non-tender, non-distended, no guarding, no rebound                 tenderness   Genitalia:    Deferred   Extremities:   Moves all extremities well, no edema, no cyanosis, no              redness   Pulses:   Pulses palpable and equal bilaterally   Skin:   No bleeding, bruising or rash   Lymph nodes:   No palpable adenopathy   Neurologic:   Cranial nerves 2 - 12 grossly intact, sensation intact, DTR        present and equal bilaterally           Pertinent Lab Results:     Lab Results (all)     Procedure Component Value Units Date/Time    Urine Culture - Urine, Urine, Clean Catch [[id number]] Collected:  10/23/17 2202    Specimen:  Urine from Urine, Clean Catch Updated:  10/23/17 2209    Urinalysis With / Culture If Indicated - Urine, Clean Catch [[id number]]  (Abnormal) Collected:  10/23/17 2202    Specimen:  Urine from Urine, Clean Catch Updated:  10/23/17 2217     Color, UA  Yellow     Appearance, UA Cloudy (A)     pH, UA 7.0     Specific Gravity, UA 1.015     Glucose, UA Negative     Ketones, UA Negative     Bilirubin, UA Negative     Blood, UA Moderate (2+) (A)     Protein,  mg/dL (2+) (A)     Leuk Esterase, UA Moderate (2+) (A)     Nitrite, UA Negative     Urobilinogen, UA 0.2 E.U./dL    Urinalysis, Microscopic Only - Urine, Clean Catch [[id number]]  (Abnormal) Collected:  10/23/17 2202    Specimen:  Urine from Urine, Clean Catch Updated:  10/23/17 2217     RBC, UA 13-20 (A) /HPF      WBC, UA Too Numerous to Count (A) /HPF      Bacteria, UA 1+ (A) /HPF      Squamous Epithelial Cells, UA None Seen /HPF      Hyaline Casts, UA 0-2 /LPF      WBC Clumps, UA Large/3+ /HPF      Methodology Manual Light Microscopy    CBC & Differential [[id number]] Collected:  10/23/17 2248    Specimen:  Blood Updated:  10/23/17 2257    Narrative:       The following orders were created for panel order CBC & Differential.  Procedure                               Abnormality         Status                     ---------                               -----------         ------                     CBC Auto Differential[[id number]]        Abnormal            Final result                 Please view results for these tests on the individual orders.    CBC Auto Differential [[id number]]  (Abnormal) Collected:  10/23/17 2248    Specimen:  Blood Updated:  10/23/17 2257     WBC 8.39 10*3/mm3      RBC 4.10 (L) 10*6/mm3      Hemoglobin 12.5 g/dL      Hematocrit 39.0 %      MCV 95.1 fL      MCH 30.5 pg      MCHC 32.1 g/dL      RDW 13.3 %      RDW-SD 47.0 fl      MPV 10.4 fL      Platelets 173 10*3/mm3      Neutrophil % 64.3 %      Lymphocyte % 24.9 %      Monocyte % 8.3 %      Eosinophil % 1.4 %      Basophil % 0.5 %      Immature Grans % 0.6 %      Neutrophils, Absolute 5.39 10*3/mm3      Lymphocytes, Absolute 2.09 10*3/mm3      Monocytes, Absolute 0.70 10*3/mm3      Eosinophils, Absolute 0.12 10*3/mm3      Basophils,  Absolute 0.04 10*3/mm3      Immature Grans, Absolute 0.05 10*3/mm3      nRBC 0.0 /100 WBC     Basic Metabolic Panel [[id number]] Collected:  10/23/17 2248    Specimen:  Blood Updated:  10/23/17 2307     Glucose 91 mg/dL      BUN 15 mg/dL      Creatinine 0.70 mg/dL      Sodium 141 mmol/L      Potassium 4.3 mmol/L      Chloride 103 mmol/L      CO2 27.0 mmol/L      Calcium 9.2 mg/dL      eGFR Non African Amer 83 mL/min/1.73      BUN/Creatinine Ratio 21.4     Anion Gap 15.3 mmol/L     Narrative:       Abnormal estimated GFR should be followed by more specific studies to confirm end stage chronic renal disease. The equation used for calculation may not be accurate for patients less than 19 years old, greater than 70 years old, patients at extremes of weight, malnutrition, or with acute renal dysfunction.    Influenza Antigen, Rapid - Swab, Nasopharynx [[id number]]  (Normal) Collected:  10/23/17 2250    Specimen:  Swab from Nasopharynx Updated:  10/23/17 2310     Influenza A Ag, EIA Negative     Influenza B Ag, EIA Negative    Lactic Acid, Plasma [[id number]]  (Abnormal) Collected:  10/23/17 2248    Specimen:  Blood Updated:  10/23/17 2347     Lactate 2.1 (C) mmol/L     Lactic Acid, Reflex Timer [[id number]] Collected:  10/23/17 2248    Specimen:  Blood Updated:  10/24/17 0303     Extra Tube Hold for add-ons.      Auto resulted.       Lactic Acid, Reflex [[id number]]  (Normal) Collected:  10/24/17 0320    Specimen:  Blood Updated:  10/24/17 0354     Lactate 0.7 mmol/L           Pertinent Radiology Results:    Imaging Results (all)     Procedure Component Value Units Date/Time    XR Chest 1 View [[id number]] Collected:  10/24/17 0813     Updated:  10/24/17 0816    Narrative:       PROCEDURE: XR CHEST 1 VW-     HISTORY: fever; N39.0-Urinary tract infection, site not specified;  R50.9-Fever, unspecified     COMPARISON: June 17, 2016.     FINDINGS:    The heart is normal in size. The mediastinum is  unremarkable. The lungs are  clear. There is no pneumothorax.  There are  no acute osseous abnormalities. Significant degenerative changes are  noted of the bilateral shoulders.       Impression:       No acute cardiopulmonary process.     Continued followup is recommended.     This report was finalized on 10/24/2017 8:14 AM by Frank Chilel DO.          Condition on Discharge:  Stable        Code status during the hospital stay:        Discharge Disposition:  Home or Self Care    Discharge Medication:     Bing Tate   Home Medication Instructions SANTINO:[id number]    Printed on:10/25/17 1548   Medication Information                      Adalimumab (HUMIRA PEN) 40 MG/0.8ML Pen-injector Kit  Inject 40 mg under the skin Every 14 (Fourteen) Days. Pt took this 7/29/17             brimonidine (ALPHAGAN P) 0.1 % solution ophthalmic solution  Administer 1 drop to the right eye Daily.             carboxymethylcellulose (REFRESH PLUS) 0.5 % solution  Administer 1 drop to both eyes As Needed for Dry Eyes.             cefuroxime (CEFTIN) 500 MG tablet  Take 1 tablet by mouth 2 (Two) Times a Day for 6 days.             clopidogrel (PLAVIX) 75 MG tablet  Take 75 mg by mouth Daily.             fluorometholone (FML) 0.1 % ophthalmic ointment  Administer 1 application to both eyes Daily.             INDAPAMIDE PO  Take  by mouth As Needed.             levothyroxine (SYNTHROID, LEVOTHROID) 100 MCG tablet  Take 100 mcg by mouth Daily.             LOSARTAN POTASSIUM PO  Take 100 mg by mouth Daily.             O2 (OXYGEN)  Inhale 2 L/min every night at bedtime.             omeprazole (priLOSEC) 20 MG capsule  Take 20 mg by mouth 2 (Two) Times a Day.             predniSONE (DELTASONE) 5 MG tablet  Take 5 mg by mouth Daily.             rOPINIRole (REQUIP) 1 MG tablet  Take 1 mg by mouth Every Night. Take 1 hour before bedtime.             verapamil PM (VERELAN PM) 180 MG 24 hr capsule  Take 180 mg by mouth Daily.                 Discharge Diet:     Diet  Instructions     Diet: Cardiac; Thin       Discharge Diet:  Cardiac   Fluid Consistency:  Thin                 Activity at Discharge:     Activity Instructions     Activity as Tolerated                     Follow-up Appointments:    No future appointments.  Additional Instructions for the Follow-ups that You Need to Schedule     Discharge Follow-up with PCP    As directed    Follow Up Details:  next week and recommend repeat urinalysis at that visit   Has the patient’s follow-up appointment been scheduled and documented in the discharge navigator?:  Patient to schedule, documented in the follow-up section                 Test Results Pending at Discharge:     Order Current Status    Urine Culture - Urine, Urine, Clean Catch Preliminary result             Marie Hackett DO  10/25/17  3:48 PM      Medication risks and benefits were discussed in great detail. The patient reported being satisfied with the current treatment plan and the care delivered while hospitalized.     Time:  I spent 20 minutes preparing discharge counseling and teaching.

## 2021-02-02 ENCOUNTER — TELEPHONE (OUTPATIENT)
Dept: FAMILY MEDICINE CLINIC | Facility: CLINIC | Age: 76
End: 2021-02-02

## 2021-02-02 NOTE — TELEPHONE ENCOUNTER
Called and spoke to daughter Miranda Solorzano. They are currently in [place]. Daughter stated she would call and schedule wellness when they return.

## 2021-02-10 PROBLEM — Z00.00 MEDICARE ANNUAL WELLNESS VISIT, SUBSEQUENT: Status: ACTIVE | Noted: 2019-07-25

## 2021-02-24 ENCOUNTER — OFFICE VISIT (OUTPATIENT)
Dept: FAMILY MEDICINE CLINIC | Facility: CLINIC | Age: 76
End: 2021-02-24

## 2021-02-24 VITALS
HEART RATE: 101 BPM | HEIGHT: 62 IN | RESPIRATION RATE: 18 BRPM | SYSTOLIC BLOOD PRESSURE: 108 MMHG | BODY MASS INDEX: 24.22 KG/M2 | OXYGEN SATURATION: 97 % | WEIGHT: 131.6 LBS | TEMPERATURE: 97.5 F | DIASTOLIC BLOOD PRESSURE: 70 MMHG

## 2021-02-24 DIAGNOSIS — M85.89 OSTEOPENIA OF MULTIPLE SITES: ICD-10-CM

## 2021-02-24 DIAGNOSIS — I83.93 ASYMPTOMATIC VARICOSE VEINS OF BOTH LOWER EXTREMITIES: ICD-10-CM

## 2021-02-24 DIAGNOSIS — Z12.12 ENCOUNTER FOR COLORECTAL CANCER SCREENING: ICD-10-CM

## 2021-02-24 DIAGNOSIS — Z12.11 ENCOUNTER FOR COLORECTAL CANCER SCREENING: ICD-10-CM

## 2021-02-24 DIAGNOSIS — Z13.220 SCREENING FOR HYPERLIPIDEMIA: ICD-10-CM

## 2021-02-24 DIAGNOSIS — Z96.651 TOTAL KNEE REPLACEMENT STATUS, RIGHT: ICD-10-CM

## 2021-02-24 DIAGNOSIS — Z12.31 ENCOUNTER FOR SCREENING MAMMOGRAM FOR MALIGNANT NEOPLASM OF BREAST: ICD-10-CM

## 2021-02-24 DIAGNOSIS — Z00.00 MEDICARE ANNUAL WELLNESS VISIT, SUBSEQUENT: Primary | ICD-10-CM

## 2021-02-24 DIAGNOSIS — F41.9 ANXIETY: ICD-10-CM

## 2021-02-24 DIAGNOSIS — Z85.41 HISTORY OF MALIGNANT NEOPLASM OF CERVIX: ICD-10-CM

## 2021-02-24 DIAGNOSIS — F02.80 EARLY ONSET ALZHEIMER'S DEMENTIA WITHOUT BEHAVIORAL DISTURBANCE (HCC): ICD-10-CM

## 2021-02-24 DIAGNOSIS — R35.0 URINARY FREQUENCY: ICD-10-CM

## 2021-02-24 DIAGNOSIS — Z11.59 NEED FOR HEPATITIS C SCREENING TEST: ICD-10-CM

## 2021-02-24 DIAGNOSIS — G30.0 EARLY ONSET ALZHEIMER'S DEMENTIA WITHOUT BEHAVIORAL DISTURBANCE (HCC): ICD-10-CM

## 2021-02-24 DIAGNOSIS — R73.9 HYPERGLYCEMIA: ICD-10-CM

## 2021-02-24 DIAGNOSIS — R92.2 DENSE BREAST: ICD-10-CM

## 2021-02-24 PROBLEM — R92.30 DENSE BREAST: Status: ACTIVE | Noted: 2021-02-24

## 2021-02-24 LAB
BILIRUB BLD-MCNC: NEGATIVE MG/DL
CLARITY, POC: CLEAR
COLOR UR: YELLOW
GLUCOSE UR STRIP-MCNC: NEGATIVE MG/DL
KETONES UR QL: NEGATIVE
LEUKOCYTE EST, POC: ABNORMAL
NITRITE UR-MCNC: POSITIVE MG/ML
PH UR: 5 [PH] (ref 5–8)
PROT UR STRIP-MCNC: ABNORMAL MG/DL
RBC # UR STRIP: ABNORMAL /UL
SP GR UR: 1.02 (ref 1–1.03)
UROBILINOGEN UR QL: NORMAL

## 2021-02-24 PROCEDURE — 99213 OFFICE O/P EST LOW 20 MIN: CPT | Performed by: FAMILY MEDICINE

## 2021-02-24 PROCEDURE — G0439 PPPS, SUBSEQ VISIT: HCPCS | Performed by: FAMILY MEDICINE

## 2021-02-24 PROCEDURE — 81002 URINALYSIS NONAUTO W/O SCOPE: CPT | Performed by: FAMILY MEDICINE

## 2021-02-24 PROCEDURE — 36415 COLL VENOUS BLD VENIPUNCTURE: CPT | Performed by: FAMILY MEDICINE

## 2021-02-24 RX ORDER — CITALOPRAM 20 MG/1
20 TABLET ORAL DAILY
Qty: 90 TABLET | Refills: 3 | Status: SHIPPED | OUTPATIENT
Start: 2021-02-24 | End: 2022-03-31

## 2021-02-24 NOTE — PROGRESS NOTES
The ABCs of the Annual Wellness Visit  Subsequent Medicare Wellness Visit    Chief Complaint   Patient presents with   • Medicare Wellness-subsequent       Subjective   History of Present Illness:  Salud Gama is a 76 y.o. female who presents for a Subsequent Medicare Wellness Visit. The patient is here: for coordination of medical care to discuss health maintenance and disease prevention. Overall has: moderate activity with work/home activities, good appetite, feels well with no complaints, good energy level and is sleeping well. Nutrition: balanced diet. Last tetanus shot was 2008.    Salud was seen by Dr. Panda Michel-orthopedic in 07/2020 for right knee pain. She underwent a total right knee replacement on 07/29/2020. She last followed up with him in office in 01/2021. She was also seen by Dr. Seipel- neurology in 07/2020 for a follow up on memory loss. She is scheduled to follow up again in 07/2021      HEALTH RISK ASSESSMENT    Recent Hospitalizations:  Recently treated at the following:  [facility] - right total knee replacement    Current Medical Providers:  Patient Care Team:  Virgie Broderick MD as PCP - General  Seipel, Joseph F, MD as Consulting Physician (Sleep Medicine)  Panda Michel MD as Consulting Physician (Orthopedic Surgery)  Barbara Calderon MD as Consulting Physician (Gastroenterology)  Eugene Ayers MD as Consulting Physician (Urology)    Smoking Status:  Social History     Tobacco Use   Smoking Status Never Smoker   Smokeless Tobacco Never Used       Alcohol Consumption:  Social History     Substance and Sexual Activity   Alcohol Use No   • Frequency: Never       Depression Screen:   PHQ-2/PHQ-9 Depression Screening 2/24/2021   Little interest or pleasure in doing things 0   Feeling down, depressed, or hopeless 0   Trouble falling or staying asleep, or sleeping too much 0   Feeling tired or having little energy 0   Poor appetite or overeating 0   Feeling bad about  yourself - or that you are a failure or have let yourself or your family down 0   Trouble concentrating on things, such as reading the newspaper or watching television 0   Moving or speaking so slowly that other people could have noticed. Or the opposite - being so fidgety or restless that you have been moving around a lot more than usual 0   Thoughts that you would be better off dead, or of hurting yourself in some way 0   Total Score 0   If you checked off any problems, how difficult have these problems made it for you to do your work, take care of things at home, or get along with other people? Not difficult at all     I spent more than 16 minutes asking patient questions, counseling and documenting in the chart    Fall Risk Screen:  ADRYAN Fall Risk Assessment was completed, and patient is at MODERATE risk for falls. Assessment completed on:2/24/2021    Health Habits and Functional and Cognitive Screening:  Functional & Cognitive Status 2/24/2021   Do you have difficulty preparing food and eating? No   Do you have difficulty bathing yourself, getting dressed or grooming yourself? No   Do you have difficulty using the toilet? No   Do you have difficulty moving around from place to place? No   Do you have trouble with steps or getting out of a bed or a chair? No   Do you need help using the phone?  No   Are you deaf or do you have serious difficulty hearing?  No   Do you need help with transportation? Yes   Do you need help shopping? No   Do you need help preparing meals?  No   Do you need help with housework?  No   Do you need help with laundry? No   Do you need help taking your medications? Yes   Do you need help managing money? Yes   Do you ever drive or ride in a car without wearing a seat belt? No   Have you felt unusual stress, anger or loneliness in the last month? No   Who do you live with? Spouse   If you need help, do you have trouble finding someone available to you? No   Have you been bothered in the  last four weeks by sexual problems? No   Do you have difficulty concentrating, remembering or making decisions? No       Does the patient have evidence of cognitive impairment? Yes    Asprin use counseling:Does not need ASA (and currently is not on it)    Recent Lab Results:  Lab Results   Component Value Date    GLU 94 02/24/2021    CHLPL 189 02/24/2021    TRIG 150 (H) 02/24/2021    HDL 67 02/24/2021    LDL 96 02/24/2021    VLDL 26 02/24/2021    HGBA1C 5.6 02/24/2021        Age-appropriate Screening Schedule:  Refer to the list below for future screening recommendations based on patient's age, sex and/or medical conditions. Orders for these recommended tests are listed in the plan section. The patient has been provided with a written plan.    Health Maintenance   Topic Date Due   • ZOSTER VACCINE (1 of 2) 02/23/1995   • TDAP/TD VACCINES (2 - Td) 04/08/2018   • DXA SCAN  02/27/2021   • INFLUENZA VACCINE  Completed          The following portions of the patient's history were reviewed and updated as appropriate: allergies, current medications, past family history, past medical history, past social history, past surgical history and problem list.    Outpatient Medications Prior to Visit   Medication Sig Dispense Refill   • Cholecalciferol (VITAMIN D) 25 MCG (1000 UT) tablet Take 1 tablet by mouth Daily.     • memantine (NAMENDA) 10 MG tablet Take 1 tablet by mouth 2 (Two) Times a Day. 60 tablet 11   • vitamin B-12 (CYANOCOBALAMIN) 100 MCG tablet Take 50 mcg by mouth Daily. HOLD PRIOR TO SURG     • citalopram (CeleXA) 20 MG tablet Take 1 tablet by mouth Daily. 90 tablet 3   • HYDROcodone-acetaminophen (NORCO) 7.5-325 MG per tablet Take 1 tablet by mouth Every 6 (Six) Hours As Needed for Moderate Pain .     • traMADol (ULTRAM) 50 MG tablet Take 50 mg by mouth Every 6 (Six) Hours As Needed for Moderate Pain .       No facility-administered medications prior to visit.        Patient Active Problem List   Diagnosis   •  History of malignant neoplasm of cervix   • Osteoarthritis   • Osteopenia of multiple sites   • Varicose veins of lower extremity   • Encounter for screening mammogram for malignant neoplasm of breast   • Encounter for colorectal cancer screening   • Medicare annual wellness visit, subsequent   • Screening for malignant neoplasm of cervix   • Total knee replacement status, right   • Dense breast   • Early onset Alzheimer's dementia without behavioral disturbance (CMS/[facility])       Advanced Care Planning:  ACP discussion was held with the patient during this visit. Patient has an advance directive in EMR which is still valid.     A face-to-face visit was completed today with patient.  Counseling explanation, and discussion of advanced directives was performed.   The last advanced care visit was performed in 2020.  In a near life ending situation, from which she is not expected to recover functionally, and she is not able to speak for herself, she does not want life sustaining measures. We discussed feeding tubes, ventilators and cardiac support as well as dialysis.    I spent less than 16 minutes discussing Advanced Care Planning information and documenting in the chart. He daughter is present.     Review of Systems   Constitutional: Negative for activity change, appetite change, fatigue, fever and unexpected weight change.   HENT: Negative for congestion, hearing loss, rhinorrhea, sinus pain, sore throat, tinnitus, trouble swallowing and voice change.    Eyes: Negative for visual disturbance.   Respiratory: Negative for apnea, cough, shortness of breath and wheezing.    Cardiovascular: Negative for chest pain and palpitations.   Gastrointestinal: Negative for abdominal pain, blood in stool, constipation, diarrhea, nausea and vomiting.   Endocrine: Negative for cold intolerance, heat intolerance, polydipsia and polyuria.   Genitourinary: Negative for difficulty urinating, dysuria, flank pain, frequency and hematuria.  "  Musculoskeletal: Positive for arthralgias (muliple worse in the knees s/p Right TKR  doing well ). Negative for joint swelling and myalgias.   Skin: Negative for rash.   Allergic/Immunologic: Negative for environmental allergies and immunocompromised state.   Neurological: Negative for dizziness, syncope, weakness, numbness and headaches.        Memory loss, stable by daughter's observation   Hematological: Negative for adenopathy. Does not bruise/bleed easily.   Psychiatric/Behavioral: Negative for dysphoric mood and suicidal ideas. The patient is not nervous/anxious.        I have reviewed and confirmed the accuracy of the HPI and ROS as documented by the MA/LPN/RN Virgie Broderick MD    Compared to one year ago, the patient feels her physical health is the same.  Compared to one year ago, the patient feels her mental health is the same.    Reviewed chart for potential of high risk medication in the elderly: yes  Reviewed chart for potential of harmful drug interactions in the elderly:yes    Objective      Vitals:    02/24/21 1227   BP: 108/70   BP Location: Right arm   Patient Position: Sitting   Cuff Size: Adult   Pulse: 101   Resp: 18   Temp: 97.5 °F (36.4 °C)   TempSrc: Temporal   SpO2: 97%   Weight: 59.7 kg (131 lb 9.6 oz)   Height: 156.2 cm (61.5\")       Body mass index is 24.46 kg/m².  Discussed the patient's BMI with her. The BMI is in the acceptable range.    Physical Exam  Vitals signs reviewed.   Constitutional:       General: She is not in acute distress.     Appearance: She is well-developed.   HENT:      Right Ear: External ear normal.      Left Ear: External ear normal.      Mouth/Throat:      Pharynx: No oropharyngeal exudate.   Eyes:      General:         Right eye: No discharge.         Left eye: No discharge.      Extraocular Movements: Extraocular movements intact.      Conjunctiva/sclera: Conjunctivae normal.      Pupils: Pupils are equal, round, and reactive to light.   Neck:      Thyroid: " No thyromegaly.      Vascular: No JVD.   Cardiovascular:      Rate and Rhythm: Normal rate and regular rhythm.      Pulses:           Carotid pulses are 2+ on the right side and 2+ on the left side.       Femoral pulses are 2+ on the right side and 2+ on the left side.       Dorsalis pedis pulses are 2+ on the right side and 2+ on the left side.      Heart sounds: Normal heart sounds. No murmur. No friction rub. No gallop.    Pulmonary:      Effort: Pulmonary effort is normal. No respiratory distress.      Breath sounds: Normal breath sounds. No stridor. No wheezing or rales.   Chest:      Chest wall: No tenderness.   Abdominal:      General: Bowel sounds are normal. There is no distension.      Palpations: Abdomen is soft. There is no mass.      Tenderness: There is no abdominal tenderness. There is no right CVA tenderness, left CVA tenderness, guarding or rebound.      Hernia: No hernia is present.   Musculoskeletal: Normal range of motion.         General: Deformity (knees) present.      Right lower leg: No edema.      Left lower leg: No edema.   Lymphadenopathy:      Cervical: No cervical adenopathy.   Skin:     General: Skin is warm.      Findings: No erythema, lesion or rash.   Neurological:      Mental Status: She is alert and oriented to person, place, and time.      Cranial Nerves: No cranial nerve deficit.      Sensory: No sensory deficit.      Motor: No weakness or abnormal muscle tone.      Coordination: Coordination normal.      Gait: Gait abnormal (arthritic left knee doesn't extend).      Deep Tendon Reflexes: Reflexes normal.   Psychiatric:         Behavior: Behavior normal.         Thought Content: Thought content normal.         Judgment: Judgment normal.         Assessment    Medicare Risks and Personalized Health Plan  CMS Preventative Services Quick Reference  Cardiovascular risk    The above risks/problems have been discussed with the patient.  Pertinent information has been shared with the  patient in the After Visit Summary.  Follow up plans and orders are seen below in the Assessment/Plan Section.    Problem List Items Addressed This Visit        High    Early onset Alzheimer's dementia without behavioral disturbance (CMS/HCC)    Overview     28 of 30  On mental status exam 02.24.2021, stable.  Continue meds and follow          Relevant Medications    citalopram (CeleXA) 20 MG tablet       Unprioritized    History of malignant neoplasm of cervix    Osteopenia of multiple sites    Overview     -1.0 spine, -2.1 FN, -2.4 hip, 02/2019  Wt bearing to  exercise encouraged.  Continue Calcium and add Alendronate         Relevant Orders    DEXA Bone Density Axial    Varicose veins of lower extremity    Overview     Multiple stable continue compression and low dose ASA as needed.          Encounter for screening mammogram for malignant neoplasm of breast    Overview     Last Mammogram 04/21/2020 heterogeneously dense         Encounter for colorectal cancer screening    Overview     Cologuard 05/02/2019 negative Repeat 2022         Medicare annual wellness visit, subsequent - Primary    Overview     Diet exercise breast self exams, fall prevention and general safety discussed. Previous lab reviewed, we will notify her of today's lab.          Relevant Orders    POCT urinalysis dipstick, manual (Completed)    CBC & Differential (Completed)    Comprehensive Metabolic Panel (Completed)    TSH (Completed)    Total knee replacement status, right    Dense breast    Overview     Salud Gama's 5 year risk of having breast cancer is 1.6%. The average woman at age 76 y.o. has a risk of 2.2% of having breast cancer.  Salud Gama's life time risk of having breast cancer up to age 90 is 3.2%. The average woman's chance of having breast cancer up to the age of 90 is 4.4%.    We discussed the risk assessment values with Salud Gama, she understands that she is at (less) than average risk of developing breast  cancer at 5 years and over her life time than the average woman of her age. She does not desire to have genetic testing or further work up at this time.              Other Visit Diagnoses     Anxiety        Relevant Medications    citalopram (CeleXA) 20 MG tablet    Hyperglycemia        Relevant Orders    Hemoglobin A1c (Completed)    Screening for hyperlipidemia        Relevant Orders    Lipid Panel With / Chol / HDL Ratio (Completed)    Need for hepatitis C screening test        Relevant Orders    Hepatitis C Antibody (Completed)    Urinary frequency        Relevant Orders    Urine Culture - Urine, Urine, Clean Catch (Completed)        Follow Up:  Return in about 1 year (around 2/24/2022), or problems..     An After Visit Summary and PPPS were given to the patient.    I wore protective equipment throughout this patient encounter to include mask and eye protection. Hand hygiene was performed before donning protective equipment and after removal when leaving the room.     Site care done- cleaned with alcohol swab, procedure tolerated well, dressing applied. Venipuncture was obtained after 1 time(s). 2 tubes were drawn. Needle gauge used was 22.

## 2021-02-25 LAB
ALBUMIN SERPL-MCNC: 4.1 G/DL (ref 3.7–4.7)
ALBUMIN/GLOB SERPL: 1.6 {RATIO} (ref 1.2–2.2)
ALP SERPL-CCNC: 96 IU/L (ref 39–117)
ALT SERPL-CCNC: 14 IU/L (ref 0–32)
AST SERPL-CCNC: 17 IU/L (ref 0–40)
BASOPHILS # BLD AUTO: 0 X10E3/UL (ref 0–0.2)
BASOPHILS NFR BLD AUTO: 0 %
BILIRUB SERPL-MCNC: 0.4 MG/DL (ref 0–1.2)
BUN SERPL-MCNC: 20 MG/DL (ref 8–27)
BUN/CREAT SERPL: 30 (ref 12–28)
CALCIUM SERPL-MCNC: 8.9 MG/DL (ref 8.7–10.3)
CHLORIDE SERPL-SCNC: 104 MMOL/L (ref 96–106)
CHOLEST SERPL-MCNC: 189 MG/DL (ref 100–199)
CHOLEST/HDLC SERPL: 2.8 RATIO (ref 0–4.4)
CO2 SERPL-SCNC: 23 MMOL/L (ref 20–29)
CREAT SERPL-MCNC: 0.67 MG/DL (ref 0.57–1)
EOSINOPHIL # BLD AUTO: 0.1 X10E3/UL (ref 0–0.4)
EOSINOPHIL NFR BLD AUTO: 1 %
ERYTHROCYTE [DISTWIDTH] IN BLOOD BY AUTOMATED COUNT: 13.1 % (ref 11.7–15.4)
GLOBULIN SER CALC-MCNC: 2.5 G/DL (ref 1.5–4.5)
GLUCOSE SERPL-MCNC: 94 MG/DL (ref 65–99)
HBA1C MFR BLD: 5.6 % (ref 4.8–5.6)
HCT VFR BLD AUTO: 38.6 % (ref 34–46.6)
HCV AB S/CO SERPL IA: <0.1 S/CO RATIO (ref 0–0.9)
HDLC SERPL-MCNC: 67 MG/DL
HGB BLD-MCNC: 12.7 G/DL (ref 11.1–15.9)
IMM GRANULOCYTES # BLD AUTO: 0 X10E3/UL (ref 0–0.1)
IMM GRANULOCYTES NFR BLD AUTO: 0 %
LDLC SERPL CALC-MCNC: 96 MG/DL (ref 0–99)
LYMPHOCYTES # BLD AUTO: 1.4 X10E3/UL (ref 0.7–3.1)
LYMPHOCYTES NFR BLD AUTO: 23 %
MCH RBC QN AUTO: 29.8 PG (ref 26.6–33)
MCHC RBC AUTO-ENTMCNC: 32.9 G/DL (ref 31.5–35.7)
MCV RBC AUTO: 91 FL (ref 79–97)
MONOCYTES # BLD AUTO: 0.4 X10E3/UL (ref 0.1–0.9)
MONOCYTES NFR BLD AUTO: 7 %
NEUTROPHILS # BLD AUTO: 4.1 X10E3/UL (ref 1.4–7)
NEUTROPHILS NFR BLD AUTO: 69 %
PLATELET # BLD AUTO: 149 X10E3/UL (ref 150–450)
POTASSIUM SERPL-SCNC: 4.2 MMOL/L (ref 3.5–5.2)
PROT SERPL-MCNC: 6.6 G/DL (ref 6–8.5)
RBC # BLD AUTO: 4.26 X10E6/UL (ref 3.77–5.28)
SODIUM SERPL-SCNC: 140 MMOL/L (ref 134–144)
TRIGL SERPL-MCNC: 150 MG/DL (ref 0–149)
TSH SERPL DL<=0.005 MIU/L-ACNC: 1.44 UIU/ML (ref 0.45–4.5)
VLDLC SERPL CALC-MCNC: 26 MG/DL (ref 5–40)
WBC # BLD AUTO: 6 X10E3/UL (ref 3.4–10.8)

## 2021-02-27 LAB
BACTERIA UR CULT: ABNORMAL
BACTERIA UR CULT: ABNORMAL
OTHER ANTIBIOTIC SUSC ISLT: ABNORMAL

## 2021-02-28 PROBLEM — Z00.00 MEDICARE ANNUAL WELLNESS VISIT, SUBSEQUENT: Status: RESOLVED | Noted: 2019-07-25 | Resolved: 2021-02-28

## 2021-03-01 ENCOUNTER — OFFICE (OUTPATIENT)
Dept: URBAN - METROPOLITAN AREA CLINIC 64 | Facility: CLINIC | Age: 76
End: 2021-03-01

## 2021-03-01 VITALS
HEIGHT: 62 IN | DIASTOLIC BLOOD PRESSURE: 65 MMHG | SYSTOLIC BLOOD PRESSURE: 117 MMHG | HEART RATE: 70 BPM | WEIGHT: 132 LBS

## 2021-03-01 DIAGNOSIS — K83.9 DISEASE OF BILIARY TRACT, UNSPECIFIED: ICD-10-CM

## 2021-03-01 DIAGNOSIS — Z12.11 ENCOUNTER FOR SCREENING FOR MALIGNANT NEOPLASM OF COLON: ICD-10-CM

## 2021-03-01 DIAGNOSIS — R10.32 LEFT LOWER QUADRANT PAIN: ICD-10-CM

## 2021-03-01 PROCEDURE — 99214 OFFICE O/P EST MOD 30 MIN: CPT | Performed by: INTERNAL MEDICINE

## 2021-03-02 ENCOUNTER — TELEPHONE (OUTPATIENT)
Dept: FAMILY MEDICINE CLINIC | Facility: CLINIC | Age: 76
End: 2021-03-02

## 2021-03-04 ENCOUNTER — HOSPITAL ENCOUNTER (OUTPATIENT)
Dept: BONE DENSITY | Facility: HOSPITAL | Age: 76
Discharge: HOME | End: 2021-03-04
Admitting: FAMILY MEDICINE

## 2021-03-04 DIAGNOSIS — M85.89 OSTEOPENIA OF MULTIPLE SITES: ICD-10-CM

## 2021-03-04 PROCEDURE — 77080 DXA BONE DENSITY AXIAL: CPT

## 2021-03-11 ENCOUNTER — PATIENT MESSAGE (OUTPATIENT)
Dept: FAMILY MEDICINE CLINIC | Facility: CLINIC | Age: 76
End: 2021-03-11

## 2021-03-19 ENCOUNTER — HOSPITAL ENCOUNTER (OUTPATIENT)
Facility: HOSPITAL | Age: 76
Setting detail: OBSERVATION
Discharge: HOME | End: 2021-03-20
Attending: EMERGENCY MEDICINE | Admitting: INTERNAL MEDICINE

## 2021-03-19 ENCOUNTER — APPOINTMENT (OUTPATIENT)
Dept: GENERAL RADIOLOGY | Facility: HOSPITAL | Age: 76
End: 2021-03-19

## 2021-03-19 DIAGNOSIS — R07.9 CHEST PAIN SYNDROME: Primary | ICD-10-CM

## 2021-03-19 LAB
ALBUMIN SERPL-MCNC: 4 G/DL (ref 3.5–5.2)
ALBUMIN/GLOB SERPL: 1.5 G/DL
ALP SERPL-CCNC: 79 U/L (ref 39–117)
ALT SERPL W P-5'-P-CCNC: 13 U/L (ref 1–33)
ANION GAP SERPL CALCULATED.3IONS-SCNC: 12 MMOL/L (ref 5–15)
AST SERPL-CCNC: 17 U/L (ref 1–32)
BASOPHILS # BLD AUTO: 0 10*3/MM3 (ref 0–0.2)
BASOPHILS NFR BLD AUTO: 0.6 % (ref 0–1.5)
BILIRUB SERPL-MCNC: 0.4 MG/DL (ref 0–1.2)
BUN SERPL-MCNC: 26 MG/DL (ref 8–23)
BUN/CREAT SERPL: 24.8 (ref 7–25)
CALCIUM SPEC-SCNC: 8.5 MG/DL (ref 8.6–10.5)
CHLORIDE SERPL-SCNC: 106 MMOL/L (ref 98–107)
CO2 SERPL-SCNC: 24 MMOL/L (ref 22–29)
CREAT SERPL-MCNC: 1.05 MG/DL (ref 0.57–1)
DEPRECATED RDW RBC AUTO: 42.9 FL (ref 37–54)
EOSINOPHIL # BLD AUTO: 0.1 10*3/MM3 (ref 0–0.4)
EOSINOPHIL NFR BLD AUTO: 1.4 % (ref 0.3–6.2)
ERYTHROCYTE [DISTWIDTH] IN BLOOD BY AUTOMATED COUNT: 13.7 % (ref 12.3–15.4)
GFR SERPL CREATININE-BSD FRML MDRD: 51 ML/MIN/1.73
GLOBULIN UR ELPH-MCNC: 2.6 GM/DL
GLUCOSE SERPL-MCNC: 93 MG/DL (ref 65–99)
HCT VFR BLD AUTO: 39 % (ref 34–46.6)
HGB BLD-MCNC: 13.3 G/DL (ref 12–15.9)
HOLD SPECIMEN: NORMAL
HOLD SPECIMEN: NORMAL
LIPASE SERPL-CCNC: 79 U/L (ref 13–60)
LYMPHOCYTES # BLD AUTO: 1.5 10*3/MM3 (ref 0.7–3.1)
LYMPHOCYTES NFR BLD AUTO: 23.3 % (ref 19.6–45.3)
MCH RBC QN AUTO: 30.8 PG (ref 26.6–33)
MCHC RBC AUTO-ENTMCNC: 34 G/DL (ref 31.5–35.7)
MCV RBC AUTO: 90.6 FL (ref 79–97)
MONOCYTES # BLD AUTO: 0.4 10*3/MM3 (ref 0.1–0.9)
MONOCYTES NFR BLD AUTO: 6.4 % (ref 5–12)
NEUTROPHILS NFR BLD AUTO: 4.4 10*3/MM3 (ref 1.7–7)
NEUTROPHILS NFR BLD AUTO: 68.3 % (ref 42.7–76)
NRBC BLD AUTO-RTO: 0.1 /100 WBC (ref 0–0.2)
NT-PROBNP SERPL-MCNC: 171.1 PG/ML (ref 0–1800)
PLATELET # BLD AUTO: 146 10*3/MM3 (ref 140–450)
PMV BLD AUTO: 8.3 FL (ref 6–12)
POTASSIUM SERPL-SCNC: 4 MMOL/L (ref 3.5–5.2)
PROT SERPL-MCNC: 6.6 G/DL (ref 6–8.5)
RBC # BLD AUTO: 4.3 10*6/MM3 (ref 3.77–5.28)
SODIUM SERPL-SCNC: 142 MMOL/L (ref 136–145)
TROPONIN T SERPL-MCNC: <0.01 NG/ML (ref 0–0.03)
TROPONIN T SERPL-MCNC: <0.01 NG/ML (ref 0–0.03)
WBC # BLD AUTO: 6.5 10*3/MM3 (ref 3.4–10.8)
WHOLE BLOOD HOLD SPECIMEN: NORMAL
WHOLE BLOOD HOLD SPECIMEN: NORMAL

## 2021-03-19 PROCEDURE — 84484 ASSAY OF TROPONIN QUANT: CPT | Performed by: EMERGENCY MEDICINE

## 2021-03-19 PROCEDURE — G0378 HOSPITAL OBSERVATION PER HR: HCPCS

## 2021-03-19 PROCEDURE — 80053 COMPREHEN METABOLIC PANEL: CPT | Performed by: EMERGENCY MEDICINE

## 2021-03-19 PROCEDURE — 99220 PR INITIAL OBSERVATION CARE/DAY 70 MINUTES: CPT | Performed by: NURSE PRACTITIONER

## 2021-03-19 PROCEDURE — 85025 COMPLETE CBC W/AUTO DIFF WBC: CPT | Performed by: EMERGENCY MEDICINE

## 2021-03-19 PROCEDURE — 84484 ASSAY OF TROPONIN QUANT: CPT | Performed by: NURSE PRACTITIONER

## 2021-03-19 PROCEDURE — 93005 ELECTROCARDIOGRAM TRACING: CPT | Performed by: EMERGENCY MEDICINE

## 2021-03-19 PROCEDURE — 83880 ASSAY OF NATRIURETIC PEPTIDE: CPT | Performed by: EMERGENCY MEDICINE

## 2021-03-19 PROCEDURE — 83690 ASSAY OF LIPASE: CPT | Performed by: EMERGENCY MEDICINE

## 2021-03-19 PROCEDURE — 99284 EMERGENCY DEPT VISIT MOD MDM: CPT

## 2021-03-19 PROCEDURE — 71045 X-RAY EXAM CHEST 1 VIEW: CPT

## 2021-03-19 RX ORDER — MEMANTINE HYDROCHLORIDE 10 MG/1
10 TABLET ORAL 2 TIMES DAILY
Status: DISCONTINUED | OUTPATIENT
Start: 2021-03-19 | End: 2021-03-20 | Stop reason: HOSPADM

## 2021-03-19 RX ORDER — ACETAMINOPHEN 325 MG/1
650 TABLET ORAL EVERY 4 HOURS PRN
Status: DISCONTINUED | OUTPATIENT
Start: 2021-03-19 | End: 2021-03-20 | Stop reason: HOSPADM

## 2021-03-19 RX ORDER — ALUMINA, MAGNESIA, AND SIMETHICONE 2400; 2400; 240 MG/30ML; MG/30ML; MG/30ML
15 SUSPENSION ORAL EVERY 6 HOURS PRN
Status: DISCONTINUED | OUTPATIENT
Start: 2021-03-19 | End: 2021-03-20 | Stop reason: HOSPADM

## 2021-03-19 RX ORDER — ACETAMINOPHEN 650 MG/1
650 SUPPOSITORY RECTAL EVERY 4 HOURS PRN
Status: DISCONTINUED | OUTPATIENT
Start: 2021-03-19 | End: 2021-03-20 | Stop reason: HOSPADM

## 2021-03-19 RX ORDER — CHOLECALCIFEROL (VITAMIN D3) 125 MCG
5 CAPSULE ORAL NIGHTLY PRN
Status: DISCONTINUED | OUTPATIENT
Start: 2021-03-19 | End: 2021-03-20 | Stop reason: HOSPADM

## 2021-03-19 RX ORDER — ONDANSETRON 2 MG/ML
4 INJECTION INTRAMUSCULAR; INTRAVENOUS EVERY 6 HOURS PRN
Status: DISCONTINUED | OUTPATIENT
Start: 2021-03-19 | End: 2021-03-20 | Stop reason: HOSPADM

## 2021-03-19 RX ORDER — CITALOPRAM 20 MG/1
20 TABLET ORAL DAILY
Status: DISCONTINUED | OUTPATIENT
Start: 2021-03-20 | End: 2021-03-20 | Stop reason: HOSPADM

## 2021-03-19 RX ORDER — ONDANSETRON 4 MG/1
4 TABLET, FILM COATED ORAL EVERY 6 HOURS PRN
Status: DISCONTINUED | OUTPATIENT
Start: 2021-03-19 | End: 2021-03-20 | Stop reason: HOSPADM

## 2021-03-19 RX ORDER — MELATONIN
1000 DAILY
Status: DISCONTINUED | OUTPATIENT
Start: 2021-03-19 | End: 2021-03-20 | Stop reason: HOSPADM

## 2021-03-19 RX ORDER — SODIUM CHLORIDE 0.9 % (FLUSH) 0.9 %
10 SYRINGE (ML) INJECTION AS NEEDED
Status: DISCONTINUED | OUTPATIENT
Start: 2021-03-19 | End: 2021-03-20 | Stop reason: HOSPADM

## 2021-03-19 RX ORDER — ASPIRIN 325 MG
325 TABLET ORAL ONCE
Status: COMPLETED | OUTPATIENT
Start: 2021-03-19 | End: 2021-03-19

## 2021-03-19 RX ORDER — SODIUM CHLORIDE 0.9 % (FLUSH) 0.9 %
10 SYRINGE (ML) INJECTION EVERY 12 HOURS SCHEDULED
Status: DISCONTINUED | OUTPATIENT
Start: 2021-03-19 | End: 2021-03-20 | Stop reason: HOSPADM

## 2021-03-19 RX ORDER — ACETAMINOPHEN 160 MG/5ML
650 SOLUTION ORAL EVERY 4 HOURS PRN
Status: DISCONTINUED | OUTPATIENT
Start: 2021-03-19 | End: 2021-03-20 | Stop reason: HOSPADM

## 2021-03-19 RX ADMIN — ASPIRIN 325 MG ORAL TABLET 325 MG: 325 PILL ORAL at 13:36

## 2021-03-19 RX ADMIN — Medication 1000 UNITS: at 21:33

## 2021-03-19 RX ADMIN — NITROGLYCERIN 1 INCH: 20 OINTMENT TOPICAL at 13:36

## 2021-03-19 RX ADMIN — Medication 1 TABLET: at 21:33

## 2021-03-19 RX ADMIN — Medication 10 ML: at 21:33

## 2021-03-19 RX ADMIN — MEMANTINE 10 MG: 10 TABLET ORAL at 21:33

## 2021-03-19 NOTE — ED PROVIDER NOTES
Subjective   76-year-old female the onset of chest pain last night.  She states that there was no ascending burning quality to it.  She reports no unusual food or eating habits last night.  She states she has had a recent decrease in exercise tolerance.  She states that she has had some shortness of breath associated with this pain that went up towards her left shoulder.  She reports she had some nausea associated with it.  Reports no diaphoresis.  The patient reports no recent known novel coronavirus exposure and reports no cough congestion or fever.  She has not had pain of this type in the past          Review of Systems   Constitutional: Positive for fatigue. Negative for chills and fever.   Respiratory: Positive for chest tightness and shortness of breath.    Cardiovascular: Positive for chest pain and palpitations. Negative for leg swelling.   Gastrointestinal: Positive for nausea. Negative for diarrhea and vomiting.   Hematological: Does not bruise/bleed easily.   All other systems reviewed and are negative.      Past Medical History:   Diagnosis Date   • Bruise     RIGHT FOOT FROM FALL FEW DAYS AGO/PT DAUGHTER IS GOING TO CALL DR PEDRO OFFICE TOMORROW AND NOTIFY THEM OF RECENT FALL   • Cancer (CMS/HCC)    • Cataract    • Depression    • Falls frequently     R/T KNEE AND BALANCE ISSUE   • History of cervical cancer 1974   • History of hematuria    • History of shingles    • History of total knee arthroplasty, right 07/29/2020   • Internal hemorrhoids    • Memory loss     SHORT TERM   • Osteoarthritis    • Osteopenia of multiple sites    • Primary osteoarthritis of right knee    • Pseudodementia    • Right knee pain    • Urinary retention    • Varicose veins of legs    Patient has not had a stress test within the last 5 years    No Known Allergies    Past Surgical History:   Procedure Laterality Date   • CATARACT EXTRACTION, BILATERAL Bilateral    • CYSTOSCOPY  2013    Dr. Ayers   • ENTEROCELE REPAIR   07/24/2013   • INCONTINENCE SURGERY  07/24/2013   • LIVER BIOPSY  06/2018    wedge   • TOTAL KNEE ARTHROPLASTY Right 7/29/2020    Procedure: RIGHT TOTAL KNEE ARTHROPLASTY;  Surgeon: Panda Michel MD;  Location: [facility] OR;  Service: Orthopedics;  Laterality: Right;   • VAGINAL HYSTERECTOMY  1974    ovaries remain. Cervical cancer       Family History   Problem Relation Age of Onset   • Arthritis Mother    • Diabetes Mother    • Hyperlipidemia Mother    • Heart disease Father    • Diabetes Father    • Colon cancer Father    • Arthritis Sister    • Seizures Sister    • Arthritis Brother    • Diabetes Brother    • Malig Hyperthermia Neg Hx        Social History     Socioeconomic History   • Marital status:      Spouse name: Not on file   • Number of children: Not on file   • Years of education: Not on file   • Highest education level: Not on file   Tobacco Use   • Smoking status: Never Smoker   • Smokeless tobacco: Never Used   Substance and Sexual Activity   • Alcohol use: No   • Drug use: No   • Sexual activity: Defer       Social history patient is however this man has history of lymphoma and hypertension.  She states she has been under a lot of stress    Objective   Physical Exam  Alert Morrow Coma Scale 15   HEENT: Pupils equal and reactive to light. Conjunctivae are not injected. normal tympanic membranes. Oropharynx and nares are normal.   Neck: Supple. Midline trachea. No JVD. No goiter.   Chest: Clear and equal breath sounds bilaterally regular rate and rhythm without murmur or rub.  Nontender chest wall no S3 or 4   Abdomen: Positive bowel sounds nontender nondistended. No rebound or peritoneal signs. No CVA tenderness.   Extremities no clubbing cyanosis or edema motor sensory exam is normal the full range of motion is intact   skin: Warm and dry, no rashes or petechia.   Lymphatic: No regional lymphadenopathy. No calf pain, swelling or Kieran's sign    Procedures           ED Course  ED Course as  of Mar 19 1519   Fri Mar 19, 2021   1421 I examined the patient using the appropriate personal protective equipment.          [TH]      ED Course User Index  [TH] Kristopher Marroquin MD                                 Labs Reviewed   COMPREHENSIVE METABOLIC PANEL - Abnormal; Notable for the following components:       Result Value    BUN 26 (*)     Creatinine 1.05 (*)     Calcium 8.5 (*)     eGFR Non  Amer 51 (*)     All other components within normal limits    Narrative:     GFR Normal >60  Chronic Kidney Disease <60  Kidney Failure <15     LIPASE - Abnormal; Notable for the following components:    Lipase 79 (*)     All other components within normal limits   TROPONIN (IN-HOUSE) - Normal    Narrative:     Troponin T Reference Range:  <= 0.03 ng/mL-   Negative for AMI  >0.03 ng/mL-     Abnormal for myocardial necrosis.  Clinicians would have to utilize clinical acumen, EKG, Troponin and serial changes to determine if it is an Acute Myocardial Infarction or myocardial injury due to an underlying chronic condition.       Results may be falsely decreased if patient taking Biotin.     BNP (IN-HOUSE) - Normal    Narrative:     Among patients with dyspnea, NT-proBNP is highly sensitive for the detection of acute congestive heart failure. In addition NT-proBNP of <300 pg/ml effectively rules out acute congestive heart failure with 99% negative predictive value.    Results may be falsely decreased if patient taking Biotin.     CBC WITH AUTO DIFFERENTIAL - Normal   RAINBOW DRAW    Narrative:     The following orders were created for panel order [place] Draw.  Procedure                               Abnormality         Status                     ---------                               -----------         ------                     Light Blue Top[[id number]]                                   Final result               Green Top (Gel)[[id number]]                                  Final result               Lavender  Top[[id number]]                                     Final result               Gold Top - SST[[id number]]                                   Final result                 Please view results for these tests on the individual orders.   LIGHT BLUE TOP   GREEN TOP   LAVENDER TOP   GOLD TOP - SST   CBC AND DIFFERENTIAL    Narrative:     The following orders were created for panel order CBC & Differential.  Procedure                               Abnormality         Status                     ---------                               -----------         ------                     CBC Auto Differential[[id number]]        Normal              Final result                 Please view results for these tests on the individual orders.     Medications   aspirin tablet 325 mg (325 mg Oral Given 3/19/21 1336)   nitroglycerin (NITROSTAT) ointment 1 inch (1 inch Topical Given 3/19/21 1336)     XR Chest 1 View    Result Date: 3/19/2021  No active disease  Electronically Signed By-Sam Dasilva MD On:3/19/2021 2:42 PM This report was finalized on [id number] by  Sam Dasilva MD.              MDM  Number of Diagnoses or Management Options     Amount and/or Complexity of Data Reviewed  Clinical lab tests: reviewed  Tests in the medicine section of CPT®: reviewed    Risk of Complications, Morbidity, and/or Mortality  Presenting problems: high  Diagnostic procedures: high  Management options: high  General comments: The patient remained asymptomatic while in the emergency department Case discussed the hospitalist practitioner the patient will be admitted for serial troponin and EKG.  The patient will need stress Myoview evaluation.  She was agreeable with this plan of treatment.  She has had a previous history of degenerative joint disease in the knee but thinks that she would be able to attempt a stress test, chemical stress test option was also discussed and the patient was agreeable to this as well        Final diagnoses:   None       ED  Disposition  ED Disposition     None          No follow-up provider specified.       Medication List      No changes were made to your prescriptions during this visit.          Kristopher Marroquin MD  03/23/21 3073

## 2021-03-19 NOTE — H&P
[facility] Medicine Services      Patient Name: Salud Gama  : 1945  MRN: [id number]  Primary Care Physician: Virgie Broderick MD  Date of admission: 3/19/2021    Patient Care Team:  Virgie Broderick MD as PCP - General  SeipeJaya templeton MD as Consulting Physician (Sleep Medicine)  Panda Michel MD as Consulting Physician (Orthopedic Surgery)  Barbara Calderon MD as Consulting Physician (Gastroenterology)  Eugene Ayers MD as Consulting Physician (Urology)          Subjective   History Present Illness     Chief Complaint:   Chief Complaint   Patient presents with   • Chest Pain         Ms. Gama is a 76 y.o. female with PMH of mild dementia, depression, arthritis, and previous cervical cancer who presented to [facility] ED 3/19/21 with complaints of anterior chest pain. The pain started around 9-10pm last night described as an intermittent ache. She has had some right arm soreness but attributed this pain to her second Covid shot at the end of February or from sleeping with her arm elevated. She denied any associated shortness of breath, nausea, dizziness, or diaphoresis with her pain. She denied any exacerbated or alleviating factors. She denied any previous cardiac history. She had a stress test approximately 3 years ago that was negative. Her brother  from a heart attack. She is a nonsmoker.     In the patient had EKG that showed normal sinus rhythm. First troponin was normal. She was given nitro paste and 325mg of aspirin. She was admitted for further workup of chest pain.     Review of Systems   Constitutional: Negative.   HENT: Negative.    Eyes: Negative.    Cardiovascular: Positive for chest pain.   Respiratory: Negative.    Endocrine: Negative.    Hematologic/Lymphatic: Negative.    Skin: Negative.    Musculoskeletal: Negative.    Gastrointestinal: Negative.    Genitourinary: Negative.    Neurological: Negative.    Psychiatric/Behavioral: Negative.     Allergic/Immunologic: Negative.    All other systems reviewed and are negative.        Personal History     Past Medical History:   Past Medical History:   Diagnosis Date   • Bruise     RIGHT FOOT FROM FALL FEW DAYS AGO/PT DAUGHTER IS GOING TO CALL DR MICHEL OFFICE TOMORROW AND NOTIFY THEM OF RECENT FALL   • Cancer (CMS/HCC)    • Cataract    • Depression    • Falls frequently     R/T KNEE AND BALANCE ISSUE   • History of cervical cancer 1974   • History of hematuria    • History of shingles    • History of total knee arthroplasty, right 07/29/2020   • Internal hemorrhoids    • Memory loss     SHORT TERM   • Osteoarthritis    • Osteopenia of multiple sites    • Primary osteoarthritis of right knee    • Pseudodementia    • Right knee pain    • Urinary retention    • Varicose veins of legs        Surgical History:      Past Surgical History:   Procedure Laterality Date   • CATARACT EXTRACTION, BILATERAL Bilateral    • CYSTOSCOPY  2013    Dr. Ayers   • ENTEROCELE REPAIR  07/24/2013   • INCONTINENCE SURGERY  07/24/2013   • LIVER BIOPSY  06/2018    wedge   • TOTAL KNEE ARTHROPLASTY Right 7/29/2020    Procedure: RIGHT TOTAL KNEE ARTHROPLASTY;  Surgeon: Panda Michel MD;  Location: [facility];  Service: Orthopedics;  Laterality: Right;   • VAGINAL HYSTERECTOMY  1974    ovaries remain. Cervical cancer           Family History: family history includes Arthritis in her brother, mother, and sister; Colon cancer in her father; Diabetes in her brother, father, and mother; Heart disease in her father; Hyperlipidemia in her mother; Seizures in her sister.     Social History:  reports that she has never smoked. She has never used smokeless tobacco. She reports that she does not drink alcohol and does not use drugs.      Medications:  Prior to Admission medications    Medication Sig Start Date End Date Taking? Authorizing Provider   Calcium Carb-Cholecalciferol 600-500 MG-UNIT capsule Take 1 capsule by mouth 2 (two) times a  day.   Yes Tai Medina MD   Cholecalciferol (VITAMIN D) 25 MCG (1000 UT) tablet Take 1 tablet by mouth Daily. 3/31/20  Yes Tai Medina MD   citalopram (CeleXA) 20 MG tablet Take 1 tablet by mouth Daily. 2/24/21  Yes Virgie Broderick MD   memantine (NAMENDA) 10 MG tablet Take 1 tablet by mouth 2 (Two) Times a Day. 10/26/20 10/26/21 Yes Seipel, Joseph F, MD   vitamin B-12 (CYANOCOBALAMIN) 100 MCG tablet Take 50 mcg by mouth Daily.   Yes Tai Medina MD       Allergies:  No Known Allergies    Objective   Objective     Vital Signs  Temp:  [98 °F (36.7 °C)] 98 °F (36.7 °C)  Heart Rate:  [58-74] 66  Resp:  [14] 14  BP: ()/(52-70) 101/52  SpO2:  [96 %-100 %] 100 %  on   ;   Device (Oxygen Therapy): room air  Body mass index is 24.14 kg/m².    Physical Exam  Vitals and nursing note reviewed.   Constitutional:       Appearance: Normal appearance. She is normal weight.   HENT:      Head: Normocephalic.   Eyes:      Pupils: Pupils are equal, round, and reactive to light.   Cardiovascular:      Rate and Rhythm: Normal rate and regular rhythm.      Pulses: Normal pulses.      Heart sounds: Normal heart sounds.   Pulmonary:      Effort: Pulmonary effort is normal.      Breath sounds: Normal breath sounds.   Abdominal:      General: Bowel sounds are normal.      Palpations: Abdomen is soft.   Musculoskeletal:         General: Normal range of motion.      Cervical back: Normal range of motion.   Skin:     General: Skin is warm.   Neurological:      Mental Status: She is alert and oriented to person, place, and time.   Psychiatric:         Mood and Affect: Mood normal.         Behavior: Behavior normal.           Results Review:  I have personally reviewed most recent cardiac tracings, lab results and radiology images and interpretations and agree with findings    Results from last 7 days   Lab Units 03/19/21  1300   WBC 10*3/mm3 6.50   HEMOGLOBIN g/dL 13.3   HEMATOCRIT % 39.0   PLATELETS 10*3/mm3  146     Results from last 7 days   Lab Units 03/19/21  1300   SODIUM mmol/L 142   POTASSIUM mmol/L 4.0   CHLORIDE mmol/L 106   CO2 mmol/L 24.0   BUN mg/dL 26*   CREATININE mg/dL 1.05*   GLUCOSE mg/dL 93   CALCIUM mg/dL 8.5*   ALT (SGPT) U/L 13   AST (SGOT) U/L 17   TROPONIN T ng/mL <0.010   PROBNP pg/mL 171.1     Estimated Creatinine Clearance: 38.1 mL/min (A) (by C-G formula based on SCr of 1.05 mg/dL (H)).  Brief Urine Lab Results  (Last result in the past 365 days)      Color   Clarity   Blood   Leuk Est   Nitrite   Protein   CREAT   Urine HCG        02/24/21 1256 Yellow Clear 1+ Small (1+) Positive Trace               Microbiology Results (last 10 days)     ** No results found for the last 240 hours. **          ECG/EMG Results (most recent)     Procedure Component Value Units Date/Time    ECG 12 Lead [[id number]] Collected: 03/19/21 1257     Updated: 03/19/21 1259     QT Interval 395 ms     Narrative:      HEART RATE= 65  bpm  RR Interval= 924  ms  LA Interval= 156  ms  P Horizontal Axis= 7  deg  P Front Axis= 46  deg  QRSD Interval= 76  ms  QT Interval= 395  ms  QRS Axis= 34  deg  T Wave Axis= 46  deg  - NORMAL ECG -  Sinus rhythm  When compared with ECG of 16-Jul-2020 15:11:40,  No significant change  Electronically Signed By:   Date and Time of Study: 2021-03-19 12:57:34                  XR Chest 1 View    Result Date: 3/19/2021  No active disease  Electronically Signed By-Sam Dasilva MD On:3/19/2021 2:42 PM This report was finalized on [id number] by  Sam Dasilva MD.        Estimated Creatinine Clearance: 38.1 mL/min (A) (by C-G formula based on SCr of 1.05 mg/dL (H)).    Assessment/Plan   Assessment/Plan       Active Hospital Problems    Diagnosis  POA   • **Chest pain syndrome [R07.9]  Yes     Priority: High   • Depression [F32.9]  Yes   • Early onset Alzheimer's dementia without behavioral disturbance (CMS/HCC) [G30.0, F02.80]  Yes   • Osteoarthritis [M19.90]  Yes      Resolved Hospital Problems   No  resolved problems to display.     Chest pain  -EKG NSR  -first troponin normal  -previous labs showed TSH 1.44 and lipid panel: total cholesterol 189, triglycerides 150, HDL 67, LDL 96 on 2/24/21  -given aspirin and nitro paste in ER  -currently pain free  -check serial troponins r/o ACS, stress test in am    Dementia  -mild, alert and oriented x3  -cont home namenda    Depression  -cont home celexa         VTE Prophylaxis - SCDs      CODE STATUS:    Code Status and Medical Interventions:   Ordered at: 03/19/21 1707     Level Of Support Discussed With:    Patient     Code Status:    CPR     Medical Interventions (Level of Support Prior to Arrest):    Full       This patient has been examined wearing appropriate Personal Protective Equipment . 03/19/21      I discussed the patient's findings and my recommendations with patient and family.      Signature: Electronically signed by OLEG Muñoz, 03/19/21, 5:56 PM EDT.    [facility] Hospitalist Team

## 2021-03-20 ENCOUNTER — APPOINTMENT (OUTPATIENT)
Dept: NUCLEAR MEDICINE | Facility: HOSPITAL | Age: 76
End: 2021-03-20

## 2021-03-20 ENCOUNTER — READMISSION MANAGEMENT (OUTPATIENT)
Dept: CALL CENTER | Facility: HOSPITAL | Age: 76
End: 2021-03-20

## 2021-03-20 VITALS
RESPIRATION RATE: 16 BRPM | OXYGEN SATURATION: 95 % | HEIGHT: 62 IN | BODY MASS INDEX: 23.77 KG/M2 | TEMPERATURE: 98.3 F | SYSTOLIC BLOOD PRESSURE: 114 MMHG | HEART RATE: 75 BPM | WEIGHT: 129.19 LBS | DIASTOLIC BLOOD PRESSURE: 49 MMHG

## 2021-03-20 LAB
ANION GAP SERPL CALCULATED.3IONS-SCNC: 9 MMOL/L (ref 5–15)
BASOPHILS # BLD AUTO: 0 10*3/MM3 (ref 0–0.2)
BASOPHILS NFR BLD AUTO: 0.5 % (ref 0–1.5)
BUN SERPL-MCNC: 26 MG/DL (ref 8–23)
BUN/CREAT SERPL: 34.7 (ref 7–25)
CALCIUM SPEC-SCNC: 9.3 MG/DL (ref 8.6–10.5)
CHLORIDE SERPL-SCNC: 105 MMOL/L (ref 98–107)
CO2 SERPL-SCNC: 27 MMOL/L (ref 22–29)
CREAT SERPL-MCNC: 0.75 MG/DL (ref 0.57–1)
DEPRECATED RDW RBC AUTO: 43.8 FL (ref 37–54)
EOSINOPHIL # BLD AUTO: 0.1 10*3/MM3 (ref 0–0.4)
EOSINOPHIL NFR BLD AUTO: 2 % (ref 0.3–6.2)
ERYTHROCYTE [DISTWIDTH] IN BLOOD BY AUTOMATED COUNT: 14 % (ref 12.3–15.4)
GFR SERPL CREATININE-BSD FRML MDRD: 75 ML/MIN/1.73
GLUCOSE SERPL-MCNC: 95 MG/DL (ref 65–99)
HCT VFR BLD AUTO: 34.8 % (ref 34–46.6)
HGB BLD-MCNC: 11.7 G/DL (ref 12–15.9)
LYMPHOCYTES # BLD AUTO: 2 10*3/MM3 (ref 0.7–3.1)
LYMPHOCYTES NFR BLD AUTO: 33.4 % (ref 19.6–45.3)
MCH RBC QN AUTO: 30.5 PG (ref 26.6–33)
MCHC RBC AUTO-ENTMCNC: 33.7 G/DL (ref 31.5–35.7)
MCV RBC AUTO: 90.6 FL (ref 79–97)
MONOCYTES # BLD AUTO: 0.4 10*3/MM3 (ref 0.1–0.9)
MONOCYTES NFR BLD AUTO: 7.4 % (ref 5–12)
NEUTROPHILS NFR BLD AUTO: 3.4 10*3/MM3 (ref 1.7–7)
NEUTROPHILS NFR BLD AUTO: 56.7 % (ref 42.7–76)
NRBC BLD AUTO-RTO: 0.1 /100 WBC (ref 0–0.2)
PLATELET # BLD AUTO: 131 10*3/MM3 (ref 140–450)
PMV BLD AUTO: 8.5 FL (ref 6–12)
POTASSIUM SERPL-SCNC: 3.8 MMOL/L (ref 3.5–5.2)
RBC # BLD AUTO: 3.84 10*6/MM3 (ref 3.77–5.28)
SARS-COV-2 RNA PNL SPEC NAA+PROBE: NOT DETECTED
SODIUM SERPL-SCNC: 141 MMOL/L (ref 136–145)
TROPONIN T SERPL-MCNC: <0.01 NG/ML (ref 0–0.03)
WBC # BLD AUTO: 6 10*3/MM3 (ref 3.4–10.8)

## 2021-03-20 PROCEDURE — A9500 TC99M SESTAMIBI: HCPCS | Performed by: INTERNAL MEDICINE

## 2021-03-20 PROCEDURE — 78452 HT MUSCLE IMAGE SPECT MULT: CPT | Performed by: INTERNAL MEDICINE

## 2021-03-20 PROCEDURE — G0378 HOSPITAL OBSERVATION PER HR: HCPCS

## 2021-03-20 PROCEDURE — 93017 CV STRESS TEST TRACING ONLY: CPT

## 2021-03-20 PROCEDURE — 99217 PR OBSERVATION CARE DISCHARGE MANAGEMENT: CPT | Performed by: INTERNAL MEDICINE

## 2021-03-20 PROCEDURE — 25010000002 REGADENOSON 0.4 MG/5ML SOLUTION: Performed by: INTERNAL MEDICINE

## 2021-03-20 PROCEDURE — 93018 CV STRESS TEST I&R ONLY: CPT | Performed by: NURSE PRACTITIONER

## 2021-03-20 PROCEDURE — 85025 COMPLETE CBC W/AUTO DIFF WBC: CPT | Performed by: NURSE PRACTITIONER

## 2021-03-20 PROCEDURE — U0005 INFEC AGEN DETEC AMPLI PROBE: HCPCS | Performed by: INTERNAL MEDICINE

## 2021-03-20 PROCEDURE — U0003 INFECTIOUS AGENT DETECTION BY NUCLEIC ACID (DNA OR RNA); SEVERE ACUTE RESPIRATORY SYNDROME CORONAVIRUS 2 (SARS-COV-2) (CORONAVIRUS DISEASE [COVID-19]), AMPLIFIED PROBE TECHNIQUE, MAKING USE OF HIGH THROUGHPUT TECHNOLOGIES AS DESCRIBED BY CMS-2020-01-R: HCPCS | Performed by: INTERNAL MEDICINE

## 2021-03-20 PROCEDURE — 78452 HT MUSCLE IMAGE SPECT MULT: CPT

## 2021-03-20 PROCEDURE — 80048 BASIC METABOLIC PNL TOTAL CA: CPT | Performed by: NURSE PRACTITIONER

## 2021-03-20 PROCEDURE — 0 TECHNETIUM SESTAMIBI: Performed by: INTERNAL MEDICINE

## 2021-03-20 PROCEDURE — 84484 ASSAY OF TROPONIN QUANT: CPT | Performed by: NURSE PRACTITIONER

## 2021-03-20 RX ADMIN — Medication 10 ML: at 09:08

## 2021-03-20 RX ADMIN — TECHNETIUM TC 99M SESTAMIBI 1 DOSE: 1 INJECTION INTRAVENOUS at 10:58

## 2021-03-20 RX ADMIN — TECHNETIUM TC 99M SESTAMIBI 1 DOSE: 1 INJECTION INTRAVENOUS at 10:04

## 2021-03-20 RX ADMIN — REGADENOSON 0.4 MG: 0.08 INJECTION, SOLUTION INTRAVENOUS at 10:58

## 2021-03-20 RX ADMIN — CITALOPRAM HYDROBROMIDE 20 MG: 20 TABLET ORAL at 14:32

## 2021-03-20 RX ADMIN — MEMANTINE 10 MG: 10 TABLET ORAL at 09:08

## 2021-03-20 NOTE — NURSING NOTE
Called IR 3-4 times about pt's myoview and have been unable to reach any one. Reach out to Dr. Perez and the images are not being able to be seen so pt. Is ok to d/c by him and will f/u out pt if they need to be seen.  Will reach out to Dr. Thornton.

## 2021-03-20 NOTE — DISCHARGE SUMMARY
[facility] Medicine Services  DISCHARGE SUMMARY        Prepared For PCP:  Virgie Broderick MD    Patient Name: Salud Gama  : 1945  MRN: [id number]      Date of Admission:   3/19/2021    Date of Discharge:  3/20/2021    Length of stay:  LOS: 0 days     Hospital Course     Presenting Problem:   Chest pain syndrome [R07.9]      Active Hospital Problems    Diagnosis  POA   • **Chest pain syndrome [R07.9]  Yes   • Depression [F32.9]  Yes   • Early onset Alzheimer's dementia without behavioral disturbance (CMS/HCC) [G30.0, F02.80]  Yes   • Osteoarthritis [M19.90]  Yes      Resolved Hospital Problems   No resolved problems to display.           Hospital Course:  Salud Gama is a 76 y.o. female with PMH of mild dementia, depression, arthritis, and previous cervical cancer who presented to [facility] ED 3/19/21 with complaints of anterior chest pain.  Noted to have atypical chest pain but with risk factors noted.  ACS ruled out, troponins negative.  Patient underwent stress test the next day with exercise EKG portion normal but technical difficulty noted during the nuclear portion imaging.  Cardiology to call the patient tomorrow with final results.  Patient is chest pain-free and wants to return home today.  Okay to discharge per cardiology with outpatient follow-up.  Patient is stable to discharge home today with follow-up with PCP and cardiology as an outpatient.          Recommendation for Outpatient Providers:       Follow with PCP and cardiology      Reasons For Change In Medications and Indications for New Medications:    No changes    Day of Discharge     HPI:   Patient seen examined the day of discharge.  She feels much better, her chest pain has resolved.  Underwent stress test today, EKG portion normal however having technical difficulties pulling out the nuclear portions per cardiology.  Since the EKG portion is normal, cardiology okay with discharge and follow-up as  outpatient.  Discussed with Dr. Perez, who will call the patient tomorrow for the final results as well.    Vital Signs:   Temp:  [97.6 °F (36.4 °C)-98.3 °F (36.8 °C)] 98.3 °F (36.8 °C)  Heart Rate:  [61-75] 75  Resp:  [16-18] 16  BP: (105-114)/(49-70) 114/49     Physical Exam:  General: Awake, alert, elderly female, NAD  Eyes: PERRL, EOMI, conjunctive are clear  Cardiovascular: Regular rate and rhythm, no murmurs  Respiratory: Clear to auscultation bilaterally, no wheezing or rales, unlabored breathing  Abdomen: Soft, nontender, positive bowel sounds, no guarding  Neurologic: A&O, CN grossly intact, moves all extremities spontaneously  Musculoskeletal: Normal range of motion, no deformities  Skin: Warm, dry, intact      Pertinent  and/or Most Recent Results     Results from last 7 days   Lab Units 03/20/21  0027 03/19/21  1300   WBC 10*3/mm3 6.00 6.50   HEMOGLOBIN g/dL 11.7* 13.3   HEMATOCRIT % 34.8 39.0   PLATELETS 10*3/mm3 131* 146   SODIUM mmol/L 141 142   POTASSIUM mmol/L 3.8 4.0   CHLORIDE mmol/L 105 106   CO2 mmol/L 27.0 24.0   BUN mg/dL 26* 26*   CREATININE mg/dL 0.75 1.05*   GLUCOSE mg/dL 95 93   CALCIUM mg/dL 9.3 8.5*     Results from last 7 days   Lab Units 03/19/21  1300   BILIRUBIN mg/dL 0.4   ALK PHOS U/L 79   ALT (SGPT) U/L 13   AST (SGOT) U/L 17           Invalid input(s): TG, LDLCALC, LDLREALC  Results from last 7 days   Lab Units 03/20/21  0027 03/19/21  1843 03/19/21  1300   PROBNP pg/mL  --   --  171.1   TROPONIN T ng/mL <0.010 <0.010 <0.010       Brief Urine Lab Results  (Last result in the past 365 days)      Color   Clarity   Blood   Leuk Est   Nitrite   Protein   CREAT   Urine HCG        02/24/21 1256 Yellow Clear 1+ Small (1+) Positive Trace               Microbiology Results Abnormal     Procedure Component Value - Date/Time    COVID-19,CEPHEID,COR/[facility]/PAD IN-HOUSE(OR EMERGENT/ADD-ON),NP SWAB IN TRANSPORT MEDIA 3-4 HR TAT, RT-PCR - Swab, Nasopharynx [[id number]]  (Normal) Collected: 03/20/21  0701    Lab Status: Final result Specimen: Swab from Nasopharynx Updated: 03/20/21 1003     COVID19 Not Detected    Narrative:      Fact sheet for providers: https://www.fda.gov/media/734036/download     Fact sheet for patients: https://www.fda.gov/media/866397/download          XR Chest 1 View    Result Date: 3/19/2021  Impression: No active disease  Electronically Signed By-Sam Dasilva MD On:3/19/2021 2:42 PM This report was finalized on [id number] by  Sam Dasilva MD.    DEXA Bone Density Axial    Result Date: 3/4/2021  Impression: Findings consistent with osteoporosis.  Copies of the computerized summary reports can be obtained from [employer] via the health information department of [facility].  Electronically Signed By-Mariusz Barrientos MD On:3/4/2021 11:28 AM This report was finalized on [id number] by  Mariusz Barrientos MD.                          Test Results Pending at Discharge        Procedures Performed           Consults:   Consults     No orders found for last 30 day(s).            Discharge Details        Discharge Medications      Continue These Medications      Instructions Start Date   Calcium Carb-Cholecalciferol 600-500 MG-UNIT capsule   1 capsule, Oral, 2 times daily      cholecalciferol 25 MCG (1000 UT) tablet  Commonly known as: VITAMIN D3   1 tablet, Oral, Daily      citalopram 20 MG tablet  Commonly known as: CeleXA   20 mg, Oral, Daily      memantine 10 MG tablet  Commonly known as: NAMENDA   10 mg, Oral, 2 Times Daily      vitamin B-12 100 MCG tablet  Commonly known as: CYANOCOBALAMIN   50 mcg, Oral, Daily             No Known Allergies      Discharge Disposition:  Home or Self Care    Diet:  Hospital:  Diet Order   Procedures   • Diet Cardiac; Healthy Heart         Discharge Activity:         CODE STATUS:    Code Status and Medical Interventions:   Ordered at: 03/19/21 9359     Level Of Support Discussed With:    Patient     Code Status:    CPR     Medical Interventions (Level of  Support Prior to Arrest):    Full         Follow-up Appointments  Future Appointments   Date Time Provider Department Center   8/30/2021 10:30 AM Seipel, Joseph F, MD [facility]             Condition on Discharge:      Stable      This patient has been examined wearing appropriate Personal Protective Equipment on 03/20/21      Electronically signed by Janis Thornton DO, 03/20/21, 4:50 PM EDT.      Time: I spent  22  minutes on this discharge activity which included face-to-face encounter with the patient/reviewing the data in the system/coordination of the care with the nursing staff as well as consultants/documentation/entering orders.

## 2021-03-20 NOTE — PLAN OF CARE
Continue to monitor.  Pt has a stress test today and is already up and awake when we entered her room.    Problem: Adult Inpatient Plan of Care  Goal: Plan of Care Review  Outcome: Ongoing, Progressing  Flowsheets (Taken 3/20/2021 0721)  Plan of Care Reviewed With: patient  Goal: Patient-Specific Goal (Individualized)  Outcome: Ongoing, Progressing  Goal: Absence of Hospital-Acquired Illness or Injury  Outcome: Ongoing, Progressing  Intervention: Identify and Manage Fall Risk  Flowsheets (Taken 3/20/2021 0600 by Mane Sanders, PCT)  Safety Promotion/Fall Prevention:   safety round/check completed   room organization consistent   nonskid shoes/slippers when out of bed   lighting adjusted   fall prevention program maintained   clutter free environment maintained   assistive device/personal items within reach  Intervention: Prevent Skin Injury  Flowsheets (Taken 3/20/2021 0600 by Mane Sanders, PCT)  Body Position: position changed independently  Intervention: Prevent and Manage VTE (venous thromboembolism) Risk  Flowsheets (Taken 3/20/2021 0721)  VTE Prevention/Management: (Pt is up ad donal) other (see comments)  Intervention: Prevent Infection  Flowsheets (Taken 3/20/2021 0721)  Infection Prevention:   personal protective equipment utilized   rest/sleep promoted   single patient room provided  Goal: Optimal Comfort and Wellbeing  Outcome: Ongoing, Progressing  Intervention: Provide Person-Centered Care  Flowsheets (Taken 3/19/2021 1910 by Eric Rivera, RN)  Trust Relationship/Rapport:   care explained   choices provided   emotional support provided   empathic listening provided   questions answered   questions encouraged   reassurance provided   thoughts/feelings acknowledged  Goal: Readiness for Transition of Care  Outcome: Ongoing, Progressing  Intervention: Mutually Develop Transition Plan  Flowsheets  Taken 3/19/2021 1933 by Eric Rivera, RN  Equipment Currently Used at Home: none  Taken 3/19/2021 1932  by Eric Rivera, RN  Transportation Anticipated: family or friend will provide  Patient/Family Anticipated Services at Transition: home health care  Patient/Family Anticipates Transition to: home with family     Problem: Chest Pain  Goal: Resolution of Chest Pain Symptoms  Outcome: Ongoing, Progressing  Intervention: Manage Acute Chest Pain  Flowsheets (Taken 3/19/2021 1253 by Colleen Ramirez, RN)  Chest Pain Intervention:   cardiac biomarkers drawn   cardiac monitoring continued   12-lead ECG obtained   cardiac monitor placed   Goal Outcome Evaluation:  Plan of Care Reviewed With: patient

## 2021-03-20 NOTE — OUTREACH NOTE
Prep Survey      Responses   [facility] patient discharged from?  [facility]   Is LACE score < 7 ?  Yes   Emergency Room discharge w/ pulse ox?  No   Eligibility  [facility]   Date of Admission  03/19/21   Date of Discharge  03/20/21   Discharge Disposition  Home or Self Care   Discharge diagnosis  Chest pain syndrome    Does the patient have one of the following disease processes/diagnoses(primary or secondary)?  Other   Does the patient have Home health ordered?  No   Is there a DME ordered?  No   Prep survey completed?  Yes          Joslyn Mello RN

## 2021-03-20 NOTE — PLAN OF CARE
Problem: Chest Pain  Goal: Resolution of Chest Pain Symptoms  Outcome: Ongoing, Progressing   Goal Outcome Evaluation:

## 2021-03-20 NOTE — NURSING NOTE
Called lab and spoke with switching Covid scan to a 3-4 hr. Due to stress test today. Lab will be able to have a 1 hr. Turn around and I will look for results.

## 2021-03-21 LAB
BH CV NUCLEAR PRIOR STUDY: 3
BH CV REST NUCLEAR ISOTOPE DOSE: 6.4 MCI
BH CV STRESS BP STAGE 1: NORMAL
BH CV STRESS BP STAGE 2: NORMAL
BH CV STRESS COMMENTS STAGE 1: NORMAL
BH CV STRESS COMMENTS STAGE 2: NORMAL
BH CV STRESS DOSE REGADENOSON STAGE 1: 0.4
BH CV STRESS DURATION MIN STAGE 1: 0
BH CV STRESS DURATION MIN STAGE 2: 4
BH CV STRESS DURATION SEC STAGE 1: 10
BH CV STRESS DURATION SEC STAGE 2: 0
BH CV STRESS HR STAGE 1: 95
BH CV STRESS HR STAGE 2: 87
BH CV STRESS NUCLEAR ISOTOPE DOSE: 19 MCI
BH CV STRESS PROTOCOL 1: NORMAL
BH CV STRESS RECOVERY BP: NORMAL MMHG
BH CV STRESS RECOVERY HR: 87 BPM
BH CV STRESS STAGE 1: 1
BH CV STRESS STAGE 2: 2
LV EF NUC BP: 76 %
MAXIMAL PREDICTED HEART RATE: 144 BPM
PERCENT MAX PREDICTED HR: 65.97 %
QT INTERVAL: 395 MS
STRESS BASELINE BP: NORMAL MMHG
STRESS BASELINE HR: 59 BPM
STRESS PERCENT HR: 78 %
STRESS POST PEAK BP: NORMAL MMHG
STRESS POST PEAK HR: 95 BPM
STRESS TARGET HR: 122 BPM

## 2021-03-22 ENCOUNTER — TRANSITIONAL CARE MANAGEMENT TELEPHONE ENCOUNTER (OUTPATIENT)
Dept: CALL CENTER | Facility: HOSPITAL | Age: 76
End: 2021-03-22

## 2021-03-22 NOTE — PROGRESS NOTES
Discharge Planning Assessment   [facility]     Patient Name: Salud Gama  MRN: [id number]  Today's Date: 3/22/2021    Admit Date: 3/19/2021          Plan    Final Discharge Disposition Code  01 - home or self-care    Final Note  return home       Carol naegele rn  Case management  Office number [phone]  Cell phone [phone]

## 2021-03-22 NOTE — OUTREACH NOTE
Call Center TCM Note      Responses   [facility] patient discharged from?  [facility]   Does the patient have one of the following disease processes/diagnoses(primary or secondary)?  Other   TCM attempt successful?  Yes   Call start time  1522   Call end time  1526   Discharge diagnosis  Chest pain syndrome    Person spoke with today (if not patient) and relationship  Miranda-daughter    Meds reviewed with patient/caregiver?  Yes   Is the patient having any side effects they believe may be caused by any medication additions or changes?  No   Does the patient have all medications ordered at discharge?  N/A   Is the patient taking all medications as directed (includes completed medication regime)?  Yes   Does the patient have a primary care provider?   Yes   Does the patient have an appointment with their PCP within 7 days of discharge?  Yes   Comments regarding PCP  Hospital d/c f/u appt is on 3/24/21 at 9:15 am    Has the patient kept scheduled appointments due by today?  N/A   Psychosocial issues?  No   Did the patient receive a copy of their discharge instructions?  Yes   Nursing interventions  Reviewed instructions with patient   What is the patient's perception of their health status since discharge?  Improving   Is the patient/caregiver able to teach back signs and symptoms related to disease process for when to call PCP?  Yes   Is the patient/caregiver able to teach back signs and symptoms related to disease process for when to call 911?  Yes   Is the patient/caregiver able to teach back the hierarchy of who to call/visit for symptoms/problems? PCP, Specialist, Home health nurse, Urgent Care, ED, 911  Yes   If the patient is a current smoker, are they able to teach back resources for cessation?  Not a smoker   TCM call completed?  Yes          Kristin Rios RN    3/22/2021, 15:26 EDT

## 2021-03-24 ENCOUNTER — OFFICE VISIT (OUTPATIENT)
Dept: FAMILY MEDICINE CLINIC | Facility: CLINIC | Age: 76
End: 2021-03-24

## 2021-03-24 VITALS
HEIGHT: 62 IN | BODY MASS INDEX: 24.25 KG/M2 | DIASTOLIC BLOOD PRESSURE: 70 MMHG | HEART RATE: 85 BPM | RESPIRATION RATE: 16 BRPM | WEIGHT: 131.8 LBS | OXYGEN SATURATION: 96 % | TEMPERATURE: 98.2 F | SYSTOLIC BLOOD PRESSURE: 128 MMHG

## 2021-03-24 DIAGNOSIS — Z87.898 HISTORY OF CHEST PAIN: Primary | ICD-10-CM

## 2021-03-24 DIAGNOSIS — G30.0 EARLY ONSET ALZHEIMER'S DEMENTIA WITHOUT BEHAVIORAL DISTURBANCE (HCC): ICD-10-CM

## 2021-03-24 DIAGNOSIS — K21.9 GASTROESOPHAGEAL REFLUX DISEASE WITHOUT ESOPHAGITIS: ICD-10-CM

## 2021-03-24 DIAGNOSIS — F02.80 EARLY ONSET ALZHEIMER'S DEMENTIA WITHOUT BEHAVIORAL DISTURBANCE (HCC): ICD-10-CM

## 2021-03-24 PROCEDURE — 99496 TRANSJ CARE MGMT HIGH F2F 7D: CPT | Performed by: FAMILY MEDICINE

## 2021-03-24 PROCEDURE — 1111F DSCHRG MED/CURRENT MED MERGE: CPT | Performed by: FAMILY MEDICINE

## 2021-03-24 RX ORDER — OMEPRAZOLE 20 MG/1
40 CAPSULE, DELAYED RELEASE ORAL DAILY
Qty: 30 CAPSULE | Refills: 12 | Status: SHIPPED | OUTPATIENT
Start: 2021-03-24 | End: 2021-04-28

## 2021-04-28 ENCOUNTER — OFFICE VISIT (OUTPATIENT)
Dept: CARDIOLOGY | Facility: CLINIC | Age: 76
End: 2021-04-28

## 2021-04-28 VITALS
HEIGHT: 62 IN | WEIGHT: 136 LBS | DIASTOLIC BLOOD PRESSURE: 58 MMHG | BODY MASS INDEX: 25.03 KG/M2 | RESPIRATION RATE: 18 BRPM | HEART RATE: 72 BPM | SYSTOLIC BLOOD PRESSURE: 100 MMHG

## 2021-04-28 DIAGNOSIS — R07.9 CHEST PAIN, UNSPECIFIED TYPE: Primary | ICD-10-CM

## 2021-04-28 PROCEDURE — 99212 OFFICE O/P EST SF 10 MIN: CPT | Performed by: INTERNAL MEDICINE

## 2021-04-28 NOTE — PROGRESS NOTES
Mannie Perez MD, PhD  Cardiology clinic note  Subjective:     Encounter Date:04/28/2021      Patient ID: Salud Gama is a 76 y.o. female.    Chief Complaint:  No chief complaint on file.  Follow-up atypical chest pain    HPI:  History of Present Illness  I the pleasure to see this very pleasant 76-year-old female today with presentation to the ER for atypical chest pain ultimately found with no troponin elevation, normal LV systolic function and size, stress test was low risk with normal myocardial perfusion EF greater than 70%.  She has no cardiac risk factors other than age and she has had no recurrence of her chest pain.  We discussed primary prevention goals and she does not presently meet criteria for statin or aspirin and she prefers to be on the minimal amount medicines possible.  Her main problem is knee pain which limits her exertional quality.  Daughter is with her today we had again long discussion about minimalistic approach and she appreciates us follow-up for her mom.    Review of systems negative x14 point review of systems except as mentioned above    Historical data copied forward from previous encounters numerous unchanged      Interpretation Summary       · Left ventricular ejection fraction is hyperdynamic (Calculated EF > 70%). .  · Myocardial perfusion imaging indicates a normal myocardial perfusion study with no evidence of ischemia.  · Impressions are consistent with a low risk study.  · There is no prior study available for comparison.  · Findings consistent with a normal ECG stress test.     Procedure: Supervision of Lexiscan Nuclear Stress Test     Informed consent was obtained. Baseline EKG shows SR with early R wave progression and non-specific T wave abnormalities in V1 and AVL. Patient received 0.4 mg IV lexiscan per protocol and immediately followed by cardiolite injection with nuclear images to follow. EKG during lexiscan showed no ST changes from baseline. Patient tolerated  well. Please correlate with nuclear images for test results.      Stress testing was supervised by Gillian DEJESUS.     Low risk stress, no ECG changes, normal myocardial perfusion       The following portions of the patient's history were reviewed and updated as appropriate: allergies, current medications, past family history, past medical history, past social history, past surgical history and problem list.    Problem List:  Patient Active Problem List   Diagnosis   • History of malignant neoplasm of cervix   • Osteoarthritis   • Osteopenia of multiple sites   • Varicose veins of lower extremity   • Encounter for screening mammogram for malignant neoplasm of breast   • Encounter for colorectal cancer screening   • Medicare annual wellness visit, subsequent   • Screening for malignant neoplasm of cervix   • Total knee replacement status, right   • Dense breast   • Early onset Alzheimer's dementia without behavioral disturbance (CMS/HCC)   • Chest pain syndrome   • Depression   • Gastroesophageal reflux disease without esophagitis       Past Medical History:  Past Medical History:   Diagnosis Date   • Bruise     RIGHT FOOT FROM FALL FEW DAYS AGO/PT DAUGHTER IS GOING TO CALL DR PEDRO OFFICE TOMORROW AND NOTIFY THEM OF RECENT FALL   • Cancer (CMS/HCC)    • Cataract    • Depression    • Falls frequently     R/T KNEE AND BALANCE ISSUE   • History of cervical cancer 1974   • History of hematuria    • History of shingles    • History of total knee arthroplasty, right 07/29/2020   • Internal hemorrhoids    • Memory loss     SHORT TERM   • Osteoarthritis    • Osteopenia of multiple sites    • Primary osteoarthritis of right knee    • Pseudodementia    • Right knee pain    • Urinary retention    • Varicose veins of legs        Past Surgical History:  Past Surgical History:   Procedure Laterality Date   • CATARACT EXTRACTION, BILATERAL Bilateral    • CYSTOSCOPY  2013    Dr. Ayers   • ENTEROCELE REPAIR  07/24/2013   •  "INCONTINENCE SURGERY  07/24/2013   • LIVER BIOPSY  06/2018    wedge   • TOTAL KNEE ARTHROPLASTY Right 7/29/2020    Procedure: RIGHT TOTAL KNEE ARTHROPLASTY;  Surgeon: Panda Michel MD;  Location: [facility];  Service: Orthopedics;  Laterality: Right;   • VAGINAL HYSTERECTOMY  1974    ovaries remain. Cervical cancer       Social History:  Social History     Socioeconomic History   • Marital status:      Spouse name: Not on file   • Number of children: Not on file   • Years of education: Not on file   • Highest education level: Not on file   Tobacco Use   • Smoking status: Never Smoker   • Smokeless tobacco: Never Used   Substance and Sexual Activity   • Alcohol use: No   • Drug use: No   • Sexual activity: Defer       Allergies:  No Known Allergies    Immunizations:  Immunization History   Administered Date(s) Administered   • COVID-19 (PFIZER) 02/07/2021, 02/28/2021   • FLUAD TRI 65YR+ 10/31/2019   • Fluad Quad 65+ 10/23/2020   • Fluzone High Dose =>65 Years ([employer] ONLY) 12/27/2016   • Pneumococcal Polysaccharide (PPSV23) 02/27/2019   • Tdap 04/08/2008       ROS:  ROS       Objective:         /58 (BP Location: Left arm, Patient Position: Sitting)   Pulse 72   Resp 18   Ht 157.5 cm (62\")   Wt 61.7 kg (136 lb)   LMP 01/01/1976   BMI 24.87 kg/m²     Physical Exam  Normal exam today  In-Office Procedure(s):  Procedures    ASCVD RIsk Score::  The 10-year ASCVD risk score (Aicha MERLOS Jr., et al., 2013) is: 11.5%    Values used to calculate the score:      Age: 76 years      Sex: Female      Is Non- : No      Diabetic: No      Tobacco smoker: No      Systolic Blood Pressure: 100 mmHg      Is BP treated: No      HDL Cholesterol: 67 mg/dL      Total Cholesterol: 189 mg/dL    Recent Radiology:  Imaging Results (Most Recent)     None          Lab Review:   Admission on 03/19/2021, Discharged on 03/20/2021   Component Date Value   • QT Interval 03/19/2021 395    • Extra Tube " 03/19/2021 hold for add-on    • Extra Tube 03/19/2021 done    • Extra Tube 03/19/2021 hold for add-on    • Extra Tube 03/19/2021 Hold for add-ons.    • Glucose 03/19/2021 93    • BUN 03/19/2021 26*   • Creatinine 03/19/2021 1.05*   • Sodium 03/19/2021 142    • Potassium 03/19/2021 4.0    • Chloride 03/19/2021 106    • CO2 03/19/2021 24.0    • Calcium 03/19/2021 8.5*   • Total Protein 03/19/2021 6.6    • Albumin 03/19/2021 4.00    • ALT (SGPT) 03/19/2021 13    • AST (SGOT) 03/19/2021 17    • Alkaline Phosphatase 03/19/2021 79    • Total Bilirubin 03/19/2021 0.4    • eGFR Non African Amer 03/19/2021 51*   • Globulin 03/19/2021 2.6    • A/G Ratio 03/19/2021 1.5    • BUN/Creatinine Ratio 03/19/2021 24.8    • Anion Gap 03/19/2021 12.0    • Lipase 03/19/2021 79*   • Troponin T 03/19/2021 <0.010    • proBNP 03/19/2021 171.1    • WBC 03/19/2021 6.50    • RBC 03/19/2021 4.30    • Hemoglobin 03/19/2021 13.3    • Hematocrit 03/19/2021 39.0    • MCV 03/19/2021 90.6    • MCH 03/19/2021 30.8    • MCHC 03/19/2021 34.0    • RDW 03/19/2021 13.7    • RDW-SD 03/19/2021 42.9    • MPV 03/19/2021 8.3    • Platelets 03/19/2021 146    • Neutrophil % 03/19/2021 68.3    • Lymphocyte % 03/19/2021 23.3    • Monocyte % 03/19/2021 6.4    • Eosinophil % 03/19/2021 1.4    • Basophil % 03/19/2021 0.6    • Neutrophils, Absolute 03/19/2021 4.40    • Lymphocytes, Absolute 03/19/2021 1.50    • Monocytes, Absolute 03/19/2021 0.40    • Eosinophils, Absolute 03/19/2021 0.10    • Basophils, Absolute 03/19/2021 0.00    • nRBC 03/19/2021 0.1    • Troponin T 03/19/2021 <0.010    • BH CV STRESS PROTOCOL 1 03/20/2021 Pharmacologic    • Stage 1 03/20/2021 1    • HR Stage 1 03/20/2021 95    • BP Stage 1 03/20/2021 101/49    • Duration Min Stage 1 03/20/2021 0    • Duration Sec Stage 1 03/20/2021 10    • Stress Dose Regadenoson * 03/20/2021 0.4    • Stress Comments Stage 1 03/20/2021 10 sec bolus injection    • Stage 2 03/20/2021 2    • HR Stage 2 03/20/2021 87     • BP Stage 2 03/20/2021 130/73    • Duration Min Stage 2 03/20/2021 4    • Duration Sec Stage 2 03/20/2021 0    • Stress Comments Stage 2 03/20/2021 recovery    • Baseline HR 03/20/2021 59    • Baseline BP 03/20/2021 124/60    • Peak HR 03/20/2021 95    • Percent Max Pred HR 03/20/2021 65.97    • Percent Target HR 03/20/2021 78    • Peak BP 03/20/2021 101/49    • Recovery HR 03/20/2021 87    • Recovery BP 03/20/2021 130/73    • Target HR (85%) 03/20/2021 122    • Max. Pred. HR (100%) 03/20/2021 144    • Nuclear Prior Study 03/20/2021 3    • BH CV REST NUCLEAR ISOTO* 03/20/2021 6.4    • BH CV STRESS NUCLEAR ISO* 03/20/2021 19.0    • Nuc Stress EF 03/20/2021 76    • Troponin T 03/20/2021 <0.010    • Glucose 03/20/2021 95    • BUN 03/20/2021 26*   • Creatinine 03/20/2021 0.75    • Sodium 03/20/2021 141    • Potassium 03/20/2021 3.8    • Chloride 03/20/2021 105    • CO2 03/20/2021 27.0    • Calcium 03/20/2021 9.3    • eGFR Non  Amer 03/20/2021 75    • BUN/Creatinine Ratio 03/20/2021 34.7*   • Anion Gap 03/20/2021 9.0    • WBC 03/20/2021 6.00    • RBC 03/20/2021 3.84    • Hemoglobin 03/20/2021 11.7*   • Hematocrit 03/20/2021 34.8    • MCV 03/20/2021 90.6    • MCH 03/20/2021 30.5    • MCHC 03/20/2021 33.7    • RDW 03/20/2021 14.0    • RDW-SD 03/20/2021 43.8    • MPV 03/20/2021 8.5    • Platelets 03/20/2021 131*   • Neutrophil % 03/20/2021 56.7    • Lymphocyte % 03/20/2021 33.4    • Monocyte % 03/20/2021 7.4    • Eosinophil % 03/20/2021 2.0    • Basophil % 03/20/2021 0.5    • Neutrophils, Absolute 03/20/2021 3.40    • Lymphocytes, Absolute 03/20/2021 2.00    • Monocytes, Absolute 03/20/2021 0.40    • Eosinophils, Absolute 03/20/2021 0.10    • Basophils, Absolute 03/20/2021 0.00    • nRBC 03/20/2021 0.1    • COVID19 03/20/2021 Not Detected    Office Visit on 02/24/2021   Component Date Value   • Color 02/24/2021 Yellow    • Clarity, UA 02/24/2021 Clear    • Glucose, UA 02/24/2021 Negative    • Bilirubin 02/24/2021  Negative    • Ketones, UA 02/24/2021 Negative    • Specific Gravity  02/24/2021 1.020    • Blood, UA 02/24/2021 1+*   • pH, Urine 02/24/2021 5.0    • Protein, POC 02/24/2021 Trace*   • Urobilinogen, UA 02/24/2021 Normal    • Leukocytes 02/24/2021 Small (1+)*   • Nitrite, UA 02/24/2021 Positive*   • WBC 02/24/2021 6.0    • RBC 02/24/2021 4.26    • Hemoglobin 02/24/2021 12.7    • Hematocrit 02/24/2021 38.6    • MCV 02/24/2021 91    • MCH 02/24/2021 29.8    • MCHC 02/24/2021 32.9    • RDW 02/24/2021 13.1    • Platelets 02/24/2021 149*   • Neutrophil Rel % 02/24/2021 69    • Lymphocyte Rel % 02/24/2021 23    • Monocyte Rel % 02/24/2021 7    • Eosinophil Rel % 02/24/2021 1    • Basophil Rel % 02/24/2021 0    • Neutrophils Absolute 02/24/2021 4.1    • Lymphocytes Absolute 02/24/2021 1.4    • Monocytes Absolute 02/24/2021 0.4    • Eosinophils Absolute 02/24/2021 0.1    • Basophils Absolute 02/24/2021 0.0    • Immature Granulocyte Rel* 02/24/2021 0    • Immature Grans Absolute 02/24/2021 0.0    • Glucose 02/24/2021 94    • BUN 02/24/2021 20    • Creatinine 02/24/2021 0.67    • eGFR Non  Am 02/24/2021 86    • eGFR  Am 02/24/2021 99    • BUN/Creatinine Ratio 02/24/2021 30*   • Sodium 02/24/2021 140    • Potassium 02/24/2021 4.2    • Chloride 02/24/2021 104    • Total CO2 02/24/2021 23    • Calcium 02/24/2021 8.9    • Total Protein 02/24/2021 6.6    • Albumin 02/24/2021 4.1    • Globulin 02/24/2021 2.5    • A/G Ratio 02/24/2021 1.6    • Total Bilirubin 02/24/2021 0.4    • Alkaline Phosphatase 02/24/2021 96    • AST (SGOT) 02/24/2021 17    • ALT (SGPT) 02/24/2021 14    • Total Cholesterol 02/24/2021 189    • Triglycerides 02/24/2021 150*   • HDL Cholesterol 02/24/2021 67    • VLDL Cholesterol Jamey 02/24/2021 26    • LDL Chol Calc (NIH) 02/24/2021 96    • Chol/HDL Ratio 02/24/2021 2.8    • TSH 02/24/2021 1.440    • Hep C Virus Ab 02/24/2021 <0.1    • Hemoglobin A1C 02/24/2021 5.6    • Urine Culture 02/24/2021 Final  report*   • Result 1 02/24/2021 Klebsiella pneumoniae*   • Susceptibility Testing 02/24/2021 Comment                 Assessment:         Atypical chest pain  Normal stress test     Plan:      Atypical chest pain  ASCVD risk calculation 11% the patient did not wish to be on statin therapy  Primary care can reevaluate statin or aspirin therapy as outpatient for primary prevention goals    Return to clinic as needed cardiology, primary follow-up      Level of Care:                 Mannie Perez MD  04/28/21  .

## 2021-06-03 ENCOUNTER — TRANSCRIBE ORDERS (OUTPATIENT)
Dept: ADMINISTRATIVE | Facility: HOSPITAL | Age: 76
End: 2021-06-03

## 2021-06-03 DIAGNOSIS — Z12.31 SCREENING MAMMOGRAM FOR HIGH-RISK PATIENT: ICD-10-CM

## 2021-06-03 DIAGNOSIS — Z12.39 BREAST SCREENING: Primary | ICD-10-CM

## 2021-06-10 ENCOUNTER — HOSPITAL ENCOUNTER (OUTPATIENT)
Dept: MAMMOGRAPHY | Facility: HOSPITAL | Age: 76
Discharge: HOME | End: 2021-06-10
Admitting: FAMILY MEDICINE

## 2021-06-10 DIAGNOSIS — Z12.31 SCREENING MAMMOGRAM FOR HIGH-RISK PATIENT: ICD-10-CM

## 2021-06-10 PROCEDURE — 77067 SCR MAMMO BI INCL CAD: CPT

## 2021-06-10 PROCEDURE — 77063 BREAST TOMOSYNTHESIS BI: CPT

## 2021-07-06 ENCOUNTER — PATIENT MESSAGE (OUTPATIENT)
Dept: FAMILY MEDICINE CLINIC | Facility: CLINIC | Age: 76
End: 2021-07-06

## 2021-08-24 NOTE — PROGRESS NOTES
"Chief Complaint  Memory Loss    Subjective          Salud Gama presents to [facility] for Memory Loss  History of Present Illness  Patient is here for yearly f/u on memory loss. Patient and daughter states her memory is a little worst since last visit only short term, long term memory seems to still be good.patient and daughter denies any Hallucinations or behavior incidents. She is currently taking Namenda 10mg bid and seems to be tolerating medication well.    Vit b12 two years ago was 202    Mri brain 2017 showed minimal white matter change, personally reviewed    Maximum   Score Patient's   Score Questions   5 4 \"What is the year?Season?Date?Day of the week?Month?\"   5 3 \"Where are we now: State?County?Town/city?Hospital?Floor?\"   3 3 3 Unrelated objects Number of trials:___   5 5 Count backward from 100 by sevens or spell WORLD backwards   3 3 Name 3 things from above   2 1 Identify 2 objects   1 0 Repeat the phrase: No ifs, ands,or buts.   3 3 Take paper in right hand, fold it in half, and put it on the floor.   1 1 Please read this and do what it says. \"Close your eyes\"   1 1 Make up and write a sentence about anything. Noun and verb   1 1 Copy this picture 10 angles must be present.   30 25 Total MMSE                   ====================previous visit 8/26/20 dr seipel====================  History of Present Illness, yearly f/u  Memory loss, stable  currently taking Namenda 10 mg   stopped the Ryzadyne due to side effects  and taking OTC B-12. Vitamin d   Patient here today with her daughter and feels her memory has declined.   Patient short term is not as good patient has a tendency of not taking her medication in the past due to up setting her stomach     Patient recently moved to [place] went to a smaller house and closer to her daughter.      Patient been under more stress found out her spouse has lymphoma and feels prognosis is stable not on chemo.   Patient " sleeping good s/p total right knee replacement 7/29/2020 currently walks with a cane and having PT at home.      =========7/2019=======================  4 month f/u, patient has mild short term memory problems, and other cognitive problems currently not taking namenda 5 mg bid  Or  Razadyne.  due to UTI and medication bothered her stomach    lab results.  B12 202     Patient onset symptoms occurred gradually no family hx of dementia.   Patient states all symptoms are about the same, patient is not driving did not renew her drivers license. Patient feels like she's doing well still has some confusion.   Assessment/Plan:     Salud was seen today for memory loss.     Diagnoses and all orders for this visit:     Memory loss     Cognitive decline   Pt has been taking Namenda 10 mg,   Increase to Namenda 28xr          Current Outpatient Medications:   •  Cholecalciferol (VITAMIN D) 25 MCG (1000 UT) tablet, Take 1 tablet by mouth Daily., Disp: , Rfl:   •  citalopram (CeleXA) 20 MG tablet, Take 1 tablet by mouth Daily., Disp: 90 tablet, Rfl: 3  •  memantine (NAMENDA) 10 MG tablet, Take 1 tablet by mouth 2 (Two) Times a Day., Disp: 60 tablet, Rfl: 11  •  omeprazole (priLOSEC) 20 MG capsule, , Disp: , Rfl:   •  rivastigmine (Exelon) 4.6 MG/24HR patch, Place 1 patch on the skin as directed by provider Daily., Disp: 30 patch, Rfl: 3  •  vitamin B-12 (CYANOCOBALAMIN) 100 MCG tablet, Take 50 mcg by mouth Daily., Disp: , Rfl:     Review of Systems   Constitutional: Negative for chills and fever.   HENT: Negative for trouble swallowing and voice change.    Eyes: Negative for photophobia.   Respiratory: Negative for cough and shortness of breath.    Cardiovascular: Negative for palpitations.   Gastrointestinal: Negative for constipation and nausea.   Endocrine: Negative for cold intolerance and heat intolerance.   Genitourinary: Negative for decreased urine volume and frequency.   Neurological: Positive for dizziness and headaches.  "  Psychiatric/Behavioral: Positive for confusion. Negative for behavioral problems.          Objective:    Vital Signs:   /72   Pulse 68   Temp 99.5 °F (37.5 °C) (Temporal)   Ht 157.5 cm (62\")   Wt 62.1 kg (137 lb)   BMI 25.06 kg/m²     Physical Exam  Vitals reviewed.   HENT:      Nose: Nose normal.   Eyes:      Pupils: Pupils are equal, round, and reactive to light.   Neurological:      General: No focal deficit present.      Mental Status: She is alert and oriented to person, place, and time.   Psychiatric:         Mood and Affect: Mood normal.         Behavior: Behavior normal.        Result Review :                Neurologic Exam     Mental Status   Oriented to person, place, and time.     Cranial Nerves     CN III, IV, VI   Pupils are equal, round, and reactive to light.        Assessment and Plan    Diagnoses and all orders for this visit:    1. Early onset Alzheimer's dementia without behavioral disturbance (CMS/HCC) (Primary)  -     Vitamin B12; Future  -     Folate; Future  -     Vitamin D 1,25 Dihydroxy; Future  -     Vitamin E; Future    Other orders  -     rivastigmine (Exelon) 4.6 MG/24HR patch; Place 1 patch on the skin as directed by provider Daily.  Dispense: 30 patch; Refill: 3         Continue Namenda, pt had diarrhea with Aricept , will try Exelon patch    will check labs        Follow Up   Return in about 1 year (around 8/30/2022).  Patient was given instructions and counseling regarding her condition or for health maintenance advice. Please see specific information pulled into the AVS if appropriate.     This document has been electronically signed by Joseph Seipel, MD on August 30, 2021 11:06 EDT      "

## 2021-08-30 ENCOUNTER — LAB (OUTPATIENT)
Dept: LAB | Facility: HOSPITAL | Age: 76
End: 2021-08-30

## 2021-08-30 ENCOUNTER — OFFICE VISIT (OUTPATIENT)
Dept: NEUROLOGY | Facility: CLINIC | Age: 76
End: 2021-08-30

## 2021-08-30 VITALS
DIASTOLIC BLOOD PRESSURE: 72 MMHG | HEART RATE: 68 BPM | SYSTOLIC BLOOD PRESSURE: 112 MMHG | BODY MASS INDEX: 25.21 KG/M2 | HEIGHT: 62 IN | WEIGHT: 137 LBS | TEMPERATURE: 99.5 F

## 2021-08-30 DIAGNOSIS — F02.80 EARLY ONSET ALZHEIMER'S DEMENTIA WITHOUT BEHAVIORAL DISTURBANCE (HCC): Primary | ICD-10-CM

## 2021-08-30 DIAGNOSIS — G30.0 EARLY ONSET ALZHEIMER'S DEMENTIA WITHOUT BEHAVIORAL DISTURBANCE (HCC): ICD-10-CM

## 2021-08-30 DIAGNOSIS — G30.0 EARLY ONSET ALZHEIMER'S DEMENTIA WITHOUT BEHAVIORAL DISTURBANCE (HCC): Primary | ICD-10-CM

## 2021-08-30 DIAGNOSIS — F02.80 EARLY ONSET ALZHEIMER'S DEMENTIA WITHOUT BEHAVIORAL DISTURBANCE (HCC): ICD-10-CM

## 2021-08-30 LAB
FOLATE SERPL-MCNC: 7.35 NG/ML (ref 4.78–24.2)
VIT B12 BLD-MCNC: 1242 PG/ML (ref 211–946)

## 2021-08-30 PROCEDURE — 84446 ASSAY OF VITAMIN E: CPT

## 2021-08-30 PROCEDURE — 99214 OFFICE O/P EST MOD 30 MIN: CPT | Performed by: PSYCHIATRY & NEUROLOGY

## 2021-08-30 PROCEDURE — 82746 ASSAY OF FOLIC ACID SERUM: CPT

## 2021-08-30 PROCEDURE — 36415 COLL VENOUS BLD VENIPUNCTURE: CPT

## 2021-08-30 PROCEDURE — 82607 VITAMIN B-12: CPT

## 2021-08-30 PROCEDURE — 82652 VIT D 1 25-DIHYDROXY: CPT

## 2021-08-30 RX ORDER — OMEPRAZOLE 20 MG/1
CAPSULE, DELAYED RELEASE ORAL
COMMUNITY
Start: 2021-08-23 | End: 2022-01-18

## 2021-08-30 RX ORDER — RIVASTIGMINE 4.6 MG/24H
1 PATCH, EXTENDED RELEASE TRANSDERMAL DAILY
Qty: 30 PATCH | Refills: 3 | Status: SHIPPED | OUTPATIENT
Start: 2021-08-30 | End: 2022-01-18

## 2021-09-01 LAB — 1,25(OH)2D SERPL-MCNC: 70.3 PG/ML (ref 19.9–79.3)

## 2021-09-02 LAB
A-TOCOPHEROL VIT E SERPL-MCNC: 9.5 MG/L (ref 9–29)
GAMMA TOCOPHEROL SERPL-MCNC: 2 MG/L (ref 0.5–4.9)

## 2021-09-27 DIAGNOSIS — R35.0 FREQUENCY OF URINATION: Primary | ICD-10-CM

## 2021-09-27 RX ORDER — SULFAMETHOXAZOLE AND TRIMETHOPRIM 800; 160 MG/1; MG/1
1 TABLET ORAL 2 TIMES DAILY
Qty: 20 TABLET | Refills: 0 | Status: SHIPPED | OUTPATIENT
Start: 2021-09-27 | End: 2022-01-18

## 2021-10-18 ENCOUNTER — FLU SHOT (OUTPATIENT)
Dept: FAMILY MEDICINE CLINIC | Facility: CLINIC | Age: 76
End: 2021-10-18

## 2021-10-18 DIAGNOSIS — Z23 NEED FOR INFLUENZA VACCINATION: Primary | ICD-10-CM

## 2021-10-18 PROCEDURE — G0008 ADMIN INFLUENZA VIRUS VAC: HCPCS | Performed by: FAMILY MEDICINE

## 2021-10-18 PROCEDURE — 90662 IIV NO PRSV INCREASED AG IM: CPT | Performed by: FAMILY MEDICINE

## 2021-11-29 ENCOUNTER — TELEPHONE (OUTPATIENT)
Dept: FAMILY MEDICINE CLINIC | Facility: CLINIC | Age: 76
End: 2021-11-29

## 2021-11-29 DIAGNOSIS — N30.00 ACUTE CYSTITIS WITHOUT HEMATURIA: Primary | ICD-10-CM

## 2021-11-29 RX ORDER — CEPHALEXIN 500 MG/1
500 CAPSULE ORAL 3 TIMES DAILY
Qty: 30 CAPSULE | Refills: 0 | Status: SHIPPED | OUTPATIENT
Start: 2021-11-29 | End: 2022-01-18

## 2021-11-29 NOTE — TELEPHONE ENCOUNTER
Caller: SOLISPEREZ    Relationship: Emergency Contact    Best call back number: [phone]    What medication are you requesting: BACTRIM    What are your current symptoms: STRONG SMELLING URINE, URGENCY TO URINATE    How long have you been experiencing symptoms: SINCE Saturday, 11/27/21    Have you had these symptoms before:    [x] Yes  [] No    Have you been treated for these symptoms before:   [x] Yes  [] No    If a prescription is needed, what is your preferred pharmacy and phone number: [facility], [place] - [address] [facility] [address]  - [phone] [facility] [phone]      Additional notes: PATIENT'S DAUGHTER HAD HER DO AN AT HOME URINARY TRACT INFECTION TEST AND IT CAME BACK POSITIVE

## 2021-11-29 NOTE — TELEPHONE ENCOUNTER
----- Message from Salud Gama sent at 11/29/2021  8:37 AM EST -----  Regarding: Bladder infection   Mom tested positive for another bladder infection using a drug store test strip.     Can you please send in a prescription to clear up to [facility] in [place]?    Thank you,  Miranda Solorzano/daughter  [phone]

## 2021-11-30 RX ORDER — MEMANTINE HYDROCHLORIDE 10 MG/1
TABLET ORAL
Qty: 60 TABLET | Refills: 0 | Status: SHIPPED | OUTPATIENT
Start: 2021-11-30 | End: 2022-01-18

## 2021-12-31 ENCOUNTER — TELEPHONE (OUTPATIENT)
Dept: FAMILY MEDICINE CLINIC | Facility: CLINIC | Age: 76
End: 2021-12-31

## 2021-12-31 RX ORDER — SULFAMETHOXAZOLE AND TRIMETHOPRIM 800; 160 MG/1; MG/1
1 TABLET ORAL 2 TIMES DAILY
Qty: 20 TABLET | Refills: 0 | Status: SHIPPED | OUTPATIENT
Start: 2021-12-31 | End: 2022-01-18

## 2021-12-31 NOTE — TELEPHONE ENCOUNTER
Put a telephone visit in for her on New Year's Riya, her daughter called reporting that Salud has burning with urination, no fever, history of recurrent UTIs, she test checked a OTC Azo test on her urine that was positive, I sent in Bactrim DS for her, her daughter agreed to schedule her back in office in 2 to 3 weeks to follow-up and get a urine culture check at that time, thanks

## 2022-01-18 ENCOUNTER — OFFICE VISIT (OUTPATIENT)
Dept: FAMILY MEDICINE CLINIC | Facility: CLINIC | Age: 77
End: 2022-01-18

## 2022-01-18 VITALS
WEIGHT: 134.2 LBS | OXYGEN SATURATION: 97 % | SYSTOLIC BLOOD PRESSURE: 122 MMHG | BODY MASS INDEX: 24.69 KG/M2 | TEMPERATURE: 98.6 F | DIASTOLIC BLOOD PRESSURE: 82 MMHG | RESPIRATION RATE: 16 BRPM | HEART RATE: 76 BPM | HEIGHT: 62 IN

## 2022-01-18 DIAGNOSIS — G30.0 EARLY ONSET ALZHEIMER'S DEMENTIA WITHOUT BEHAVIORAL DISTURBANCE: ICD-10-CM

## 2022-01-18 DIAGNOSIS — N30.91 CYSTITIS WITH HEMATURIA: ICD-10-CM

## 2022-01-18 DIAGNOSIS — K21.9 GASTROESOPHAGEAL REFLUX DISEASE WITHOUT ESOPHAGITIS: ICD-10-CM

## 2022-01-18 DIAGNOSIS — R35.0 URINARY FREQUENCY: Primary | ICD-10-CM

## 2022-01-18 DIAGNOSIS — F02.80 EARLY ONSET ALZHEIMER'S DEMENTIA WITHOUT BEHAVIORAL DISTURBANCE: ICD-10-CM

## 2022-01-18 LAB
BILIRUB BLD-MCNC: NEGATIVE MG/DL
CLARITY, POC: CLEAR
COLOR UR: YELLOW
GLUCOSE UR STRIP-MCNC: NEGATIVE MG/DL
KETONES UR QL: ABNORMAL
LEUKOCYTE EST, POC: ABNORMAL
NITRITE UR-MCNC: NEGATIVE MG/ML
PH UR: 5 [PH] (ref 5–8)
PROT UR STRIP-MCNC: ABNORMAL MG/DL
RBC # UR STRIP: ABNORMAL /UL
SP GR UR: 1.01 (ref 1–1.03)
UROBILINOGEN UR QL: NORMAL

## 2022-01-18 PROCEDURE — 81002 URINALYSIS NONAUTO W/O SCOPE: CPT | Performed by: FAMILY MEDICINE

## 2022-01-18 PROCEDURE — 99213 OFFICE O/P EST LOW 20 MIN: CPT | Performed by: FAMILY MEDICINE

## 2022-01-18 RX ORDER — OMEPRAZOLE 20 MG/1
20 CAPSULE, DELAYED RELEASE ORAL DAILY
Qty: 90 CAPSULE | Refills: 3 | Status: SHIPPED | OUTPATIENT
Start: 2022-01-18 | End: 2022-04-08

## 2022-01-18 RX ORDER — MEMANTINE HYDROCHLORIDE 10 MG/1
TABLET ORAL
Qty: 60 TABLET | Refills: 0 | Status: SHIPPED | OUTPATIENT
Start: 2022-01-18 | End: 2022-02-22

## 2022-01-18 RX ORDER — NITROFURANTOIN 25; 75 MG/1; MG/1
100 CAPSULE ORAL 2 TIMES DAILY
Qty: 14 CAPSULE | Refills: 0 | Status: SHIPPED | OUTPATIENT
Start: 2022-01-18 | End: 2022-03-16

## 2022-01-18 NOTE — PROGRESS NOTES
"Chief Complaint  Urinary Frequency    Subjective     CC  Problem List  Visit Diagnosis   Encounters  Notes  Medications  Labs  Result Review Imaging  Media    Salud Gama presents to [facility] for   Urinary Frequency   This is a recurrent problem. The current episode started more than 1 month ago (4 months). The problem has been gradually worsening. There has been no fever. Associated symptoms include frequency and urgency. Pertinent negatives include no flank pain, nausea or vomiting. The treatment provided no relief. Her past medical history is significant for recurrent UTIs.       Review of Systems   Constitutional: Negative for appetite change, fever, unexpected weight gain and unexpected weight loss.   Respiratory: Negative for shortness of breath.    Cardiovascular: Negative for chest pain, palpitations and leg swelling.   Gastrointestinal: Positive for GERD (sxs improved. ). Negative for abdominal pain, nausea and vomiting.   Endocrine: Negative for cold intolerance, heat intolerance, polydipsia and polyuria.   Genitourinary: Positive for frequency and urgency. Negative for dysuria and flank pain.   Neurological: Positive for memory problem (know early dementia. ).   Hematological: Negative for adenopathy. Does not bruise/bleed easily.        Objective   Vital Signs:   /82 (BP Location: Right arm, Patient Position: Sitting, Cuff Size: Adult)   Pulse 76   Temp 98.6 °F (37 °C) (Temporal)   Resp 16   Ht 157.5 cm (62\")   Wt 60.9 kg (134 lb 3.2 oz)   SpO2 97% Comment: Room air  BMI 24.55 kg/m²     Physical Exam  Constitutional:       General: She is not in acute distress.  Cardiovascular:      Rate and Rhythm: Normal rate and regular rhythm.      Heart sounds: No murmur heard.      Pulmonary:      Effort: Pulmonary effort is normal.      Breath sounds: Normal breath sounds. No wheezing.   Abdominal:      General: Abdomen is flat.      Palpations: " Abdomen is soft. There is no mass.      Tenderness: There is no abdominal tenderness. There is no right CVA tenderness or left CVA tenderness.   Musculoskeletal:      Cervical back: Neck supple.   Lymphadenopathy:      Cervical: No cervical adenopathy.   Skin:     Findings: No rash.   Neurological:      Mental Status: She is alert.        Result Review :Labs  Result Review  Imaging  Med Tab  Media                 Assessment and Plan CC Problem List  Visit Diagnosis  ROS  Review (Popup)  Health Maintenance  Quality  BestPractice  Medications  SmartSets  SnapShot Encounters  Media  Problem List Items Addressed This Visit        High    Early onset Alzheimer's dementia without behavioral disturbance (HCC)    Overview     28 of 30 On mental status exam 02.24.2021,   Her sxs remain stable per her daughter.  Continue meds compliant            Unprioritized    Gastroesophageal reflux disease without esophagitis    Overview     Resolution of CP and GI sxs after beginning PPI. Meds refilled.          Relevant Medications    omeprazole (priLOSEC) 20 MG capsule      Other Visit Diagnoses     Urinary frequency    -  Primary    Relevant Medications    nitrofurantoin, macrocrystal-monohydrate, (MACROBID) 100 MG capsule    Other Relevant Orders    POCT urinalysis dipstick, manual (Completed)    Urine Culture - Urine, Urine, Clean Catch (Completed)    Cystitis with hematuria        ABx fluids, we will notify daughter of cx results. follow up if no improvement    Relevant Medications    nitrofurantoin, macrocrystal-monohydrate, (MACROBID) 100 MG capsule          Follow Up Instructions Charge Capture  Follow-up Communications  Return in about 3 months (around 4/18/2022), or if symptoms worsen or fail to improve.  Patient was given instructions and counseling regarding her condition or for health maintenance advice. Please see specific information pulled into the AVS if appropriate.

## 2022-01-22 LAB
BACTERIA UR CULT: ABNORMAL
BACTERIA UR CULT: ABNORMAL
OTHER ANTIBIOTIC SUSC ISLT: ABNORMAL

## 2022-02-22 DIAGNOSIS — R35.0 URINARY FREQUENCY: Primary | ICD-10-CM

## 2022-02-22 RX ORDER — MEMANTINE HYDROCHLORIDE 10 MG/1
TABLET ORAL
Qty: 60 TABLET | Refills: 0 | Status: SHIPPED | OUTPATIENT
Start: 2022-02-22 | End: 2022-03-22

## 2022-02-22 RX ORDER — SULFAMETHOXAZOLE AND TRIMETHOPRIM 800; 160 MG/1; MG/1
1 TABLET ORAL 2 TIMES DAILY
Qty: 20 TABLET | Refills: 0 | Status: SHIPPED | OUTPATIENT
Start: 2022-02-22 | End: 2022-03-16

## 2022-03-16 ENCOUNTER — HOSPITAL ENCOUNTER (OUTPATIENT)
Dept: GENERAL RADIOLOGY | Facility: HOSPITAL | Age: 77
Discharge: HOME | End: 2022-03-16

## 2022-03-16 ENCOUNTER — PRE-ADMISSION TESTING (OUTPATIENT)
Dept: PREADMISSION TESTING | Facility: HOSPITAL | Age: 77
End: 2022-03-16

## 2022-03-16 VITALS
DIASTOLIC BLOOD PRESSURE: 61 MMHG | WEIGHT: 132 LBS | HEART RATE: 75 BPM | HEIGHT: 62 IN | SYSTOLIC BLOOD PRESSURE: 93 MMHG | OXYGEN SATURATION: 98 % | TEMPERATURE: 98.2 F | BODY MASS INDEX: 24.29 KG/M2 | RESPIRATION RATE: 16 BRPM

## 2022-03-16 LAB
ALBUMIN SERPL-MCNC: 4.2 G/DL (ref 3.5–5.2)
ALBUMIN/GLOB SERPL: 1.7 G/DL
ALP SERPL-CCNC: 94 U/L (ref 39–117)
ALT SERPL W P-5'-P-CCNC: 13 U/L (ref 1–33)
ANION GAP SERPL CALCULATED.3IONS-SCNC: 6 MMOL/L (ref 5–15)
AST SERPL-CCNC: 21 U/L (ref 1–32)
BILIRUB SERPL-MCNC: 0.3 MG/DL (ref 0–1.2)
BUN SERPL-MCNC: 18 MG/DL (ref 8–23)
BUN/CREAT SERPL: 28.1 (ref 7–25)
CALCIUM SPEC-SCNC: 9.4 MG/DL (ref 8.6–10.5)
CHLORIDE SERPL-SCNC: 105 MMOL/L (ref 98–107)
CO2 SERPL-SCNC: 30 MMOL/L (ref 22–29)
CREAT SERPL-MCNC: 0.64 MG/DL (ref 0.57–1)
DEPRECATED RDW RBC AUTO: 42.9 FL (ref 37–54)
EGFRCR SERPLBLD CKD-EPI 2021: 91.2 ML/MIN/1.73
ERYTHROCYTE [DISTWIDTH] IN BLOOD BY AUTOMATED COUNT: 13.1 % (ref 12.3–15.4)
GLOBULIN UR ELPH-MCNC: 2.5 GM/DL
GLUCOSE SERPL-MCNC: 80 MG/DL (ref 65–99)
HBA1C MFR BLD: 5.5 % (ref 4.8–5.6)
HCT VFR BLD AUTO: 36.6 % (ref 34–46.6)
HGB BLD-MCNC: 12.1 G/DL (ref 12–15.9)
INR PPP: 0.95 (ref 0.9–1.1)
MCH RBC QN AUTO: 30 PG (ref 26.6–33)
MCHC RBC AUTO-ENTMCNC: 33.1 G/DL (ref 31.5–35.7)
MCV RBC AUTO: 90.6 FL (ref 79–97)
PLATELET # BLD AUTO: 143 10*3/MM3 (ref 140–450)
PMV BLD AUTO: 10 FL (ref 6–12)
POTASSIUM SERPL-SCNC: 4.2 MMOL/L (ref 3.5–5.2)
PROT SERPL-MCNC: 6.7 G/DL (ref 6–8.5)
PROTHROMBIN TIME: 12.6 SECONDS (ref 11.7–14.2)
QT INTERVAL: 408 MS
RBC # BLD AUTO: 4.04 10*6/MM3 (ref 3.77–5.28)
SODIUM SERPL-SCNC: 141 MMOL/L (ref 136–145)
WBC NRBC COR # BLD: 5.77 10*3/MM3 (ref 3.4–10.8)

## 2022-03-16 PROCEDURE — 85027 COMPLETE CBC AUTOMATED: CPT

## 2022-03-16 PROCEDURE — 93010 ELECTROCARDIOGRAM REPORT: CPT | Performed by: INTERNAL MEDICINE

## 2022-03-16 PROCEDURE — 85610 PROTHROMBIN TIME: CPT

## 2022-03-16 PROCEDURE — 71046 X-RAY EXAM CHEST 2 VIEWS: CPT

## 2022-03-16 PROCEDURE — 73560 X-RAY EXAM OF KNEE 1 OR 2: CPT

## 2022-03-16 PROCEDURE — 80053 COMPREHEN METABOLIC PANEL: CPT

## 2022-03-16 PROCEDURE — 36415 COLL VENOUS BLD VENIPUNCTURE: CPT

## 2022-03-16 PROCEDURE — 83036 HEMOGLOBIN GLYCOSYLATED A1C: CPT

## 2022-03-16 PROCEDURE — 93005 ELECTROCARDIOGRAM TRACING: CPT

## 2022-03-16 ASSESSMENT — KOOS JR
KOOS JR SCORE: 9
KOOS JR SCORE: 63.776

## 2022-03-16 NOTE — DISCHARGE INSTRUCTIONS
Take the following medications the morning of surgery:    OMEPRAZOLE,  NAMENDA,  AND CITALOPRAM        ARRIVE AT 7:00    If you are on prescription narcotic pain medication to control your pain you may also take that medication the morning of surgery.    General Instructions:  Do not eat solid food after midnight the night before surgery.  You may drink clear liquids day of surgery but must stop at least one hour before your hospital arrival time.  It is beneficial for you to have a clear drink that contains carbohydrates the day of surgery.  We suggest a 12 to 20 ounce bottle of Gatorade or Powerade for non-diabetic patients or a 12 to 20 ounce bottle of G2 or Powerade Zero for diabetic patients. (Pediatric patients, are not advised to drink a 12 to 20 ounce carbohydrate drink)    Clear liquids are liquids you can see through.  Nothing red in color.     Plain water                               Sports drinks  Sodas                                   Gelatin (Jell-O)  Fruit juices without pulp such as white grape juice and apple juice  Popsicles that contain no fruit or yogurt  Tea or coffee (no cream or milk added)  Gatorade / Powerade  G2 / Powerade Zero      Patients who avoid smoking, chewing tobacco and alcohol for 4 weeks prior to surgery have a reduced risk of post-operative complications.  Quit smoking as many days before surgery as you can.  Do not smoke, use chewing tobacco or drink alcohol the day of surgery.   If applicable bring your C-PAP/ BI-PAP machine.  Bring any papers given to you in the doctor’s office.  Wear clean comfortable clothes.  Do not wear contact lenses, false eyelashes or make-up.  Bring a case for your glasses.   Bring crutches or walker if applicable.  Remove all piercings.  Leave jewelry and any other valuables at home.  Hair extensions with metal clips must be removed prior to surgery.  The Pre-Admission Testing nurse will instruct you to bring medications if unable to obtain an  accurate list in Pre-Admission Testing.                  Preventing a Surgical Site Infection:  For 2 to 3 days before surgery, avoid shaving with a razor because the razor can irritate skin and make it easier to develop an infection.    Any areas of open skin can increase the risk of a post-operative wound infection by allowing bacteria to enter and travel throughout the body.  Notify your surgeon if you have any skin wounds / rashes even if it is not near the expected surgical site.  The area will need assessed to determine if surgery should be delayed until it is healed.  The night prior to surgery shower using a fresh bar of anti-bacterial soap (such as Dial) and clean washcloth.  Sleep in a clean bed with clean clothing.  Do not allow pets to sleep with you.  Shower on the morning of surgery using a fresh bar of anti-bacterial soap (such as Dial) and clean washcloth.  Dry with a clean towel and dress in clean clothing.  Ask your surgeon if you will be receiving antibiotics prior to surgery.  Make sure you, your family, and all healthcare providers clean their hands with soap and water or an alcohol based hand  before caring for you or your wound.    Day of surgery:  Your arrival time is approximately two hours before your scheduled surgery time.  Upon arrival, a Pre-op nurse and Anesthesiologist will review your health history, obtain vital signs, and answer questions you may have.  The only belongings needed at this time will be a list of your home medications and if applicable your C-PAP/BI-PAP machine.  A Pre-op nurse will start an IV and you may receive medication in preparation for surgery, including something to help you relax.     Please be aware that surgery does come with discomfort.  We want to make every effort to control your discomfort so please discuss any uncontrolled symptoms with your nurse.   Your doctor will most likely have prescribed pain medications.      If you are going home  after surgery you will receive individualized written care instructions before being discharged.  A responsible adult must drive you to and from the hospital on the day of your surgery and stay with you for 24 hours.  Discharge prescriptions can be filled by the hospital pharmacy during regular pharmacy hours.  If you are having surgery late in the day/evening your prescription may be e-prescribed to your pharmacy.  Please verify your pharmacy hours or chose a 24 hour pharmacy to avoid not having access to your prescription because your pharmacy has closed for the day.    If you are staying overnight following surgery, you will be transported to your hospital room following the recovery period.  [facility] has all private rooms.    If you have any questions please call Pre-Admission Testing at [phone].  Deductibles and co-payments are collected on the day of service. Please be prepared to pay the required co-pay, deductible or deposit on the day of service as defined by your plan.    Patient Education for Self-Quarantine Process    Following your COVID testing, we strongly recommend that you wear a mask when you are with other people and practice social distancing.   Limit your activities to only required outings.  Wash your hands with soap and water frequently for at least 20 seconds.   Avoid touching your eyes, nose and mouth with unwashed hands.  Do not share anything - utensils, drinking glasses, food from the same bowl.   Sanitize household surfaces daily. Include all high touch areas (door handles, light switches, phones, countertops, etc.)    Call your surgeon immediately if you experience any of the following symptoms:  Sore Throat  Shortness of Breath or difficulty breathing  Cough  Chills  Body soreness or muscle pain  Headache  Fever  New loss of taste or smell  Do not arrive for your surgery ill.  Your procedure will need to be rescheduled to another time.  You will need to call your  physician before the day of surgery to avoid any unnecessary exposure to hospital staff as well as other patients.       CHLORHEXIDINE CLOTH INSTRUCTIONS  The morning of surgery follow these instructions using the Chlorhexidine cloths you've been given.  These steps reduce bacteria on the body.  Do not use the cloths near your eyes, ears mouth, genitalia or on open wounds.  Throw the cloths away after use but do not try to flush them down a toilet.      Open and remove one cloth at a time from the package.    Leave the cloth unfolded and begin the bathing.  Massage the skin with the cloths using gentle pressure to remove bacteria.  Do not scrub harshly.   Follow the steps below with one 2% CHG cloth per area (6 total cloths).  One cloth for neck, shoulders and chest.  One cloth for both arms, hands, fingers and underarms (do underarms last).  One cloth for the abdomen followed by groin.  One cloth for right leg and foot including between the toes.  One cloth for left leg and foot including between the toes.  The last cloth is to be used for the back of the neck, back and buttocks.    Allow the CHG to air dry 3 minutes on the skin which will give it time to work and decrease the chance of irritation.  The skin may feel sticky until it is dry.  Do not rinse with water or any other liquid or you will lose the beneficial effects of the CHG.  If mild skin irritation occurs, do rinse the skin to remove the CHG.  Report this to the nurse at time of admission.  Do not apply lotions, creams, ointments, deodorants or perfumes after using the clothes. Dress in clean clothes before coming to the hospital.

## 2022-03-17 NOTE — PAT
This pleasant elderly female is here for PAT for sched  LTKA with Dr Michel.  Pt had RTKA about 2 years ago , also with Dr Michel, and did well after surgery.  No change in pt history or medications since prev surgery.  PMH includes reflux, OA, cognitive difficulty and depression.  Lives with  at home, but has daughter for assistance after surgery.    States has been feeling well overall.  No h/o Covid and is vaccinated for same.  Denies recent acute illnesses.  No c/o CP, SOA, palpitations or syncope.     Exam reveals elderly female in NAD.  Alert and aware she is getting testing for surgery, but somewhat vague in answering questions (or just smiles).  Skin appears intact.  Chest CTA bilat.  S1S2 RRR. No edema noted.    All testing reviewed and acceptable.  May proceed with planned ortho surgery.

## 2022-03-22 RX ORDER — MEMANTINE HYDROCHLORIDE 10 MG/1
TABLET ORAL
Qty: 60 TABLET | Refills: 0 | Status: SHIPPED | OUTPATIENT
Start: 2022-03-22 | End: 2022-05-02

## 2022-03-30 DIAGNOSIS — F41.9 ANXIETY: ICD-10-CM

## 2022-03-31 RX ORDER — CITALOPRAM 20 MG/1
TABLET ORAL
Qty: 90 TABLET | Refills: 3 | Status: SHIPPED | OUTPATIENT
Start: 2022-03-31

## 2022-04-05 ENCOUNTER — LAB (OUTPATIENT)
Dept: LAB | Facility: HOSPITAL | Age: 77
End: 2022-04-05

## 2022-04-05 LAB — SARS-COV-2 ORF1AB RESP QL NAA+PROBE: NOT DETECTED

## 2022-04-05 PROCEDURE — C9803 HOPD COVID-19 SPEC COLLECT: HCPCS

## 2022-04-05 PROCEDURE — U0005 INFEC AGEN DETEC AMPLI PROBE: HCPCS

## 2022-04-05 PROCEDURE — U0004 COV-19 TEST NON-CDC HGH THRU: HCPCS

## 2022-04-06 ENCOUNTER — HOSPITAL ENCOUNTER (OUTPATIENT)
Facility: HOSPITAL | Age: 77
Discharge: HOME HEALTH | End: 2022-04-07
Attending: ORTHOPAEDIC SURGERY | Admitting: ORTHOPAEDIC SURGERY

## 2022-04-06 ENCOUNTER — APPOINTMENT (OUTPATIENT)
Dept: GENERAL RADIOLOGY | Facility: HOSPITAL | Age: 77
End: 2022-04-06

## 2022-04-06 ENCOUNTER — ANESTHESIA EVENT (OUTPATIENT)
Dept: PERIOP | Facility: HOSPITAL | Age: 77
End: 2022-04-06

## 2022-04-06 ENCOUNTER — ANESTHESIA (OUTPATIENT)
Dept: PERIOP | Facility: HOSPITAL | Age: 77
End: 2022-04-06

## 2022-04-06 DIAGNOSIS — Z96.652 TOTAL KNEE REPLACEMENT STATUS, LEFT: Primary | ICD-10-CM

## 2022-04-06 DIAGNOSIS — Z96.652 S/P TKR (TOTAL KNEE REPLACEMENT), LEFT: ICD-10-CM

## 2022-04-06 LAB
HCT VFR BLD AUTO: 32.7 % (ref 34–46.6)
HGB BLD-MCNC: 10.5 G/DL (ref 12–15.9)

## 2022-04-06 PROCEDURE — G0378 HOSPITAL OBSERVATION PER HR: HCPCS

## 2022-04-06 PROCEDURE — 25010000002 ROPIVACAINE PER 1 MG: Performed by: ORTHOPAEDIC SURGERY

## 2022-04-06 PROCEDURE — 97161 PT EVAL LOW COMPLEX 20 MIN: CPT

## 2022-04-06 PROCEDURE — 25010000002 CLONIDINE PER 1 MG: Performed by: ORTHOPAEDIC SURGERY

## 2022-04-06 PROCEDURE — C1889 IMPLANT/INSERT DEVICE, NOC: HCPCS | Performed by: ORTHOPAEDIC SURGERY

## 2022-04-06 PROCEDURE — 85018 HEMOGLOBIN: CPT | Performed by: ORTHOPAEDIC SURGERY

## 2022-04-06 PROCEDURE — 25010000002 PROPOFOL 10 MG/ML EMULSION: Performed by: NURSE ANESTHETIST, CERTIFIED REGISTERED

## 2022-04-06 PROCEDURE — 97110 THERAPEUTIC EXERCISES: CPT

## 2022-04-06 PROCEDURE — 63710000001 MELOXICAM 15 MG TABLET: Performed by: ORTHOPAEDIC SURGERY

## 2022-04-06 PROCEDURE — A9270 NON-COVERED ITEM OR SERVICE: HCPCS | Performed by: ORTHOPAEDIC SURGERY

## 2022-04-06 PROCEDURE — C1713 ANCHOR/SCREW BN/BN,TIS/BN: HCPCS | Performed by: ORTHOPAEDIC SURGERY

## 2022-04-06 PROCEDURE — C1776 JOINT DEVICE (IMPLANTABLE): HCPCS | Performed by: ORTHOPAEDIC SURGERY

## 2022-04-06 PROCEDURE — 25010000002 EPINEPHRINE 1 MG/ML SOLUTION 30 ML VIAL: Performed by: ORTHOPAEDIC SURGERY

## 2022-04-06 PROCEDURE — 63710000001 ASPIRIN 81 MG TABLET DELAYED-RELEASE: Performed by: ORTHOPAEDIC SURGERY

## 2022-04-06 PROCEDURE — 25010000002 KETOROLAC TROMETHAMINE PER 15 MG: Performed by: ORTHOPAEDIC SURGERY

## 2022-04-06 PROCEDURE — 73560 X-RAY EXAM OF KNEE 1 OR 2: CPT

## 2022-04-06 PROCEDURE — 63710000001 MEMANTINE 10 MG TABLET: Performed by: ORTHOPAEDIC SURGERY

## 2022-04-06 PROCEDURE — 63710000001 HYDROCODONE-ACETAMINOPHEN 7.5-325 MG TABLET: Performed by: ORTHOPAEDIC SURGERY

## 2022-04-06 PROCEDURE — 25010000002 CEFAZOLIN IN DEXTROSE 2-4 GM/100ML-% SOLUTION: Performed by: ORTHOPAEDIC SURGERY

## 2022-04-06 PROCEDURE — 25010000002 ONDANSETRON PER 1 MG: Performed by: NURSE ANESTHETIST, CERTIFIED REGISTERED

## 2022-04-06 PROCEDURE — 25010000002 FENTANYL CITRATE (PF) 50 MCG/ML SOLUTION: Performed by: NURSE ANESTHETIST, CERTIFIED REGISTERED

## 2022-04-06 PROCEDURE — 85014 HEMATOCRIT: CPT | Performed by: ORTHOPAEDIC SURGERY

## 2022-04-06 DEVICE — TIBIAL INSERT FIXED SPHERE FLEX #2/10 MM L
Type: IMPLANTABLE DEVICE | Site: KNEE | Status: FUNCTIONAL
Brand: GMK SPHERE TOTAL KNEE SYSTEM

## 2022-04-06 DEVICE — FIXED TIBIAL TRAY CEMENTED LEFT, SIZE 2
Type: IMPLANTABLE DEVICE | Site: KNEE | Status: FUNCTIONAL
Brand: GMK PRIMARY TOTAL KNEE SYSTEM

## 2022-04-06 DEVICE — FEMUR SPHERE CEMENTED LEFT, SIZE 2
Type: IMPLANTABLE DEVICE | Site: KNEE | Status: FUNCTIONAL
Brand: GMK SPHERE TOTAL KNEE SYSTEM

## 2022-04-06 DEVICE — CMT BONE PALACOS R HI/VISC 1X40: Type: IMPLANTABLE DEVICE | Site: KNEE | Status: FUNCTIONAL

## 2022-04-06 DEVICE — DEV CONTRL TISS STRATAFIX SYMM PDS PLUS VIL CT-1 60CM: Type: IMPLANTABLE DEVICE | Site: KNEE | Status: FUNCTIONAL

## 2022-04-06 DEVICE — DEV CONTRL TISS STRATAFIX SPIRAL MNCRYL UD 3/0 PLS 30CM: Type: IMPLANTABLE DEVICE | Site: KNEE | Status: FUNCTIONAL

## 2022-04-06 DEVICE — PRIMARY EXTENSION STEM Ø 11, 30 MM
Type: IMPLANTABLE DEVICE | Site: KNEE | Status: FUNCTIONAL
Brand: GMK PRIMARY TOTAL KNEE SYSTEM

## 2022-04-06 DEVICE — TOTL KN: Type: IMPLANTABLE DEVICE | Status: FUNCTIONAL

## 2022-04-06 RX ORDER — BUPIVACAINE HYDROCHLORIDE 7.5 MG/ML
INJECTION, SOLUTION EPIDURAL; RETROBULBAR
Status: COMPLETED | OUTPATIENT
Start: 2022-04-06 | End: 2022-04-06

## 2022-04-06 RX ORDER — SODIUM CHLORIDE 0.9 % (FLUSH) 0.9 %
3-10 SYRINGE (ML) INJECTION AS NEEDED
Status: DISCONTINUED | OUTPATIENT
Start: 2022-04-06 | End: 2022-04-06 | Stop reason: HOSPADM

## 2022-04-06 RX ORDER — ONDANSETRON 2 MG/ML
INJECTION INTRAMUSCULAR; INTRAVENOUS AS NEEDED
Status: DISCONTINUED | OUTPATIENT
Start: 2022-04-06 | End: 2022-04-06 | Stop reason: SURG

## 2022-04-06 RX ORDER — FAMOTIDINE 10 MG/ML
20 INJECTION, SOLUTION INTRAVENOUS ONCE
Status: COMPLETED | OUTPATIENT
Start: 2022-04-06 | End: 2022-04-06

## 2022-04-06 RX ORDER — TRANEXAMIC ACID 100 MG/ML
INJECTION, SOLUTION INTRAVENOUS AS NEEDED
Status: DISCONTINUED | OUTPATIENT
Start: 2022-04-06 | End: 2022-04-06 | Stop reason: SURG

## 2022-04-06 RX ORDER — CITALOPRAM 20 MG/1
20 TABLET ORAL DAILY
Status: DISCONTINUED | OUTPATIENT
Start: 2022-04-06 | End: 2022-04-07 | Stop reason: HOSPADM

## 2022-04-06 RX ORDER — SODIUM CHLORIDE 0.9 % (FLUSH) 0.9 %
3 SYRINGE (ML) INJECTION EVERY 12 HOURS SCHEDULED
Status: DISCONTINUED | OUTPATIENT
Start: 2022-04-06 | End: 2022-04-07 | Stop reason: HOSPADM

## 2022-04-06 RX ORDER — HYDROMORPHONE HYDROCHLORIDE 1 MG/ML
0.5 INJECTION, SOLUTION INTRAMUSCULAR; INTRAVENOUS; SUBCUTANEOUS
Status: DISCONTINUED | OUTPATIENT
Start: 2022-04-06 | End: 2022-04-07 | Stop reason: HOSPADM

## 2022-04-06 RX ORDER — NALOXONE HCL 0.4 MG/ML
0.1 VIAL (ML) INJECTION
Status: DISCONTINUED | OUTPATIENT
Start: 2022-04-06 | End: 2022-04-07 | Stop reason: HOSPADM

## 2022-04-06 RX ORDER — MELOXICAM 15 MG/1
15 TABLET ORAL DAILY
Status: DISCONTINUED | OUTPATIENT
Start: 2022-04-06 | End: 2022-04-07 | Stop reason: HOSPADM

## 2022-04-06 RX ORDER — HYDRALAZINE HYDROCHLORIDE 20 MG/ML
5 INJECTION INTRAMUSCULAR; INTRAVENOUS
Status: DISCONTINUED | OUTPATIENT
Start: 2022-04-06 | End: 2022-04-06 | Stop reason: HOSPADM

## 2022-04-06 RX ORDER — FLUMAZENIL 0.1 MG/ML
0.2 INJECTION INTRAVENOUS AS NEEDED
Status: DISCONTINUED | OUTPATIENT
Start: 2022-04-06 | End: 2022-04-06 | Stop reason: HOSPADM

## 2022-04-06 RX ORDER — CEFAZOLIN SODIUM 2 G/100ML
2 INJECTION, SOLUTION INTRAVENOUS EVERY 8 HOURS
Status: COMPLETED | OUTPATIENT
Start: 2022-04-06 | End: 2022-04-07

## 2022-04-06 RX ORDER — ACETAMINOPHEN 325 MG/1
325 TABLET ORAL EVERY 4 HOURS PRN
Status: DISCONTINUED | OUTPATIENT
Start: 2022-04-06 | End: 2022-04-07 | Stop reason: HOSPADM

## 2022-04-06 RX ORDER — NALOXONE HCL 0.4 MG/ML
0.2 VIAL (ML) INJECTION AS NEEDED
Status: DISCONTINUED | OUTPATIENT
Start: 2022-04-06 | End: 2022-04-06 | Stop reason: HOSPADM

## 2022-04-06 RX ORDER — ASPIRIN 81 MG/1
81 TABLET ORAL EVERY 12 HOURS SCHEDULED
Status: DISCONTINUED | OUTPATIENT
Start: 2022-04-06 | End: 2022-04-07 | Stop reason: HOSPADM

## 2022-04-06 RX ORDER — FENTANYL CITRATE 50 UG/ML
50 INJECTION, SOLUTION INTRAMUSCULAR; INTRAVENOUS
Status: DISCONTINUED | OUTPATIENT
Start: 2022-04-06 | End: 2022-04-06 | Stop reason: HOSPADM

## 2022-04-06 RX ORDER — HYDROCODONE BITARTRATE AND ACETAMINOPHEN 7.5; 325 MG/1; MG/1
1 TABLET ORAL EVERY 4 HOURS PRN
Status: DISCONTINUED | OUTPATIENT
Start: 2022-04-06 | End: 2022-04-07 | Stop reason: HOSPADM

## 2022-04-06 RX ORDER — MEMANTINE HYDROCHLORIDE 10 MG/1
10 TABLET ORAL 2 TIMES DAILY
Status: DISCONTINUED | OUTPATIENT
Start: 2022-04-06 | End: 2022-04-07 | Stop reason: HOSPADM

## 2022-04-06 RX ORDER — LIDOCAINE HYDROCHLORIDE 10 MG/ML
0.5 INJECTION, SOLUTION EPIDURAL; INFILTRATION; INTRACAUDAL; PERINEURAL ONCE AS NEEDED
Status: DISCONTINUED | OUTPATIENT
Start: 2022-04-06 | End: 2022-04-06 | Stop reason: HOSPADM

## 2022-04-06 RX ORDER — OXYCODONE AND ACETAMINOPHEN 7.5; 325 MG/1; MG/1
1 TABLET ORAL EVERY 4 HOURS PRN
Status: DISCONTINUED | OUTPATIENT
Start: 2022-04-06 | End: 2022-04-06 | Stop reason: HOSPADM

## 2022-04-06 RX ORDER — SODIUM CHLORIDE, SODIUM LACTATE, POTASSIUM CHLORIDE, CALCIUM CHLORIDE 600; 310; 30; 20 MG/100ML; MG/100ML; MG/100ML; MG/100ML
100 INJECTION, SOLUTION INTRAVENOUS CONTINUOUS
Status: DISCONTINUED | OUTPATIENT
Start: 2022-04-06 | End: 2022-04-07 | Stop reason: HOSPADM

## 2022-04-06 RX ORDER — ONDANSETRON 2 MG/ML
4 INJECTION INTRAMUSCULAR; INTRAVENOUS EVERY 6 HOURS PRN
Status: DISCONTINUED | OUTPATIENT
Start: 2022-04-06 | End: 2022-04-07 | Stop reason: HOSPADM

## 2022-04-06 RX ORDER — DIPHENHYDRAMINE HCL 25 MG
25 CAPSULE ORAL
Status: DISCONTINUED | OUTPATIENT
Start: 2022-04-06 | End: 2022-04-06 | Stop reason: HOSPADM

## 2022-04-06 RX ORDER — LABETALOL HYDROCHLORIDE 5 MG/ML
5 INJECTION, SOLUTION INTRAVENOUS
Status: DISCONTINUED | OUTPATIENT
Start: 2022-04-06 | End: 2022-04-06 | Stop reason: HOSPADM

## 2022-04-06 RX ORDER — PROMETHAZINE HYDROCHLORIDE 25 MG/1
25 SUPPOSITORY RECTAL ONCE AS NEEDED
Status: DISCONTINUED | OUTPATIENT
Start: 2022-04-06 | End: 2022-04-06 | Stop reason: HOSPADM

## 2022-04-06 RX ORDER — PROMETHAZINE HYDROCHLORIDE 25 MG/1
25 TABLET ORAL ONCE AS NEEDED
Status: DISCONTINUED | OUTPATIENT
Start: 2022-04-06 | End: 2022-04-06 | Stop reason: HOSPADM

## 2022-04-06 RX ORDER — HYDROCODONE BITARTRATE AND ACETAMINOPHEN 7.5; 325 MG/1; MG/1
1 TABLET ORAL ONCE AS NEEDED
Status: DISCONTINUED | OUTPATIENT
Start: 2022-04-06 | End: 2022-04-06 | Stop reason: HOSPADM

## 2022-04-06 RX ORDER — MIDAZOLAM HYDROCHLORIDE 1 MG/ML
0.5 INJECTION INTRAMUSCULAR; INTRAVENOUS
Status: DISCONTINUED | OUTPATIENT
Start: 2022-04-06 | End: 2022-04-06 | Stop reason: HOSPADM

## 2022-04-06 RX ORDER — SODIUM CHLORIDE 0.9 % (FLUSH) 0.9 %
3-10 SYRINGE (ML) INJECTION AS NEEDED
Status: DISCONTINUED | OUTPATIENT
Start: 2022-04-06 | End: 2022-04-07 | Stop reason: HOSPADM

## 2022-04-06 RX ORDER — FENTANYL CITRATE 50 UG/ML
INJECTION, SOLUTION INTRAMUSCULAR; INTRAVENOUS AS NEEDED
Status: DISCONTINUED | OUTPATIENT
Start: 2022-04-06 | End: 2022-04-06 | Stop reason: SURG

## 2022-04-06 RX ORDER — CEFAZOLIN SODIUM 2 G/100ML
2 INJECTION, SOLUTION INTRAVENOUS ONCE
Status: COMPLETED | OUTPATIENT
Start: 2022-04-06 | End: 2022-04-06

## 2022-04-06 RX ORDER — HYDROMORPHONE HYDROCHLORIDE 1 MG/ML
0.5 INJECTION, SOLUTION INTRAMUSCULAR; INTRAVENOUS; SUBCUTANEOUS
Status: DISCONTINUED | OUTPATIENT
Start: 2022-04-06 | End: 2022-04-06 | Stop reason: HOSPADM

## 2022-04-06 RX ORDER — HYDROCODONE BITARTRATE AND ACETAMINOPHEN 7.5; 325 MG/1; MG/1
2 TABLET ORAL EVERY 4 HOURS PRN
Status: DISCONTINUED | OUTPATIENT
Start: 2022-04-06 | End: 2022-04-07 | Stop reason: HOSPADM

## 2022-04-06 RX ORDER — PANTOPRAZOLE SODIUM 40 MG/1
40 TABLET, DELAYED RELEASE ORAL EVERY MORNING
Status: DISCONTINUED | OUTPATIENT
Start: 2022-04-06 | End: 2022-04-07 | Stop reason: HOSPADM

## 2022-04-06 RX ORDER — DIPHENHYDRAMINE HYDROCHLORIDE 50 MG/ML
12.5 INJECTION INTRAMUSCULAR; INTRAVENOUS
Status: DISCONTINUED | OUTPATIENT
Start: 2022-04-06 | End: 2022-04-06 | Stop reason: HOSPADM

## 2022-04-06 RX ORDER — IBUPROFEN 600 MG/1
600 TABLET ORAL ONCE AS NEEDED
Status: DISCONTINUED | OUTPATIENT
Start: 2022-04-06 | End: 2022-04-06 | Stop reason: HOSPADM

## 2022-04-06 RX ORDER — SODIUM CHLORIDE, SODIUM LACTATE, POTASSIUM CHLORIDE, CALCIUM CHLORIDE 600; 310; 30; 20 MG/100ML; MG/100ML; MG/100ML; MG/100ML
9 INJECTION, SOLUTION INTRAVENOUS CONTINUOUS
Status: DISCONTINUED | OUTPATIENT
Start: 2022-04-06 | End: 2022-04-07 | Stop reason: HOSPADM

## 2022-04-06 RX ORDER — ONDANSETRON 2 MG/ML
4 INJECTION INTRAMUSCULAR; INTRAVENOUS ONCE AS NEEDED
Status: DISCONTINUED | OUTPATIENT
Start: 2022-04-06 | End: 2022-04-06 | Stop reason: HOSPADM

## 2022-04-06 RX ORDER — KETOROLAC TROMETHAMINE 30 MG/ML
15 INJECTION, SOLUTION INTRAMUSCULAR; INTRAVENOUS EVERY 6 HOURS
Status: DISPENSED | OUTPATIENT
Start: 2022-04-06 | End: 2022-04-07

## 2022-04-06 RX ORDER — SODIUM CHLORIDE 0.9 % (FLUSH) 0.9 %
3 SYRINGE (ML) INJECTION EVERY 12 HOURS SCHEDULED
Status: DISCONTINUED | OUTPATIENT
Start: 2022-04-06 | End: 2022-04-06 | Stop reason: HOSPADM

## 2022-04-06 RX ORDER — ONDANSETRON 4 MG/1
4 TABLET, FILM COATED ORAL EVERY 6 HOURS PRN
Status: DISCONTINUED | OUTPATIENT
Start: 2022-04-06 | End: 2022-04-07 | Stop reason: HOSPADM

## 2022-04-06 RX ORDER — EPHEDRINE SULFATE 50 MG/ML
5 INJECTION, SOLUTION INTRAVENOUS ONCE AS NEEDED
Status: DISCONTINUED | OUTPATIENT
Start: 2022-04-06 | End: 2022-04-06 | Stop reason: HOSPADM

## 2022-04-06 RX ADMIN — CEFAZOLIN SODIUM 2 G: 2 INJECTION, SOLUTION INTRAVENOUS at 23:30

## 2022-04-06 RX ADMIN — ONDANSETRON 4 MG: 2 INJECTION INTRAMUSCULAR; INTRAVENOUS at 09:10

## 2022-04-06 RX ADMIN — SODIUM CHLORIDE, POTASSIUM CHLORIDE, SODIUM LACTATE AND CALCIUM CHLORIDE: 600; 310; 30; 20 INJECTION, SOLUTION INTRAVENOUS at 10:20

## 2022-04-06 RX ADMIN — KETOROLAC TROMETHAMINE 15 MG: 30 INJECTION, SOLUTION INTRAMUSCULAR; INTRAVENOUS at 22:32

## 2022-04-06 RX ADMIN — PROPOFOL 50 MCG/KG/MIN: 10 INJECTION, EMULSION INTRAVENOUS at 08:58

## 2022-04-06 RX ADMIN — ASPIRIN 81 MG: 81 TABLET, COATED ORAL at 17:26

## 2022-04-06 RX ADMIN — TRANEXAMIC ACID 1000 MG: 1 INJECTION, SOLUTION INTRAVENOUS at 09:18

## 2022-04-06 RX ADMIN — TRANEXAMIC ACID 1000 MG: 1 INJECTION, SOLUTION INTRAVENOUS at 10:11

## 2022-04-06 RX ADMIN — FENTANYL CITRATE 100 MCG: 0.05 INJECTION, SOLUTION INTRAMUSCULAR; INTRAVENOUS at 08:57

## 2022-04-06 RX ADMIN — SODIUM CHLORIDE, POTASSIUM CHLORIDE, SODIUM LACTATE AND CALCIUM CHLORIDE 9 ML/HR: 600; 310; 30; 20 INJECTION, SOLUTION INTRAVENOUS at 07:25

## 2022-04-06 RX ADMIN — KETOROLAC TROMETHAMINE 15 MG: 30 INJECTION, SOLUTION INTRAMUSCULAR; INTRAVENOUS at 10:56

## 2022-04-06 RX ADMIN — MELOXICAM 15 MG: 15 TABLET ORAL at 13:26

## 2022-04-06 RX ADMIN — FAMOTIDINE 20 MG: 10 INJECTION INTRAVENOUS at 08:18

## 2022-04-06 RX ADMIN — BUPIVACAINE HYDROCHLORIDE 1.4 ML: 7.5 INJECTION, SOLUTION EPIDURAL; RETROBULBAR at 08:57

## 2022-04-06 RX ADMIN — KETOROLAC TROMETHAMINE 15 MG: 30 INJECTION, SOLUTION INTRAMUSCULAR; INTRAVENOUS at 15:49

## 2022-04-06 RX ADMIN — HYDROCODONE BITARTRATE AND ACETAMINOPHEN 1 TABLET: 7.5; 325 TABLET ORAL at 15:49

## 2022-04-06 RX ADMIN — CEFAZOLIN SODIUM 2 G: 2 INJECTION, SOLUTION INTRAVENOUS at 15:48

## 2022-04-06 RX ADMIN — MEMANTINE HYDROCHLORIDE 10 MG: 10 TABLET, FILM COATED ORAL at 20:25

## 2022-04-06 RX ADMIN — TRANEXAMIC ACID 1000 MG: 1 INJECTION, SOLUTION INTRAVENOUS at 10:09

## 2022-04-06 RX ADMIN — CEFAZOLIN SODIUM 2 G: 2 INJECTION, SOLUTION INTRAVENOUS at 08:36

## 2022-04-06 NOTE — H&P
ORTHOPEDIC SURGERY PRE-OP HISTORY AND PHYSICAL      Patient: Salud Gama  Date of Admission: 4/6/2022  6:06 AM  YOB: 1945  Medical Record Number: [id number]  Attending Physician: Panda Michel MD  Consulting Physician: Padna Michel MD    CHIEF COMPLAINT: Left Knee Pain.    HISTORY OF PRESENT ILLINESS: Patient is a 77 y.o. female presents to [facility] with above complaints.  The patient failed conservative treatment and patient requested surgical intervention.  The patient presents for a  Left total knee.    ALLERGIES: No Known Allergies    HOME MEDICATIONS:  Medications Prior to Admission   Medication Sig Dispense Refill Last Dose   • Chlorhexidine Gluconate 2 % pads Apply  topically. As directed pre op      • Cholecalciferol (VITAMIN D) 25 MCG (1000 UT) tablet Take 1 tablet by mouth Daily.      • citalopram (CeleXA) 20 MG tablet Take 1 tablet by mouth once daily 90 tablet 3    • memantine (NAMENDA) 10 MG tablet Take 1 tablet by mouth twice daily 60 tablet 0    • OIL OF OREGANO PO Take 1 capsule by mouth Daily. TO HOLD 1 WEEK BEFORE SURGERY      • omeprazole (priLOSEC) 20 MG capsule Take 1 capsule by mouth Daily. 90 capsule 3    • Probiotic Product (PROBIOTIC-10 PO) Take 1 capsule by mouth Every Morning.      • vitamin B-12 (CYANOCOBALAMIN) 100 MCG tablet Take 50 mcg by mouth Daily.          Past Medical History:   Diagnosis Date   • Cancer (HCC)     HIST OF CERVICAL   • Cataract    • Depression    • Falls frequently     R/T KNEE AND BALANCE ISSUE   • History of cervical cancer 1974   • History of hematuria    • History of shingles    • History of total knee arthroplasty, right 07/29/2020   • Internal hemorrhoids    • Left knee pain    • Memory loss     SHORT TERM   • Osteoarthritis    • Osteopenia of multiple sites    • Primary osteoarthritis of right knee    • Pseudodementia    • Urinary retention    • Varicose veins of legs      Past Surgical History:   Procedure  Laterality Date   • CATARACT EXTRACTION, BILATERAL Bilateral    • CYSTOSCOPY  2013    Dr. Ayers   • ENTEROCELE REPAIR  07/24/2013   • INCONTINENCE SURGERY  07/24/2013   • LIVER BIOPSY  06/2018    wedge   • TOTAL KNEE ARTHROPLASTY Right 07/29/2020    Procedure: RIGHT TOTAL KNEE ARTHROPLASTY;  Surgeon: Panda Michel MD;  Location: [facility];  Service: Orthopedics;  Laterality: Right;   • VAGINAL HYSTERECTOMY  1974    ovaries remain. Cervical cancer     Social History     Occupational History   • Not on file   Tobacco Use   • Smoking status: Never Smoker   • Smokeless tobacco: Never Used   Vaping Use   • Vaping Use: Never used   Substance and Sexual Activity   • Alcohol use: No   • Drug use: No   • Sexual activity: Defer      Social History     Social History Narrative   • Not on file     Family History   Problem Relation Age of Onset   • Arthritis Mother    • Diabetes Mother    • Hyperlipidemia Mother    • Heart disease Father    • Diabetes Father    • Colon cancer Father    • Arthritis Sister    • Seizures Sister    • Arthritis Brother    • Diabetes Brother    • Malig Hyperthermia Neg Hx        REVIEW OF SYSTEMS:    HEENT: Patient denies any headaches, vision changes, change in hearing, or tinnitus, Patient denies epistaxis, sinus pain, hoarseness, or dysphagia   Pulmonary: Patient denies any cough, congestion, acute change in SOA or wheezing.   Cardiovascular: Patient denies any change in chest pain, dyspnea, palpitations, weakness, intolerance of exercise, varicosities, change in murmur   Gastrointestinal:  Patient denies change in appetite, melena, change in bowel habits.   Genital/Urinary: Patient denies dysuria, change in color of urine, change in frequency of urination, pain with urgency, change in incontinence, retention.   Musculoskeletal: Patient denies complaints of acute changes in symptoms of other joints not mentioned above.   Neurological: Patient denies changes in dizziness, tremor, ataxia, or  difficulty in speaking or changes in memory.   Endocrine system: Patient denies acute changes in tremors, palpitations, polyuria, polydipsia, polyphagia, diaphoresis, exophthalmos, or goiter.   Psychological: Patient denies thoughts/plans of harming self or other; denies acute changes in depression,  insomnia, night terrors, janessa, disorientation.   Skin: Patient denies any bruising, rashes, discoloration, pruritus,or wounds not mentioned in history of present illness or chief complaint above.   Hematopoietic: Patient denies current bleeding, epistaxis, hematuria, or melena.    PHYSICAL EXAM:   Vitals:  There were no vitals filed for this visit.    General:  77 y.o. female who appears about stated age.    Alert, cooperative, in no acute distress                       Head:    Normocephalic, without obvious abnormality, atraumatic   Eyes:            Lids and lashes normal, conjunctivae and sclerae normal, no         icterus, no pallor, corneas clear, PERRLA   Ears:    Ears appear intact with no abnormalities noted   Throat:   No oral lesions, no thrush, oral mucosa moist   Neck:   No adenopathy, supple, trachea midline, no JVD   Back:     Limited exam shows no severe kyphosis present,no visible           erythema, no excessive  tenderness to palpation.    Lungs:     Respirations regular, even and unlabored.     Heart:    Normal rate, Pulses palpable   Chest Wall:    No abnormalities observed.   Abdomen:     Normal bowel sounds, no masses, no organomegaly, soft              non-tender, non-distended, no guarding, no rebound                      tenderness   Rectal:     Deferred   Pulses:   Pulses palpable and equal bilaterally   Skin:   No bleeding, bruising or rash   Lymph nodes:   No palpable adenopathy   Extremities:     Left Knee: Skin intact.  Painful ROM.  NVI distally.      DIAGNOSTIC TEST:  Lab on 04/05/2022   Component Date Value Ref Range Status   • COVID19 04/05/2022 Not Detected  Not Detected - Ref. Range  Final       No results found.      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    Risks and benefits of surgical intervention were discussed in detail with the patient.  Risks of infection, fracture, dislocation, extremity length discrepancy, neurovascular injury, persistent pain, medical risks, anesthetic risk, need for additional surgery, deep venous thrombosis, pulmonary embolism and death.      The above diagnosis and treatment plan was discussed with the patient and/or family.  They were educated in both non-surgical and surgical treatment options for their condition.   They were given the opportunity to ask questions and were answered to their satisfaction.  They agreed to proceed with the above treatment plan.        Panda Michel MD  Date: 4/6/2022

## 2022-04-06 NOTE — ANESTHESIA PROCEDURE NOTES
Spinal Block      Patient location during procedure: OB  Indication:procedure for pain  Performed By  Anesthesiologist: Reece Castro MD  CRNA: Lucia Arredondo CRNA  Preanesthetic Checklist  Completed: patient identified, IV checked, site marked, risks and benefits discussed, surgical consent, monitors and equipment checked, pre-op evaluation and timeout performed  Spinal Block Prep:  Patient Position:sitting  Sterile Tech:cap, mask, gloves and sterile barriers  Prep:Chloraprep  Patient Monitoring:blood pressure monitoring, continuous pulse oximetry and EKG  Spinal Block Procedure  Approach:midline  Guidance:landmark technique and palpation technique  Location:L3-L4  Needle Type:Valdemar  Needle Gauge:25  Placement of Spinal needle event:cerebrospinal fluid aspirated  Paresthesia: no  Fluid Appearance:clear  Medications: bupivacaine PF (MARCAINE) 0.75 % injection, 1.4 mL  Med Administered at 4/6/2022 8:57 AM   Post Assessment  Patient Tolerance:patient tolerated the procedure well with no apparent complications  Complications no

## 2022-04-06 NOTE — PLAN OF CARE
Goal Outcome Evaluation:  Plan of Care Reviewed With: patient           Outcome Evaluation: Pt. presents with typical post op impairments related to TKR surgery that include decreased ROM, decreased strength, and decreased balance.  Pt. will benefit from skilled inpt. P.T. to address her functional deficits and to assist pt. in regaining her maximum level of independence with functional mobility.    Patient was wearing a face mask during this therapy encounter. Therapist used appropriate personal protective equipment including eye protection, mask, and gloves.  Mask used was standard procedure mask. Appropriate PPE was worn during the entire therapy session. Hand hygiene was completed before and after therapy session. Patient is not in enhanced droplet precautions.

## 2022-04-06 NOTE — ANESTHESIA PREPROCEDURE EVALUATION
Anesthesia Evaluation     Patient summary reviewed and Nursing notes reviewed   NPO Solid Status: > 8 hours  NPO Liquid Status: > 6 hours           Airway   Mallampati: II  Neck ROM: full  Dental - normal exam     Pulmonary     breath sounds clear to auscultation  Cardiovascular     Rhythm: regular        Neuro/Psych  (+) psychiatric history Depression, dementia,    GI/Hepatic/Renal/Endo    (+)  GERD,      Musculoskeletal     Abdominal    Substance History      OB/GYN          Other   arthritis,    history of cancer                    Anesthesia Plan    ASA 3     spinal       Anesthetic plan, all risks, benefits, and alternatives have been provided, discussed and informed consent has been obtained with: patient.        CODE STATUS:

## 2022-04-06 NOTE — OP NOTE
Panda Michel MD  Left TOTAL KNEE ARTHROPLASTY  Procedure Note    Sauld Gama  4/6/2022    Pre-op Diagnosis: Osteoarthritis left knee primary generalized  Post-op Diagnosis:Same  Procedure: Left total Knee Arthroplasty cpt 30828  Surgeon:  Panda Michel MD  Assistant:    Bert Bello NP  The services of a first assist were necessary for performing the procedure safely and expeditiously.  The first assist was present for the entire duration of the case and helped with positioning, retraction and closure of the incision.    Anesthesia: Spinal, Anesthesiologist: Reece Catsro MD  CRNA: Lucia Arredondo CRNA  Staff: Circulator: Carolynn Lira RN  Scrub Person: Jaya Ferreira  Assistant: Bert Bello APRN CFA  Estimated Blood Loss: minimal  Specimens: * No orders in the log *  Drains: none  Complications: None    Components Utilized:   Implant Name Type Inv. Item Serial No.  Lot No. LRB No. Used Action   CMT BONE PALACOS R HI/VISC 1X40 - [id number] Implant CMT BONE PALACOS R HI/VISC 1X40  [employer] [id number] Left 1 Implanted   CMT BONE PALACOS R HI/VISC 1X40 - [id number] Implant CMT BONE PALACOS R HI/VISC 1X40  [employer] [id number] Left 1 Implanted   DEV CONTRL TISS STRATAFIX SPIRAL MNCRYL UD 3/0 PLS 30CM - [id number] Implant DEV CONTRL TISS STRATAFIX SPIRAL MNCRYL UD 3/0 PLS 30CM  [employer]  DIV OF J [employer] J . Left 1 Implanted   DEV CONTRL TISS STRATAFIX SYMM PDS PLUS SAMANTHA CT-1 60CM - [id number] Implant DEV CONTRL TISS STRATAFIX SYMM PDS PLUS SAMANTHA CT-1 60CM  [employer]  DIV [employer] J [employer] J . Left 1 Implanted   Medacta Extension Stem      [id number] Left 1 Implanted   Medacta Tibia Tray Fixed     [id number] Left 1 Implanted   Medacta Femoral Component     [id number] Left 1 Implanted   Medact Tibial Insert Fixed     [id number] Left 1 Implanted         Indication for Procedure:   The patient is a 77 y.o. female presents today for a total knee arthroplasty procedure because of failure  to conservatively manage the patient's pain for arthritis.  The patient was educated in risks of surgery that could include possible risk of infection, deep venous thrombosis, pulmonary embolism, fracture, neurovascular injury, leg length discrepancy, dislocation, possible persistent pain, need for additional surgeries, anesthetic risks, medical risks including heart attack and stroke, and death.  The discussion occurred in the office pre-operatively, and patient had the opportunity to ask questions, and concerns about the proposed surgery.  The patient also understood that medicine is not an exact science, and that outcomes of the surgical procedure may be less than desired. The patient wished to proceed.  The patient was noted to have a bad 10 degree flexion contracture preoperatively    Protocols for intravenous antibiotics and venous thrombosis were followed for this patient.  IV antibiotics were infused prior to surgery and will be discontinued within 24 hours of completion of the surgical procedure.  Thrombosis prophylaxis will be initiated within 24 hours of the completion of the surgical procedure.      Procedure:   After the patient was identified in the preoperative area, and the surgical site confirmed and marked, the patient was brought to the operating room on a stretcher.  They were placed supine on the operating room table and the above anesthetic was placed uneventfully.  A time-out procedure was performed.  The intravenous antibiotics infusion was completed.  A non-sterile tourniquet was placed on the operative thigh, and was prepped and draped in the usual sterile fashion.  An Esmarch was to exsanguinate the limb, and the tourniquet was inflated.     A 10 blade scalpel was used to make a longitudinal incision from above the patella to medial to the tibial tubercle.  A mid vastus arthrotomy was performed with another 10 blade scalpel.  The fat pat was preserved and used as a medial retractor.  The  medial joint line was elevated subperiosteally with electrocautery and a Souza elevator.   The patella was everted and a lateral denervation was performed as well as a lateral facet ostectomy.  The knee was then flexed and Blanca's line was drawn on the distal femur with electrocautery.  Then the drill was placed into the distal femur into the medullary canal.  The cartilage was removed from the medial femoral condyle.  The intramedullary distal femoral valgus cutting guide set to 5 degrees of femoral valgus was then placed into the medullary canal.  It was then pinned and the oscillating saw was used to make the osteotomy.    Then the extramedullary cutting guide was placed aligned with the tibial eminence superiorly and the tibial shaft distally, and the cut was aligned for a neutral cut along the mechanical axis.  The oscillating saw was then used to complete the osteotomy cuts.     The 4-in-1 distal femoral cutting guide was then placed after a sizer was applied to the distal femur in 3° of external rotation.  The 4-in-1 cutting guide was placed the knee was examined flexion gaps were felt to be nearly equal medially and laterally.  The 4-in-1 cutting block was then used to make the femoral osteotomy.   A laminar  was then placed medially and then laterally to remove the medial and lateral meniscus and the posterior osteophytes.  At this point the posterior cruciate ligament was removed.  The posterior medial and posterior lateral capsule was preserved.  A spacer block was placed and the knee at flexion and the knee was balanced in flexion for a medial pivot.  The knee was then placed in extension and confirmed to be balanced as well.  At this point the femoral trial was applied the tibial baseplate was inserted and the tibia was floated to assess rotation.  The knee was examined again was stable throughout range of motion.   The lug holes were drilled in the distal femur.  The tibia was drilled and  broached in the typical fashion.  The trial components were then removed and the final implants were confirmed and opened.  The bone surfaces were then irrigated and dried.  Periarticular injection was placed in the posterior capsule Palacos cement was then mixed and placed on the cut bone surfaces and back side of the implants.  I did utilize a short stem extension for the tibial component due to osteopenic bone in the proximal tibia the implants were then impacted into place, and the trial polyethylene spacer was placed and the knee was placed into extension.  A stack of towels was placed under the ankle and the cement was allowed to harden.  Once the cement had cured the knee was again ranged and confirmed to be balanced and have excellent range of motion.  The definitive tibial insert was placed.  A size 10 insert was utilized the tourniquet was then dropped.  Hemostasis was obtained.  The wound was then irrigated with the pulse lavage irrigation system with a 3 liter mixture of 0.35% Betadine and normal saline.  The local mixture was injected throughout the knee for postoperative pain control.   The arthrotomy was then closed using 0 Vicryl suture in running fashion for the mid vastus approach.  #2 Ethibond sutures were used as stay sutures to close the arthrotomy followed by #1 Stratafix PDS.  The deep dermis was closed with 2-0 Vicryl.   A 3-0 running subcuticular Monocryl stratofix suture was used followed by Dermabond to seal the skin.  . Sterile silver impregnated occlusive dressings were applied.   Sponge and needle counts were completed and were correct.  The patient was awakened from anesthetic and was returned to recovery in stable condition.    The patient will receive routine postoperative antibiotic prophylaxis with Ancef.  The patient will receive aspirin 81 mg twice daily for postoperative DVT prophylaxis in conjunction with sequential compression devices.    Panda Michel MD  Orthopaedic  Surgeon    [facility]  [phone]          Date: 4/6/2022  Time: 10:23 EDT

## 2022-04-06 NOTE — PLAN OF CARE
Goal Outcome Evaluation:               Received from PACU @ 1200, LTKA, patient was a slide from PACU stretcher related to spinal, PT to see, DTV 3160, patient denies pain, planning on DC home with HH, will continue to monitor

## 2022-04-06 NOTE — DISCHARGE PLACEMENT REQUEST
"Salud Valle (77 y.o. Female)             Date of Birth   1945    Social Security Number       Address   [address] [place] [facility]    Home Phone   [phone]    MRN   [id number]       Orthodox   None    Marital Status                               Admission Date   4/6/22    Admission Type   Elective    Admitting Provider   Panda Michel MD    Attending Provider   Panda Michel MD    Department, Room/Bed   [address], 78/1       Discharge Date       Discharge Disposition       Discharge Destination                               Attending Provider: Panda Michel MD    Allergies: No Known Allergies    Isolation: None   Infection: None   Code Status: CPR   Advance Care Planning Activity    Ht: 157.5 cm (62\")   Wt: 60.1 kg (132 lb 9.6 oz)    Admission Cmt: None   Principal Problem: None                Active Insurance as of 4/6/2022     Primary Coverage     Payor Plan Insurance Group Employer/Plan Group    MEDICARE MEDICARE A & B      Payor Plan Address Payor Plan Phone Number Payor Plan Fax Number Effective Dates    [address] [phone]  2/1/2010 - None Entered    [facility] [id number]       Subscriber Name Subscriber Birth Date Member ID       SALUD VALLE 1945 [id number]           Secondary Coverage     Payor Plan Insurance Group Employer/Plan Group    [facility] SUPP [id number]     Payor Plan Address Payor Plan Phone Number Payor Plan Fax Number Effective Dates    [address]   12/1/2016 - None Entered    [facility] [id number]       Subscriber Name Subscriber Birth Date Member ID       SALUD VALLE 1945 [id number]                 Emergency Contacts      (Rel.) Home Phone Work Phone Mobile Phone    PEREZ ELY (Daughter) -- -- [phone]    LEONARDMERLE (Spouse) -- -- [phone]    BHARAT JARAMILLO (Daughter) -- -- [phone]          "

## 2022-04-06 NOTE — ANESTHESIA POSTPROCEDURE EVALUATION
"Patient: Salud Gama    Procedure Summary     Date: 04/06/22 Room / Location: [facility] OR [address] [place] [address] MAIN OR    Anesthesia Start: 0841 Anesthesia Stop: 1044    Procedure: LEFT TOTAL KNEE ARTHROPLASTY (Left Knee) Diagnosis:     Surgeons: Panda Michel MD Provider: Reece Castro MD    Anesthesia Type: spinal ASA Status: 3          Anesthesia Type: spinal    Vitals  Vitals Value Taken Time   /63 04/06/22 1101   Temp 36.3 °C (97.4 °F) 04/06/22 1043   Pulse 57 04/06/22 1114   Resp 14 04/06/22 1100   SpO2 95 % 04/06/22 1114   Vitals shown include unvalidated device data.        Post Anesthesia Care and Evaluation    Patient location during evaluation: bedside  Patient participation: complete - patient participated  Level of consciousness: awake  Pain management: adequate  Airway patency: patent  Anesthetic complications: No anesthetic complications    Cardiovascular status: acceptable  Respiratory status: acceptable  Hydration status: acceptable    Comments: */63   Pulse 58   Temp 36.3 °C (97.4 °F) (Oral)   Resp 14   Ht 157.5 cm (62\")   Wt 60.1 kg (132 lb 9.6 oz)   LMP 01/01/1976   SpO2 96%   BMI 24.25 kg/m²         "

## 2022-04-06 NOTE — CASE MANAGEMENT/SOCIAL WORK
Discharge Planning Assessment  [facility]     Patient Name: Salud Gama  MRN: [id number]  Today's Date: 4/6/2022    Admit Date: 4/6/2022     Discharge Needs Assessment    No documentation.                Discharge Plan     Row Name 04/06/22 1606       Plan    Plan Home with family support & [facility].    Patient/Family in Agreement with Plan yes    Plan Comments Spoke with the patient, her daughter/Oneida and /Eugene, verified current information and explained the role of the CCP. Patient lives with Eugene and has family support. She plans to d/c home with family support & . Careplan received from Dr. Michel's Office which plans for the patient to d/c home with [facility]. Discussed with the patient and her family who are all agreeable. Referral sent/accepted. Oneida said she plans to transport the patient home at d/c. No other needs identified. CCP will follow.              Continued Care and Services - Admitted Since 4/6/2022     Home Medical Care Coordination complete.    Service Provider Request Status Selected Services Address Phone Fax Patient Preferred    [facility]   [facility] [address] [facility] [place] [address] [phone] --                Kelsey DRAKE, RN

## 2022-04-06 NOTE — THERAPY EVALUATION
Patient Name: Salud Gama  : 1945    MRN: [id number]                              Today's Date: 2022       Admit Date: 2022    Visit Dx: No diagnosis found.  Patient Active Problem List   Diagnosis   • History of malignant neoplasm of cervix   • Osteoarthritis   • Osteopenia of multiple sites   • Varicose veins of lower extremity   • Encounter for screening mammogram for malignant neoplasm of breast   • Encounter for colorectal cancer screening   • Medicare annual wellness visit, subsequent   • Screening for malignant neoplasm of cervix   • Total knee replacement status, right   • Dense breast   • Early onset Alzheimer's dementia without behavioral disturbance (HCC)   • Chest pain syndrome   • Depression   • Gastroesophageal reflux disease without esophagitis   • Total knee replacement status, left     Past Medical History:   Diagnosis Date   • Cancer (HCC)     HIST OF CERVICAL   • Cataract    • Depression    • Falls frequently     R/T KNEE AND BALANCE ISSUE   • History of cervical cancer    • History of hematuria    • History of shingles    • History of total knee arthroplasty, right 2020   • Internal hemorrhoids    • Left knee pain    • Memory loss     SHORT TERM   • Osteoarthritis    • Osteopenia of multiple sites    • Primary osteoarthritis of right knee    • Pseudodementia    • Urinary retention    • Varicose veins of legs      Past Surgical History:   Procedure Laterality Date   • CATARACT EXTRACTION, BILATERAL Bilateral    • CYSTOSCOPY      Dr. Ayers   • ENTEROCELE REPAIR  2013   • INCONTINENCE SURGERY  2013   • LIVER BIOPSY  2018    wedge   • TOTAL KNEE ARTHROPLASTY Right 2020    Procedure: RIGHT TOTAL KNEE ARTHROPLASTY;  Surgeon: Panda Michel MD;  Location: [facility];  Service: Orthopedics;  Laterality: Right;   • VAGINAL HYSTERECTOMY      ovaries remain. Cervical cancer      General Information     Row Name 22 1546          Physical  Therapy Time and Intention    Document Type evaluation  Pt. is s/p Left TKR  -MS     Mode of Treatment individual therapy;physical therapy  -MS     Row Name 04/06/22 1546          General Information    Patient Profile Reviewed yes  -MS     Prior Level of Function independent:  -MS     Existing Precautions/Restrictions fall   Exit alarm  -MS     Barriers to Rehab none identified  -MS     Row Name 04/06/22 1546          Cognition    Orientation Status (Cognition) oriented x 3  -MS     Row Name 04/06/22 1546          Safety Issues, Functional Mobility    Comment, Safety Issues/Impairments (Mobility) Gait belt used for safety.  -MS           User Key  (r) = Recorded By, (t) = Taken By, (c) = Cosigned By    Initials Name Provider Type    Mannie Banuelos, PT Physical Therapist               Mobility     Row Name 04/06/22 1547          Bed Mobility    Bed Mobility supine-sit;sit-supine  -MS     Supine-Sit Fisher (Bed Mobility) standby assist  -MS     Row Name 04/06/22 1547          Sit-Stand Transfer    Sit-Stand Fisher (Transfers) contact guard  -MS     Assistive Device (Sit-Stand Transfers) walker, front-wheeled  -MS     Row Name 04/06/22 1547          Gait/Stairs (Locomotion)    Fisher Level (Gait) contact guard  -MS     Assistive Device (Gait) walker, front-wheeled  -MS     Distance in Feet (Gait) 25 feet  -MS     Deviations/Abnormal Patterns (Gait) mar decreased;antalgic  -MS     Bilateral Gait Deviations forward flexed posture  -MS     Comment, (Gait/Stairs) Verbal/tactile cues for posture correction.  -MS     Row Name 04/06/22 1547          Mobility    Extremity Weight-bearing Status left lower extremity  -MS     Left Lower Extremity (Weight-bearing Status) weight-bearing as tolerated (WBAT)  -MS           User Key  (r) = Recorded By, (t) = Taken By, (c) = Cosigned By    Initials Name Provider Type    Mannie Banuelos, PT Physical Therapist               Obj/Interventions     Row Name  04/06/22 1547          Range of Motion Comprehensive    Comment, General Range of Motion BUE/RLE (WFL's)  -MS     Row Name 04/06/22 1547          Strength Comprehensive (MMT)    Comment, General Manual Muscle Testing (MMT) Assessment BUE/RLE (3+/5)  -MS     Row Name 04/06/22 1547          Motor Skills    Therapeutic Exercise --  Left TKR ther. ex. program x 10 reps completed  -MS           User Key  (r) = Recorded By, (t) = Taken By, (c) = Cosigned By    Initials Name Provider Type    Mannie Banuelos, PT Physical Therapist               Goals/Plan     Row Name 04/06/22 1548          Transfer Goal 1 (PT)    Activity/Assistive Device (Transfer Goal 1, PT) transfers, all;walker, rolling  -MS     [place] Level/Cues Needed (Transfer Goal 1, PT) standby assist  -MS     Time Frame (Transfer Goal 1, PT) long term goal (LTG);3 days  -MS     [facility] Name 04/06/22 1548          Gait Training Goal 1 (PT)    Activity/Assistive Device (Gait Training Goal 1, PT) gait (walking locomotion);walker, rolling  -MS     [place] Level (Gait Training Goal 1, PT) standby assist  -MS     Distance (Gait Training Goal 1, PT) 100 feet  -MS     Time Frame (Gait Training Goal 1, PT) long term goal (LTG);3 days  -MS     [facility] Name 04/06/22 1548          ROM Goal 1 (PT)    ROM Goal 1 (PT) Left knee AROM (5, 80)  -MS     Time Frame (ROM Goal 1, PT) long-term goal (LTG);3 days  -MS     [facility] Name 04/06/22 1548          Stairs Goal 1 (PT)    Activity/Assistive Device (Stairs Goal 1, PT) stairs, all skills  -MS     [place] Level/Cues Needed (Stairs Goal 1, PT) standby assist;contact guard required  -MS     Number of Stairs (Stairs Goal 1, PT) 2  -MS     Time Frame (Stairs Goal 1, PT) long term goal (LTG);3 days  -MS           User Key  (r) = Recorded By, (t) = Taken By, (c) = Cosigned By    Initials Name Provider Type    Mannie Banuelos, PT Physical Therapist               Clinical Impression     Row Name 04/06/22 1548          Pain     Pretreatment Pain Rating 3/10  -MS     Posttreatment Pain Rating 3/10  -MS     Pain Location - Side/Orientation Left  -MS     Pain Location - knee  -MS     Row Name 04/06/22 1548          Plan of Care Review    Plan of Care Reviewed With patient;family  -MS     Row Name 04/06/22 1548          Therapy Assessment/Plan (PT)    Rehab Potential (PT) good, to achieve stated therapy goals  -MS     Criteria for Skilled Interventions Met (PT) skilled treatment is necessary  -MS     Row Name 04/06/22 1548          Positioning and Restraints    Pre-Treatment Position in bed  -MS     Post Treatment Position chair  -MS     In Chair notified nsg;reclined;sitting;call light within reach;encouraged to call for assist;exit alarm on;with family/caregiver  Ice pack to Left knee.  -MS           User Key  (r) = Recorded By, (t) = Taken By, (c) = Cosigned By    Initials Name Provider Type    Mannie Banuelos, PT Physical Therapist               Outcome Measures     Row Name 04/06/22 1549 04/06/22 1200       How much help from another person do you currently need...    Turning from your back to your side while in flat bed without using bedrails? 4  -MS 3  -NL    Moving from lying on back to sitting on the side of a flat bed without bedrails? 3  -MS 2  -NL    Moving to and from a bed to a chair (including a wheelchair)? 3  -MS 2  -NL    Standing up from a chair using your arms (e.g., wheelchair, bedside chair)? 3  -MS 3  -NL    Climbing 3-5 steps with a railing? 3  -MS 2  -NL    To walk in hospital room? 3  -MS 2  -NL    AM-PAC 6 Clicks Score (PT) 19  -MS 14  -NL    Row Name 04/06/22 1549          Functional Assessment    Outcome Measure Options AM-PAC 6 Clicks Basic Mobility (PT)  -MS           User Key  (r) = Recorded By, (t) = Taken By, (c) = Cosigned By    Initials Name Provider Type    Radha Sutton RN Registered Nurse    Mannie Banuelos, PT Physical Therapist                             Physical Therapy Education                  Title: PT OT SLP Therapies (Done)     Topic: Physical Therapy (Done)     Point: Mobility training (Done)     Learning Progress Summary           Patient Acceptance, E,D, VU,NR by MS at 4/6/2022 1549                   Point: Home exercise program (Done)     Learning Progress Summary           Patient Acceptance, E,D, VU,NR by MS at 4/6/2022 1549                   Point: Body mechanics (Done)     Learning Progress Summary           Patient Acceptance, E,D, VU,NR by MS at 4/6/2022 1549                   Point: Precautions (Done)     Learning Progress Summary           Patient Acceptance, E,D, VU,NR by MS at 4/6/2022 1549                               User Key     Initials Effective Dates Name Provider Type Discipline    MS 06/16/21 -  Mannie Rivera, PT Physical Therapist PT              PT Recommendation and Plan  Planned Therapy Interventions (PT): balance training, bed mobility training, gait training, home exercise program, patient/family education, postural re-education, transfer training, strengthening, ROM (range of motion), stair training  Plan of Care Reviewed With: patient  Outcome Evaluation: Pt. presents with typical post op impairments related to TKR surgery that include decreased ROM, decreased strength, and decreased balance.  Pt. will benefit from skilled inpt. P.T. to address her functional deficits and to assist pt. in regaining her maximum level of independence with functional mobility.     Time Calculation:    PT Charges     Row Name 04/06/22 1550             Time Calculation    Start Time 1435  -MS      Stop Time 1450  -MS      Time Calculation (min) 15 min  -MS      PT Received On 04/06/22  -MS      PT - Next Appointment 04/07/22  -MS      PT Goal Re-Cert Due Date 04/09/22  -MS              Time Calculation- PT    Total Timed Code Minutes- PT 14 minute(s)  -MS            User Key  (r) = Recorded By, (t) = Taken By, (c) = Cosigned By    Initials Name Provider Type    Mannie Banuelos  MORRO, PT Physical Therapist              Therapy Charges for Today     Code Description Service Date Service Provider Modifiers Qty    63961988352 HC PT EVAL LOW COMPLEXITY 2 4/6/2022 Mannie Rivera, PT GP 1    78296190476 HC PT THER PROC EA 15 MIN 4/6/2022 Mannie Rivera, PT GP 1          PT G-Codes  Outcome Measure Options: AM-PAC 6 Clicks Basic Mobility (PT)  AM-PAC 6 Clicks Score (PT): 19    Mannie Rivera, PT  4/6/2022

## 2022-04-07 ENCOUNTER — READMISSION MANAGEMENT (OUTPATIENT)
Dept: CALL CENTER | Facility: HOSPITAL | Age: 77
End: 2022-04-07

## 2022-04-07 ENCOUNTER — HOME HEALTH ADMISSION (OUTPATIENT)
Dept: HOME HEALTH SERVICES | Facility: HOME HEALTHCARE | Age: 77
End: 2022-04-07

## 2022-04-07 ENCOUNTER — TRANSCRIBE ORDERS (OUTPATIENT)
Dept: HOME HEALTH SERVICES | Facility: HOME HEALTHCARE | Age: 77
End: 2022-04-07

## 2022-04-07 VITALS
HEIGHT: 62 IN | HEART RATE: 63 BPM | WEIGHT: 132.6 LBS | RESPIRATION RATE: 18 BRPM | BODY MASS INDEX: 24.4 KG/M2 | SYSTOLIC BLOOD PRESSURE: 108 MMHG | DIASTOLIC BLOOD PRESSURE: 64 MMHG | OXYGEN SATURATION: 95 % | TEMPERATURE: 98.3 F

## 2022-04-07 DIAGNOSIS — Z47.1 AFTERCARE FOLLOWING LEFT KNEE JOINT REPLACEMENT SURGERY: Primary | ICD-10-CM

## 2022-04-07 DIAGNOSIS — Z96.652 AFTERCARE FOLLOWING LEFT KNEE JOINT REPLACEMENT SURGERY: Primary | ICD-10-CM

## 2022-04-07 LAB
ANION GAP SERPL CALCULATED.3IONS-SCNC: 7.5 MMOL/L (ref 5–15)
BUN SERPL-MCNC: 20 MG/DL (ref 8–23)
BUN/CREAT SERPL: 36.4 (ref 7–25)
CALCIUM SPEC-SCNC: 8.3 MG/DL (ref 8.6–10.5)
CHLORIDE SERPL-SCNC: 107 MMOL/L (ref 98–107)
CO2 SERPL-SCNC: 24.5 MMOL/L (ref 22–29)
CREAT SERPL-MCNC: 0.55 MG/DL (ref 0.57–1)
EGFRCR SERPLBLD CKD-EPI 2021: 94.5 ML/MIN/1.73
GLUCOSE SERPL-MCNC: 115 MG/DL (ref 65–99)
HCT VFR BLD AUTO: 31 % (ref 34–46.6)
HGB BLD-MCNC: 9.9 G/DL (ref 12–15.9)
POTASSIUM SERPL-SCNC: 3.9 MMOL/L (ref 3.5–5.2)
SODIUM SERPL-SCNC: 139 MMOL/L (ref 136–145)

## 2022-04-07 PROCEDURE — G0378 HOSPITAL OBSERVATION PER HR: HCPCS

## 2022-04-07 PROCEDURE — 63710000001 CITALOPRAM 20 MG TABLET: Performed by: ORTHOPAEDIC SURGERY

## 2022-04-07 PROCEDURE — 63710000001 MEMANTINE 10 MG TABLET: Performed by: ORTHOPAEDIC SURGERY

## 2022-04-07 PROCEDURE — A9270 NON-COVERED ITEM OR SERVICE: HCPCS | Performed by: ORTHOPAEDIC SURGERY

## 2022-04-07 PROCEDURE — 63710000001 HYDROCODONE-ACETAMINOPHEN 7.5-325 MG TABLET: Performed by: ORTHOPAEDIC SURGERY

## 2022-04-07 PROCEDURE — 63710000001 MELOXICAM 15 MG TABLET: Performed by: ORTHOPAEDIC SURGERY

## 2022-04-07 PROCEDURE — 97530 THERAPEUTIC ACTIVITIES: CPT

## 2022-04-07 PROCEDURE — 63710000001 PANTOPRAZOLE 40 MG TABLET DELAYED-RELEASE: Performed by: ORTHOPAEDIC SURGERY

## 2022-04-07 PROCEDURE — 97110 THERAPEUTIC EXERCISES: CPT

## 2022-04-07 PROCEDURE — 85014 HEMATOCRIT: CPT | Performed by: ORTHOPAEDIC SURGERY

## 2022-04-07 PROCEDURE — 63710000001 ASPIRIN 81 MG TABLET DELAYED-RELEASE: Performed by: ORTHOPAEDIC SURGERY

## 2022-04-07 PROCEDURE — 85018 HEMOGLOBIN: CPT | Performed by: ORTHOPAEDIC SURGERY

## 2022-04-07 PROCEDURE — 80048 BASIC METABOLIC PNL TOTAL CA: CPT | Performed by: ORTHOPAEDIC SURGERY

## 2022-04-07 RX ORDER — MELOXICAM 15 MG/1
15 TABLET ORAL DAILY
Qty: 30 TABLET | Refills: 0 | Status: SHIPPED | OUTPATIENT
Start: 2022-04-07 | End: 2022-06-13

## 2022-04-07 RX ORDER — ASPIRIN 81 MG/1
81 TABLET ORAL EVERY 12 HOURS SCHEDULED
Qty: 60 TABLET | Refills: 0 | Status: SHIPPED | OUTPATIENT
Start: 2022-04-07 | End: 2022-05-07

## 2022-04-07 RX ORDER — HYDROCODONE BITARTRATE AND ACETAMINOPHEN 7.5; 325 MG/1; MG/1
1 TABLET ORAL EVERY 6 HOURS PRN
Qty: 50 TABLET | Refills: 0 | Status: SHIPPED | OUTPATIENT
Start: 2022-04-07 | End: 2022-04-20

## 2022-04-07 RX ADMIN — MELOXICAM 15 MG: 15 TABLET ORAL at 08:50

## 2022-04-07 RX ADMIN — PANTOPRAZOLE SODIUM 40 MG: 40 TABLET, DELAYED RELEASE ORAL at 05:11

## 2022-04-07 RX ADMIN — MEMANTINE HYDROCHLORIDE 10 MG: 10 TABLET, FILM COATED ORAL at 08:50

## 2022-04-07 RX ADMIN — ASPIRIN 81 MG: 81 TABLET, COATED ORAL at 08:50

## 2022-04-07 RX ADMIN — HYDROCODONE BITARTRATE AND ACETAMINOPHEN 1 TABLET: 7.5; 325 TABLET ORAL at 10:27

## 2022-04-07 RX ADMIN — CITALOPRAM 20 MG: 20 TABLET, FILM COATED ORAL at 08:50

## 2022-04-07 RX ADMIN — HYDROCODONE BITARTRATE AND ACETAMINOPHEN 2 TABLET: 7.5; 325 TABLET ORAL at 00:35

## 2022-04-07 NOTE — PROGRESS NOTES
Patient is discharging today and will be a patient at our [place] office . Face sheet information is correct and patient agreeable to home health. Please call jono Mohamud for visit scheduling - [phone]. Will transcribe home health 0rders for home health PT. Thank you!

## 2022-04-07 NOTE — PLAN OF CARE
Goal Outcome Evaluation:  Plan of Care Reviewed With: patient, daughter        Progress: improving  Outcome Evaluation: Upon arrival pt supine in bed, oriented and agreeable to PT. Pt completed supine-sit w/ SBA and vcs for hand placement and sequencing. Pt able to maintain good static sitting balance at EOB w/o UE support. Pt completed sit-stand transfer w/ CGA and vcs for hand placement. Pt ambulated ~200' w/ FWW and CGA w/ pt exhibiting antalgic gait pattern. Pt educated and completed 5 steps w/ CGA. Pt engaged in LLE ther-ex for improved functional strength and increased activity tolerance. L knee ROM 0-100, PT will progress as pt tolerates. Pt planning on DC to home w/ assist of family and HHPT.    Patient was intermittently wearing a face mask during this therapy encounter. Therapist used appropriate personal protective equipment including eye protection, mask, and gloves.  Mask used was standard procedure mask. Appropriate PPE was worn during the entire therapy session. Hand hygiene was completed before and after therapy session. Patient is not in enhanced droplet precautions.

## 2022-04-07 NOTE — DISCHARGE INSTRUCTIONS
Dr. Panda Michel   Total Knee Replacement Discharge Instructions:  Office Phone Number: [phone]    I. ACTIVITIES:  1. Exercises:  Complete exercise program as taught by the hospital physical therapist 2 times per day.  You may wean off the walker to a cane when directed by the physical therapist.  Exercise program will be advanced by the physical therapist  During the day be up ambulating every 2 hours (while awake) for short distances  Complete the ankle pump exercises at least 10 times per hour (while awake)  Elevate legs most of the day the first week post operatively and thereafter elevate legs when in bed and for at least 30 minutes during the day. Use cold packs 20-30 minutes approximately 5 times per day. This should be done before and after completing your exercises and at any time you are experiencing pain/ stiffness in your operative extremity.      2. Activities of Daily Living:  No tub baths, hot tubs, or swimming pools for 4 weeks  The tan or skin colored dressing is on your knee is waterproof.  You may shower without covering the dressing beginning 3 days after the operation.  After 7 days you may remove the dressing.  If the dressing becomes saturated prior to day 7, it may be changed.  After dressing removal, do not scrub or rub the incision. Allow skin glue to fall off over the next few weeks.  After the dressing is removed, simply let the water run over the incision and pat dry.    II. Restrictions  Follow any movement restrictions that was discussed with you by either Dr. Michel or the physical therapist.     Avoid kneeling on the knee that was operated on  Dr. Michel will discuss with you when you will be able to drive again at your first post-op appointment.  Weight bearing is as tolerated.  First week stay inside on even terrain. May go up and down stairs one stair at a time utilizing the hand rail.  Once you feel confident, you may venture outside.    Exercises:  Perform quad sets  as instructed by the therapist at least 3 times a day  Perform leg hangs with you heel placed on a chair or footrest and allow you knee to stretch towards a more straightened position several times a day  Work on knee flexion exercises at least three times daily.  Avoid placing a pillow under your knee as this will cause your knee to become more stiff throughout the recovery process.    III. Precautions:  Everyone that comes near you should wash their hands  No elective dental, genital-urinary, or colon procedures or surgical procedures for 12 weeks after surgery unless absolutely necessary.   If dental work or surgical procedure is deemed absolutely necessary within 12 weeks of surgery, you will need to contact Dr. Michel's office as you will need to take antibiotics 1 hour prior to any dental work (including teeth cleanings).  Dr. Michel will prescribe prophylactic antibiotics for all dental procedures for one year  as a precautionary measure to minimize risk of infection.  If you are a diabetic or take immunosuppressive medication, you may have to take prophylactic antibiotics the remainder of your life before dental work.    Avoid sick people. If you must be around someone who is ill, they should wear a mask.  Avoid visits to the Emergency Room or Urgent Care unless you are having a life threatening event.   If you have leg swelling you may wear leg compression stocking.   Stockings are to be placed on in the morning and removed at night. Monitor the stockings to ensure that any swelling is not causing the stockings to become too tight. In this case, remove stockings immediately.    IV. INCISION CARE:  Dr. Michel takes great care in closing your incision to give you the best opportunity for a healthy incision with minimal scarring. He places sutures below the skin surface that will eventually dissolve.  The incision is then covered with a skin glue which makes the incision water tight, and minimizes bleeding  onto the dressing.  No staples are used.  Occasionally one of the buried stitches may come to the skin surface and may need to be removed.  Please resist the temptation of removing the stitch by yourself.   will be happy to remove it for you.  Bruising around the incision and thigh is normal and to be expected.  Please keep dressing in place at least until post-op day 7. You may remove and replace dressing before day 7 if the dressing begins to fall off or becomes saturated. Wash your hands and under your finger nails prior to dressing changes.  After day 7 as long as incision is dry and intact, you may leave the dressing off and open to air.    If dressing must be changed, utilize dry gauze and paper tape. Avoid touching the side of the gauze that goes against the incision with your hands.  No creams or ointments to the incision until permission given by Dr. Michel.  Do not touch or pick at the incision, or try to remove any sutures or skin glue.  Check dressing every day and notify surgeon immediately if any of the following signs or symptoms are noted:  Increase in redness  Increase in swelling of the entire extremity that does not go away with elevation.  Notify office that you may have a blood clot.    Drainage oozing from the incision  Pulling apart of the edges of the incision  Increase in overall body temperature (greater than 100.5 degrees)    V. Medications:   1. Anticoagulants: You will be discharged on an anticoagulant. This is a prophylactic medication that helps prevent blood clots during your post-operative period. The type and length of dosage varies based on your individual needs, procedure performed, and Dr. Michel's preference.  While taking the anticoagulant, you should avoid taking any additional aspirin than what is prescribed.   Notify surgeon immediately if any pilo bleeding is noted in the urine, stool, emesis, or from the nose or the incision. Blood in the stool will often appear  as black rather than red. Blood in urine may appear as pink. Blood in emesis may appear as brown/black like coffee grounds.  You will need to apply pressure for longer periods of time to any cuts or abrasions to stop bleeding  Avoid alcohol while taking anticoagulants.    2. Stool Softeners: You will be at greater risk of constipation after surgery due to being less mobile and the pain medications.   Take stool softeners as instructed by your surgeon while on pain medications. Over the counter Colace 100 mg 1-2 capsules twice daily.   If stools become too loose or too frequent, please decreases the dosage or stop the stool softener.  If constipation occurs despite use of stool softeners, you are to continue the stool softeners and add a laxative (Milk of Magnesia 1 ounce daily as needed).  If no bowel movement occurs past 3 days, then purchase Magnesium citrate and drink 1/2 bottle every 8 hours (on ice tastes better) until success. If no bowel movement by post-operative day 5 please call Dr. Michel office for further instructions.   You may need to decrease or stop your pain medications if bowel movements to not occur.     Drink plenty of fluids, and eat fruits and vegetables during your recovery time.    3. Pain Medications utilized after surgery are narcotics and the law requires that the following information be given to all patients that are prescribed narcotics:  CLASSIFICATION: Pain medications are called Opioids and are narcotics  LEGALITIES: It is illegal to share narcotics with others and to drive within 24 hours of taking narcotics  POTENTIAL SIDE EFFECTS: Potential side effects of opioids include: nausea, vomiting, itching, dizziness, drowsiness, dry mouth, constipation, and difficulty urinating.  POTENTIAL ADVERSE EFFECTS:   Opioid tolerance can develop with use of pain medications and this simply means that it requires more and more of the medication to control pain; however, this is seen more in  "patients that use opioids for longer periods of time.  Opioid dependence can develop with use of Opioids and this simply means that to stop the medication can cause withdrawal symptoms; however, this is seen with patients that use Opioids for longer periods of time.  Opioid addiction can develop with use of Opioids and the incidence of this is very unlikely in patients who take the medications as ordered and stop the medications as instructed.  Opioid overdose can be dangerous, but is unlikely when the medication is taken as ordered and stopped when ordered. It is important not to mix opioids with alcohol or with and type of sedative such as Benadryl as this can lead to over sedation and respiratory difficulty.  DOSAGE:   Pain medications will need to be taken consistently for the first few days to decrease pain and promote adequate pain relief and participation in physical therapy.  After the initial surgical pain begins to resolve, you may begin to decrease the pain medication. By the end of 6 weeks, you should be off of pain medications except for before physical therapy or to help with pain when attempting to fall asleep.  Pain medications will be tapered to lesser dosages as you are further from your surgical day.  No pain medications will be provided after 3 months from surgery.     Refills will not be given by the office during evening hours, or weekends.  To seek refills on pain medications during the post-operative period, you must call the office 48 hours in advance to request the refill. The office will then notify you when to  the prescription. DO NOT wait until you are out of the medication to request a refill.  They can not be \"called in\" to the pharmacy.      How to Wean Off Pain Medication:   As you begin to feel better, gradually wean off the narcotic pain medication and begin to use it only for breakthrough pain.  Gradually reduce the total number of pills you take each day.  This can be " done by taking fewer pills at a time or by increasing the amount of time between each pill.    For example, if you were taking 2 pills every 6 hours you would be taking a total of 8 pills per day.  Reduce this to 6 pills per day, then 4-5 and so on.  This can be done by taking 1 pill at a time instead of 2, or by taking the pills every 8 hours instead of every 6.    As you begin to wean from the narcotic pain medication, begin substituting with over the counter tylenol when you are not taking the narcotic.  Limit total tylenol dosage to less than 4 grams per day.    V. FOLLOW-UP VISITS:  You will need to follow up in the office with Dr. Michel at 3 weeks.  Please call [phone] if you need to confirm or reschedule your appointment time.   If you have any concerns or suspected complications prior to your follow up visit, please call your surgeons office. Do not wait until your appointment time if you suspect complications. These will need to be addressed in the office promptly.

## 2022-04-07 NOTE — PLAN OF CARE
Goal Outcome Evaluation:               POD # 1 TKA by Anand, dressing CDI, worked with PT, cleared for DC home with HH, patient up to chair, voiding per BRP, urgency noted, confusion overnight, pulled out IV, hx of short term memory loss, plan is for DC home today with HH, meds to bed, walker from [facility]

## 2022-04-07 NOTE — DISCHARGE SUMMARY
Discharge Summary    Date of Admission: 4/6/2022  6:06 AM  Date of Discharge:  4/7/2022    Discharge Diagnosis:   Total knee replacement status, left [Z96.652]      PMHX:   Past Medical History:   Diagnosis Date   • Cancer (HCC)     HIST OF CERVICAL   • Cataract    • Depression    • Falls frequently     R/T KNEE AND BALANCE ISSUE   • History of cervical cancer 1974   • History of hematuria    • History of shingles    • History of total knee arthroplasty, right 07/29/2020   • Internal hemorrhoids    • Left knee pain    • Memory loss     SHORT TERM   • Osteoarthritis    • Osteopenia of multiple sites    • Primary osteoarthritis of right knee    • Pseudodementia    • Urinary retention    • Varicose veins of legs        Discharge Disposition  Home-Health Care [facility]    Procedures Performed  Procedure(s):  LEFT TOTAL KNEE ARTHROPLASTY       Indication for Admission  Patient is a 77 y.o. female admitted after undergoing the above surgical procedure. They were admitted for post-operative pain control, medical management and physical therapy.  They progressed with physical therapy.   They were deemed stable for discharge.      Consults:   Consults     No orders found for last 30 day(s).          Discharge Instructions:  Patient is weight bearing as tolerated on the operative leg.  Patient is to progress range of motion and ambulation as tolerated.  Use walker as needed for stability and gait.  May progress to cane as tolerated.  The dressing should be left on knee until post-operative day 7, and then removed.  Dressing is waterproof. Patient may shower 3 days after the operation with the dressing in place. Replace the dressing if it becomes wet or saturated. Use compression stockings for leg swelling.  Patient will follow-up in the office in 3 weeks.  Call the office at [phone] for any questions or concerns.      Discharge Medications     Discharge Medications      New Medications      Instructions Start Date    aspirin 81 MG EC tablet   81 mg, Oral, Every 12 Hours Scheduled      HYDROcodone-acetaminophen 7.5-325 MG per tablet  Commonly known as: NORCO   1 tablet, Oral, Every 6 Hours PRN      meloxicam 15 MG tablet  Commonly known as: MOBIC   15 mg, Oral, Daily         Continue These Medications      Instructions Start Date   cholecalciferol 25 MCG (1000 UT) tablet  Commonly known as: VITAMIN D3   1 tablet, Oral, Daily      citalopram 20 MG tablet  Commonly known as: CeleXA   Take 1 tablet by mouth once daily      memantine 10 MG tablet  Commonly known as: NAMENDA   Take 1 tablet by mouth twice daily      OIL OF OREGANO PO   1 capsule, Oral, Daily, TO HOLD 1 WEEK BEFORE SURGERY      omeprazole 20 MG capsule  Commonly known as: priLOSEC   20 mg, Oral, Daily      PROBIOTIC-10 PO   1 capsule, Oral, Every Morning      vitamin B-12 100 MCG tablet  Commonly known as: CYANOCOBALAMIN   50 mcg, Oral, Daily         Stop These Medications    Chlorhexidine Gluconate 2 % pads            Discharge Diet: Regular diet    Activity at Discharge: Weight bearing as tolerated, activity as tolerated    Follow-up Appointments  Future Appointments   Date Time Provider Department Center   8/30/2022 10:30 AM Seipel, Joseph F, MD [facility]              04/07/22,  07:52 EDT    Panda Michel MD  Orthopaedic Surgeon

## 2022-04-07 NOTE — PROGRESS NOTES
Panda Michel MD     Orthopedic Progress Note    Subjective :   Patient is feeling well.  Pain is well controlled.  No complaints at the present time.  Has ambulated some.    Objective :    Vital signs in last 24 hours:  Temp:  [97 °F (36.1 °C)-98.3 °F (36.8 °C)] 98.3 °F (36.8 °C)  Heart Rate:  [55-70] 63  Resp:  [14-18] 18  BP: ()/(45-70) 108/64  Vitals:    04/06/22 1900 04/06/22 2300 04/07/22 0330 04/07/22 0700   BP: 93/52 99/52 97/54 108/64   BP Location: Left arm Left arm Right arm Right arm   Patient Position: Lying Lying Lying Lying   Pulse: 56 61 70 63   Resp: 16 16 16 18   Temp: 97.1 °F (36.2 °C) 97.6 °F (36.4 °C) 97.8 °F (36.6 °C) 98.3 °F (36.8 °C)   TempSrc: Oral Oral Oral Oral   SpO2:   98% 95%   Weight:       Height:           PHYSICAL EXAM:  Patient is calm, in no acute distress, awake and oriented x 3.  Dressing clean, dry and intact.  No signs of infection.  Swelling is appropriate.  Ecchymosis is appropriate in amount.  Homans test is negative.  Patient is neurovascularly intact distally.      LABS:  Results from last 7 days   Lab Units 04/07/22  0456   HEMOGLOBIN g/dL 9.9*   HEMATOCRIT % 31.0*     Results from last 7 days   Lab Units 04/07/22  0456   SODIUM mmol/L 139   POTASSIUM mmol/L 3.9   CHLORIDE mmol/L 107   CO2 mmol/L 24.5   BUN mg/dL 20   CREATININE mg/dL 0.55*   GLUCOSE mg/dL 115*   CALCIUM mg/dL 8.3*           Intake/Output                 04/06/22 0701 - 04/07/22 0700     [id number] 8517-6805 Total              Intake    P.O.  --  360 360    I.V.  1555  541.7 2096.7    IV Piggyback  200  100 300    Total Intake 1755 1001.7 2756.7       Output    Urine  750  575 1325    Blood  100  -- 100    Total Output            ASSESSMENT:  Status post left total knee arthroplasty postop day #1    Plan:  Continue Physical Therapy, weightbearing as tolerated and range of motion as tolerated to the left knee.  Continue SCDs, Continue aspirin 81 mg twice daily for DVT  prophylaxis.  Dispo planning for home with home health care today    Panda Michel MD    Date: 4/7/2022  Time: 07:49 EDT    Panda Michel MD  Orthopaedic Surgeon  [facility]

## 2022-04-07 NOTE — PLAN OF CARE
Goal Outcome Evaluation:  Plan of Care Reviewed With: patient, daughter        Progress: improving  Outcome Evaluation: Upon arrival pt supine in bed, oriented and agreeable to PT. Pt completed supine-sit w/ SBA and vcs for hand placement and sequencing. Pt able to maintain good static sitting balance at EOB w/o UE support. Pt completed sit-stand transfer w/ CGA and vcs for hand placement. Pt ambulated ~200' w/ FWW and CGA w/ pt exhibiting antalgic gait pattern. Pt completed 5 steps w/ CGA and vcs for sequencing. Pt engaged in BLE ther-ex for improved functional strength and increased activity tolerance. L knee ROM 0-100, PT will progress as pt tolerates.    Patient was intermittently wearing a face mask during this therapy encounter. Therapist used appropriate personal protective equipment including eye protection, mask, and gloves.  Mask used was standard procedure mask. Appropriate PPE was worn during the entire therapy session. Hand hygiene was completed before and after therapy session. Patient is not in enhanced droplet precautions.

## 2022-04-07 NOTE — PLAN OF CARE
Goal Outcome Evaluation:  Plan of Care Reviewed With: patient        Progress: no change  Outcome Evaluation: VSS, RA, IV pulled out, up with assist of 1, voiding per BRP, set bed alarm off multiple times, forgot she was in the hospital even, reorientated multiple times, educated on BP  monitoring and to call for assistance, home with HH on dc

## 2022-04-07 NOTE — THERAPY TREATMENT NOTE
Patient Name: Salud Gama  : 1945    MRN: [id number]                              Today's Date: 2022       Admit Date: 2022    Visit Dx:     ICD-10-CM ICD-9-CM   1. Total knee replacement status, left  Z96.652 V43.65   2. S/P TKR (total knee replacement), left  Z96.652 V43.65     Patient Active Problem List   Diagnosis   • History of malignant neoplasm of cervix   • Osteoarthritis   • Osteopenia of multiple sites   • Varicose veins of lower extremity   • Encounter for screening mammogram for malignant neoplasm of breast   • Encounter for colorectal cancer screening   • Medicare annual wellness visit, subsequent   • Screening for malignant neoplasm of cervix   • Total knee replacement status, right   • Dense breast   • Early onset Alzheimer's dementia without behavioral disturbance (HCC)   • Chest pain syndrome   • Depression   • Gastroesophageal reflux disease without esophagitis   • Total knee replacement status, left     Past Medical History:   Diagnosis Date   • Cancer (HCC)     HIST OF CERVICAL   • Cataract    • Depression    • Falls frequently     R/T KNEE AND BALANCE ISSUE   • History of cervical cancer    • History of hematuria    • History of shingles    • History of total knee arthroplasty, right 2020   • Internal hemorrhoids    • Left knee pain    • Memory loss     SHORT TERM   • Osteoarthritis    • Osteopenia of multiple sites    • Primary osteoarthritis of right knee    • Pseudodementia    • Urinary retention    • Varicose veins of legs      Past Surgical History:   Procedure Laterality Date   • CATARACT EXTRACTION, BILATERAL Bilateral    • CYSTOSCOPY      Dr. Ayers   • ENTEROCELE REPAIR  2013   • INCONTINENCE SURGERY  2013   • LIVER BIOPSY  2018    wedge   • TOTAL KNEE ARTHROPLASTY Right 2020    Procedure: RIGHT TOTAL KNEE ARTHROPLASTY;  Surgeon: Panda Michel MD;  Location: [facility];  Service: Orthopedics;  Laterality: Right;   • TOTAL  KNEE ARTHROPLASTY Left 4/6/2022    Procedure: LEFT TOTAL KNEE ARTHROPLASTY;  Surgeon: Panda Michel MD;  Location: [facility] MAIN OR;  Service: Orthopedics;  Laterality: Left;   • VAGINAL HYSTERECTOMY  1974    ovaries remain. Cervical cancer      General Information     Row Name 04/07/22 0838          Physical Therapy Time and Intention    Document Type therapy note (daily note)  -PH     Mode of Treatment physical therapy  -     Row Name 04/07/22 0838          General Information    Existing Precautions/Restrictions fall  -PH     Row Name 04/07/22 0838          Safety Issues, Functional Mobility    Comment, Safety Issues/Impairments (Mobility) Gait belt and nonskid socks worn for safety.  -PH           User Key  (r) = Recorded By, (t) = Taken By, (c) = Cosigned By    Initials Name Provider Type     Fatuma Nunez PTA Physical Therapist Assistant               Mobility     Row Name 04/07/22 0844          Bed Mobility    Bed Mobility supine-sit  -PH     Supine-Sit [place] (Bed Mobility) standby assist;verbal cues;1 person assist  -PH     Sit-Supine [place] (Bed Mobility) not tested  -PH     Assistive Device (Bed Mobility) bed rails  -PH     Comment, (Bed Mobility) Vcs for use of bed rail and sequencing.  -PH     Row Name 04/07/22 0844          Bed-Chair Transfer    Bed-Chair [place] (Transfers) not tested  -PH     Row Name 04/07/22 0844          Sit-Stand Transfer    Sit-Stand [place] (Transfers) contact guard;verbal cues;1 person assist  -PH     Assistive Device (Sit-Stand Transfers) walker, 4-wheeled  -PH     Comment, (Sit-Stand Transfer) Vcs for hand placement and sequencing.  -PH     Row Name 04/07/22 0844          Gait/Stairs (Locomotion)    [place] Level (Gait) contact guard;1 person assist  -PH     Assistive Device (Gait) walker, front-wheeled  -PH     Distance in Feet (Gait) 200'  -PH     Deviations/Abnormal Patterns (Gait) left sided deviations;antalgic;base of support,  narrow;gait speed decreased;stride length decreased  -PH     Left Sided Gait Deviations decreased knee extension;forward flexed posture;heel strike decreased  -PH     [place] Level (Stairs) contact guard;1 person assist;verbal cues  -PH     Handrail Location (Stairs) left side (ascending);right side (descending)  -PH     Number of Steps (Stairs) 5 steps completed  -PH     Ascending Technique (Stairs) step-to-step  -PH     Descending Technique (Stairs) step-to-step  -PH     Stairs, Safety Issues weight-shifting ability decreased  -PH     Stairs, Impairments ROM decreased;strength decreased;impaired balance;pain  -PH     Comment, (Gait/Stairs) Vcs for sequencing. Left lean noted w/ daughter stating pt had leg length discrepency at baseline. No overt safety issues w/ stairs or gait.  -     Row Name 04/07/22 0844          Mobility    Extremity Weight-bearing Status left lower extremity  -PH     Left Lower Extremity (Weight-bearing Status) weight-bearing as tolerated (WBAT)  -           User Key  (r) = Recorded By, (t) = Taken By, (c) = Cosigned By    Initials Name Provider Type     Fatuma Nunez PTA Physical Therapist Assistant               Obj/Interventions     Row Name 04/07/22 0839          Range of Motion Comprehensive    Comment, General Range of Motion L knee 0-100  -     Row Name 04/07/22 0839          Motor Skills    Therapeutic Exercise other (see comments)  LLE AP, SAQ, SLR, Heel slide, QS x 10  -PH     Row Name 04/07/22 0839          Balance    Balance Assessment sitting static balance  -PH     Static Sitting Balance standby assist  -PH     Comment, Balance Pt able to maintain good static sitting balance at EOB prior to Sit-stand transfer.  -           User Key  (r) = Recorded By, (t) = Taken By, (c) = Cosigned By    Initials Name Provider Type     Fatuma Nunez PTA Physical Therapist Assistant               Goals/Plan    No documentation.                Clinical  Impression     Row Name 04/07/22 0851          Pain    Pretreatment Pain Rating 4/10  -PH     Posttreatment Pain Rating 4/10  -PH     Pain Location - Side/Orientation Left  -PH     Pain Location - knee  -PH     Pain Intervention(s) Cold applied;Repositioned;Ambulation/increased activity;Rest  -PH     Additional Documentation Pain Scale: Numbers Pre/Post-Treatment (Group)  -PH     Row Name 04/07/22 0851          Plan of Care Review    Plan of Care Reviewed With patient;daughter  -PH     Progress improving  -PH     Outcome Evaluation Upon arrival pt supine in bed, oriented and agreeable to PT. Pt completed supine-sit w/ SBA and vcs for hand placement and sequencing. Pt able to maintain good static sitting balance at EOB w/o UE support. Pt completed sit-stand transfer w/ CGA and vcs for hand placement. Pt ambulated ~200' w/ FWW and CGA w/ pt exhibiting antalgic gait pattern. Pt educated and completed 5 steps w/ CGA. Pt engaged in LLE ther-ex for improved functional strength and increased activity tolerance. L knee ROM 0-100, PT will progress as pt tolerates. Pt planning on DC to home w/ assist of family and HHPT.  -PH     Row Name 04/07/22 0851          Positioning and Restraints    Pre-Treatment Position in bed  -PH     Post Treatment Position chair  -PH     In Chair reclined;call light within reach;encouraged to call for assist;exit alarm on;with family/caregiver  -PH           User Key  (r) = Recorded By, (t) = Taken By, (c) = Cosigned By    Initials Name Provider Type    PH Fatuma Nunez PTA Physical Therapist Assistant               Outcome Measures     Row Name 04/07/22 0859          How much help from another person do you currently need...    Turning from your back to your side while in flat bed without using bedrails? 4  -PH     Moving from lying on back to sitting on the side of a flat bed without bedrails? 3  -PH     Moving to and from a bed to a chair (including a wheelchair)? 3  -PH     Standing  up from a chair using your arms (e.g., wheelchair, bedside chair)? 3  -PH     Climbing 3-5 steps with a railing? 3  -PH     To walk in hospital room? 3  -PH     AM-PAC 6 Clicks Score (PT) 19  -PH     Row Name 04/07/22 0859          Functional Assessment    Outcome Measure Options AM-PAC 6 Clicks Basic Mobility (PT)  -PH           User Key  (r) = Recorded By, (t) = Taken By, (c) = Cosigned By    Initials Name Provider Type    PH Fatuma Nunez PTA Physical Therapist Assistant                             Physical Therapy Education                 Title: PT OT SLP Therapies (Done)     Topic: Physical Therapy (Done)     Point: Mobility training (Done)     Learning Progress Summary           Patient Acceptance, E,TB, VU,NR,DU by NL at 4/7/2022 0904    Acceptance, E,TB, VU,NR,DU by PH at 4/7/2022 0900    Acceptance, E,D, VU,NR by MS at 4/6/2022 1549   Family Acceptance, E,TB, VU,NR,DU by  at 4/7/2022 0900                   Point: Home exercise program (Done)     Learning Progress Summary           Patient Acceptance, E,TB, VU,NR,DU by NL at 4/7/2022 0904    Acceptance, E,TB, VU,NR,DU by PH at 4/7/2022 0900    Acceptance, E,D, VU,NR by MS at 4/6/2022 1549   Family Acceptance, E,TB, VU,NR,DU by PH at 4/7/2022 0900                   Point: Body mechanics (Done)     Learning Progress Summary           Patient Acceptance, E,TB, VU,NR,DU by NL at 4/7/2022 0904    Acceptance, E,TB, VU,NR,DU by PH at 4/7/2022 0900    Acceptance, E,D, VU,NR by MS at 4/6/2022 1549   Family Acceptance, E,TB, VU,NR,DU by  at 4/7/2022 0900                   Point: Precautions (Done)     Learning Progress Summary           Patient Acceptance, E,TB, VU,NR,DU by NL at 4/7/2022 0904    Acceptance, E,TB, VU,NR,DU by PH at 4/7/2022 0900    Acceptance, E,D, VU,NR by MS at 4/6/2022 1549   Family Acceptance, E,TB, YASSINE,NR,DU by PH at 4/7/2022 0900                               User Key     Initials Effective Dates Name Provider Type Discipline    NL  06/16/21 -  Radha Vaughan RN Registered Nurse Nurse    MS 06/16/21 -  Mannie Rivera, PT Physical Therapist PT     06/16/21 -  Fatuma Nunez PTA Physical Therapist Assistant PT              PT Recommendation and Plan     Plan of Care Reviewed With: patient, daughter  Progress: improving  Outcome Evaluation: Upon arrival pt supine in bed, oriented and agreeable to PT. Pt completed supine-sit w/ SBA and vcs for hand placement and sequencing. Pt able to maintain good static sitting balance at EOB w/o UE support. Pt completed sit-stand transfer w/ CGA and vcs for hand placement. Pt ambulated ~200' w/ FWW and CGA w/ pt exhibiting antalgic gait pattern. Pt educated and completed 5 steps w/ CGA. Pt engaged in LLE ther-ex for improved functional strength and increased activity tolerance. L knee ROM 0-100, PT will progress as pt tolerates. Pt planning on DC to home w/ assist of family and HHPT.     Time Calculation:    PT Charges     Row Name 04/07/22 0903             Time Calculation    Start Time 0812  -PH      Stop Time 0835  -PH      Time Calculation (min) 23 min  -PH      PT Received On 04/07/22  -PH      PT - Next Appointment 04/08/22  -PH              Timed Charges    20694 - PT Therapeutic Exercise Minutes 10  -PH      78033 - PT Therapeutic Activity Minutes 13  -PH              Total Minutes    Timed Charges Total Minutes 23  -PH       Total Minutes 23  -PH            User Key  (r) = Recorded By, (t) = Taken By, (c) = Cosigned By    Initials Name Provider Type    PH Fatuma Nunez PTA Physical Therapist Assistant              Therapy Charges for Today     Code Description Service Date Service Provider Modifiers Qty    04411932386 HC PT THER PROC EA 15 MIN 4/7/2022 Fatuma Nunez PTA GP 1    68673121658 HC PT THERAPEUTIC ACT EA 15 MIN 4/7/2022 Fatuma Nunez PTA GP 1          PT G-Codes  Outcome Measure Options: AM-PAC 6 Clicks Basic Mobility (PT)  AM-PAC 6 Clicks Score  (PT): 19    Fatuma Nunez, PTA  4/7/2022

## 2022-04-07 NOTE — OUTREACH NOTE
Prep Survey    Flowsheet Row Responses   [facility] patient discharged from? [place]   Is LACE score < 7 ? Yes   Emergency Room discharge w/ pulse ox? No   Eligibility [facility]   Date of Admission 04/06/22   Date of Discharge 04/07/22   Discharge Disposition Home or Self Care   Discharge diagnosis LEFT TOTAL KNEE ARTHROPLASTY   Does the patient have one of the following disease processes/diagnoses(primary or secondary)? Total Joint Replacement   Does the patient have Home health ordered? Yes   What is the Home health agency?  [facility]   Is there a DME ordered? No   Prep survey completed? Yes          JOCELYN ACOSTA - Registered Nurse

## 2022-04-08 ENCOUNTER — HOME CARE VISIT (OUTPATIENT)
Dept: HOME HEALTH SERVICES | Facility: HOME HEALTHCARE | Age: 77
End: 2022-04-08

## 2022-04-08 ENCOUNTER — TRANSITIONAL CARE MANAGEMENT TELEPHONE ENCOUNTER (OUTPATIENT)
Dept: CALL CENTER | Facility: HOSPITAL | Age: 77
End: 2022-04-08

## 2022-04-08 VITALS
SYSTOLIC BLOOD PRESSURE: 124 MMHG | HEART RATE: 80 BPM | OXYGEN SATURATION: 94 % | TEMPERATURE: 98.4 F | RESPIRATION RATE: 18 BRPM | DIASTOLIC BLOOD PRESSURE: 70 MMHG

## 2022-04-08 DIAGNOSIS — K21.9 GASTROESOPHAGEAL REFLUX DISEASE WITHOUT ESOPHAGITIS: ICD-10-CM

## 2022-04-08 PROCEDURE — G0151 HHCP-SERV OF PT,EA 15 MIN: HCPCS

## 2022-04-08 RX ORDER — OMEPRAZOLE 20 MG/1
CAPSULE, DELAYED RELEASE ORAL
Qty: 30 CAPSULE | Refills: 12 | Status: SHIPPED | OUTPATIENT
Start: 2022-04-08 | End: 2022-06-13 | Stop reason: SDUPTHER

## 2022-04-08 NOTE — HOME HEALTH
Pt is a 78 yo female referred for PT sp L TKR. Pt has non removable dressing with no stains noted. Dressing scheduled to be removed 4.13.22.     Environment Lives with spouse in single story house with 2 steps with one rail to get in and out thru garage. House is clutter free. Daughter assisting patient at home at this time. Pt stays on main floor and able to access all areas with use of wheeled walker. Pt uses a raised toilet and a walk in shower with shower chair and hand held shower head.    Educated on contents of agency folder and booklet. Explained to patient/caregiver orientation book:   How to contact home health after hours, community resources, supervisor and 's name   and phone numbers, patient rights and responsibilities, home safety, disaster planning,   cost and insurance explanation,  how to report issues of complaints.   The patient 's insurance will cover 100% of cost if she is homebound, under the care of and MD and   requires the skill of a PT and with authorization.COVID 19 hand outs. Pt and caregiver stated they understood.    Plan for next visit progress HEP as tolerated, transfer training balance and gait training.     Pt has current flu vaccination.

## 2022-04-08 NOTE — OUTREACH NOTE
Call Center TCM Note    Flowsheet Row Responses   [facility] patient discharged from? [place]   Does the patient have one of the following disease processes/diagnoses(primary or secondary)? Total Joint Replacement   Joint surgery performed? Knee   TCM attempt successful? Yes   Call start time 1023   Call end time 1031   Discharge diagnosis LEFT TOTAL KNEE ARTHROPLASTY   Person spoke with today (if not patient) and relationship Miranda,daughter   Does the patient have all medications related to this admission filled (includes all antibiotics, pain medications, etc.) Yes   Is the patient taking all medications as directed (includes completed medication regime)? Yes   Is the patient able to teach back alternate methods of pain control? Ice, Reposition, Short, frequent activity   Does the patient have a follow up appointment with their surgeon? Yes  [4/26/22]   Has the patient kept scheduled appointments due by today? N/A   Comments Declines PCP f/u at this time   What is the Home health agency?  [facility]   Has home health visited the patient within 72 hours of discharge? Yes   Psychosocial issues? No   When is the first therapy visit scheduled (PO Day) including how many days per week  4/8/22   Has the patient began therapy sessions (either in the home or as an out patient)? Yes   Does the patient have a wound vac in place? No   Has the patient fallen since discharge? No   Comments Patient using a walker--dtr offering reminders to use   Did the patient receive a copy of their discharge instructions? Yes   Nursing interventions Reviewed instructions with patient   What is the patient's perception of their functional status since discharge? Improving  [Dtr reports P.T. has visited and reports edema decreased, pain adequately managed with minimal pain medications, no BM yet and dtr to start a bowel regimen (also encouraged po nutrition).  Patient ambulating well with walker. ]   Is the patient able to teach back  signs and symptoms of infection? Temp >100.4 for 24h or longer, Incisional drainage, Blisters around incision, Severe discomfort or pain, Increased swelling or redness around incision (not associated with surgical edema)  [Encouraged daughter to refer back to AVS for instructions]   Is the patient able to teach back how to prevent infection? No tub baths, hot tub or swimming  [Daughter reports that P.T. reinforced compression stocking and reports no drainage noted to dressing--reviewed orders for dressing changes ]   Is the patient able to teach back signs and symptoms of DVT? Redness in calf, Severe pain in calf, Swelling in calf, Shortness of breath or chest pain, Area hot to touch   If the patient is a current smoker, are they able to teach back resources for cessation? Not a smoker   Is the patient/caregiver able to teach back the hierarchy of who to call/visit for symptoms/problems? PCP, Specialist, Home health nurse, Urgent Care, ED, 911 Yes   Additional teach back comments Encouraged use of I.S. and also reviewed s/s of DVT (ASA ordered).   TCM call completed? Yes   Wrap up additional comments Dtr seems confident with care as this is patient's second knee replacement but understands to call surgeon with any concerns.          Tami Liu RN    4/8/2022, 10:39 EDT

## 2022-04-08 NOTE — CASE MANAGEMENT/SOCIAL WORK
Case Management Discharge Note      Final Note: Home with [facility]         Selected Continued Care - Discharged on 4/7/2022 Admission date: 4/6/2022 - Discharge disposition: Home-Health Care Svc    Destination    No services have been selected for the patient.              Durable Medical Equipment    No services have been selected for the patient.              Dialysis/Infusion    No services have been selected for the patient.              Home Medical Care Coordination complete.    Service Provider Selected Services Address Phone Fax Patient Preferred    [facility]  [facility] [address] [facility] [address] [phone] --          Therapy    No services have been selected for the patient.              Community Resources    No services have been selected for the patient.              Community & DME    No services have been selected for the patient.                  Transportation Services  Private: Car    Final Discharge Disposition Code: 06 - home with home health care

## 2022-04-11 ENCOUNTER — HOME CARE VISIT (OUTPATIENT)
Dept: HOME HEALTH SERVICES | Facility: HOME HEALTHCARE | Age: 77
End: 2022-04-11

## 2022-04-11 VITALS
HEART RATE: 83 BPM | RESPIRATION RATE: 16 BRPM | OXYGEN SATURATION: 97 % | TEMPERATURE: 97.8 F | DIASTOLIC BLOOD PRESSURE: 72 MMHG | SYSTOLIC BLOOD PRESSURE: 138 MMHG

## 2022-04-11 PROCEDURE — G0157 HHC PT ASSISTANT EA 15: HCPCS

## 2022-04-11 NOTE — HOME HEALTH
pt up and prepared for PT session and reports she is doing well but with continued L knee pain.  pt states she is motivated to improve and wants to return to plf.     pts daughters present today and assisting with all activities.  incision covered with ace wrap and dressing,  ace removed and with very minimal drainage on the dressing.   moderate redness at L lateral knee but not concerning today     pt was guarded of the L knee and with noted weakness and rom deficits.  pt was educated today on proper form with hep review with instruction needed to fully contract/hold at end rom.     needed continuous cues to remain on task today, family was present and educated on the activities performed.   pt was educated today on proper elevation of LEs, use of ice and ace wrap.

## 2022-04-13 ENCOUNTER — HOME CARE VISIT (OUTPATIENT)
Dept: HOME HEALTH SERVICES | Facility: HOME HEALTHCARE | Age: 77
End: 2022-04-13

## 2022-04-13 VITALS
RESPIRATION RATE: 16 BRPM | DIASTOLIC BLOOD PRESSURE: 66 MMHG | SYSTOLIC BLOOD PRESSURE: 134 MMHG | TEMPERATURE: 97.8 F | HEART RATE: 78 BPM | OXYGEN SATURATION: 96 %

## 2022-04-13 PROCEDURE — G0157 HHC PT ASSISTANT EA 15: HCPCS

## 2022-04-14 NOTE — HOME HEALTH
pt up and prepared for PT session with no c/o pain.   pt is avoiding use of pain meds as able.  pt is motivated to improve and daughter reports pt is improving and has been attempting hep as able     pt was compliant throughout but needed cues to remain on task and to perform the activities correctly.    pt often needed instruction to slow mar and to sustain contraction at end rom as able.      dressing removed per MD orders,   incision clean and dry with no noted issues.   pt was advised to ice/elevate following PT session.

## 2022-04-15 ENCOUNTER — HOME CARE VISIT (OUTPATIENT)
Dept: HOME HEALTH SERVICES | Facility: HOME HEALTHCARE | Age: 77
End: 2022-04-15

## 2022-04-15 ENCOUNTER — TELEPHONE (OUTPATIENT)
Dept: ORTHOPEDIC SURGERY | Facility: HOSPITAL | Age: 77
End: 2022-04-15

## 2022-04-15 VITALS
OXYGEN SATURATION: 93 % | DIASTOLIC BLOOD PRESSURE: 80 MMHG | TEMPERATURE: 98 F | HEART RATE: 96 BPM | SYSTOLIC BLOOD PRESSURE: 120 MMHG

## 2022-04-15 PROCEDURE — G0151 HHCP-SERV OF PT,EA 15 MIN: HCPCS

## 2022-04-15 NOTE — HOME HEALTH
No new issues voiced.  Daughters taking turns assisting patient at home.     Plan for next visit ROM and strengthening, transfer training balance and gait training.

## 2022-04-15 NOTE — TELEPHONE ENCOUNTER
Attempted to speak with Ms. Gama to see how she is doing as she is 2 weeks SP LTK. Message left, awaiting call back.     4/18/2022@ 3853  Received a voice mail from Ms. Gama's daughter She said she is doing well. She is able to complete the exercises working and progressing with her PT. She is off the pain medications and doing Tylenol as needed. She continues to use her ice and IS. She has taken a few showers and done well. Her incision looks good. Her daughter feels she is doing fine and progressing as to be expected. She has my contact information should they need anything.

## 2022-04-18 ENCOUNTER — HOME CARE VISIT (OUTPATIENT)
Dept: HOME HEALTH SERVICES | Facility: HOME HEALTHCARE | Age: 77
End: 2022-04-18

## 2022-04-18 VITALS
HEART RATE: 82 BPM | RESPIRATION RATE: 15 BRPM | SYSTOLIC BLOOD PRESSURE: 124 MMHG | OXYGEN SATURATION: 97 % | DIASTOLIC BLOOD PRESSURE: 68 MMHG | TEMPERATURE: 97.8 F

## 2022-04-18 PROCEDURE — G0157 HHC PT ASSISTANT EA 15: HCPCS

## 2022-04-19 NOTE — HOME HEALTH
pt sitting up and is prepared for PT session,  pt states she has been performing hep as tolerated but with weakness and fatigue.   pt states she has been attempting hep as able but with limitations and ongoing weakness adn rom deficits.      pt was compliant throughout PT session but with noted weakness and functional limitations.    pt was advised to maintain proper form with hep review,   needed instruction to avoid substitution.   L knee strength and rom are improving. pt is ambulating better with improving L knee flexion/extension.    pt was encouraged to ice/elevate LLE following PT session

## 2022-04-20 ENCOUNTER — HOME CARE VISIT (OUTPATIENT)
Dept: HOME HEALTH SERVICES | Facility: HOME HEALTHCARE | Age: 77
End: 2022-04-20

## 2022-04-20 VITALS
SYSTOLIC BLOOD PRESSURE: 124 MMHG | TEMPERATURE: 97.9 F | HEART RATE: 78 BPM | RESPIRATION RATE: 17 BRPM | DIASTOLIC BLOOD PRESSURE: 68 MMHG | OXYGEN SATURATION: 95 %

## 2022-04-20 PROCEDURE — G0157 HHC PT ASSISTANT EA 15: HCPCS

## 2022-04-21 NOTE — HOME HEALTH
pt sitting up and is ready for PT session. pt states she has been performing hep as tolerated but with weakness and pain with flexion activities      pt continues to attempt to avoid use of pain meds and is using ice with elevation several times daily.      daughter present and is assisting as able       pt was compliant throughout PT session and is tolerating PT better with less pain and improving knee rom.    pt was better able to wb throught the LLE and rom during gait trg is improving.  pt was encouraged to ice/elevate LLE following PT session and is agreeable to this.    pt fatigued during gait trg and eventually needed to sit.         incision clean and dry with no noted drainage or other issues

## 2022-04-22 ENCOUNTER — HOME CARE VISIT (OUTPATIENT)
Dept: HOME HEALTH SERVICES | Facility: HOME HEALTHCARE | Age: 77
End: 2022-04-22

## 2022-04-22 PROCEDURE — G0157 HHC PT ASSISTANT EA 15: HCPCS

## 2022-04-24 VITALS
OXYGEN SATURATION: 97 % | RESPIRATION RATE: 15 BRPM | HEART RATE: 72 BPM | SYSTOLIC BLOOD PRESSURE: 128 MMHG | DIASTOLIC BLOOD PRESSURE: 68 MMHG | TEMPERATURE: 97.2 F

## 2022-04-25 ENCOUNTER — HOME CARE VISIT (OUTPATIENT)
Dept: HOME HEALTH SERVICES | Facility: HOME HEALTHCARE | Age: 77
End: 2022-04-25

## 2022-04-25 VITALS
DIASTOLIC BLOOD PRESSURE: 70 MMHG | RESPIRATION RATE: 17 BRPM | SYSTOLIC BLOOD PRESSURE: 134 MMHG | HEART RATE: 75 BPM | TEMPERATURE: 97.8 F | OXYGEN SATURATION: 96 %

## 2022-04-25 PROCEDURE — G0157 HHC PT ASSISTANT EA 15: HCPCS

## 2022-04-25 NOTE — HOME HEALTH
pt sitting up and is feeling better today.  pt reporting continued L knee pain but currently well managed.   pt states she has been performing hep daily but with continued limitations.    daughter present and continues to assist as needed.      pt was compliant throughout PT session but still guarded of the L Knee with weakness and fatigue   pt was educated on proper hip position with hep review and to avoid compensatory movement.  pt still guarded of the LLE with wb deficits.   rom and strength are slowly improving at this time.

## 2022-04-26 ENCOUNTER — HOME CARE VISIT (OUTPATIENT)
Dept: HOME HEALTH SERVICES | Facility: HOME HEALTHCARE | Age: 77
End: 2022-04-26

## 2022-04-26 NOTE — HOME HEALTH
pt sitting up with no new c/o's.   pt has continued L knee soreness at times but reports this is improving.  pt has f/u with the surgeon tomorrow.    incision clean and dry with no noted issues.    pt reports she has been attempting hep review as able.       pt was limited today in all areas but was able to perform the requested activities.  pt needed cues to properly perform ther ex,  with cues needed to properly and fully contract/hold.    began gait trg with the cane today,  instruction needed for cane placement/sequencing and to heel strike as able.      pt was fatigued to end PT session but was pleased to have participated.

## 2022-04-27 ENCOUNTER — HOME CARE VISIT (OUTPATIENT)
Dept: HOME HEALTH SERVICES | Facility: HOME HEALTHCARE | Age: 77
End: 2022-04-27

## 2022-04-27 VITALS
TEMPERATURE: 97.8 F | DIASTOLIC BLOOD PRESSURE: 68 MMHG | HEART RATE: 72 BPM | RESPIRATION RATE: 15 BRPM | SYSTOLIC BLOOD PRESSURE: 124 MMHG | OXYGEN SATURATION: 96 %

## 2022-04-27 PROCEDURE — G0157 HHC PT ASSISTANT EA 15: HCPCS

## 2022-04-28 NOTE — HOME HEALTH
pt up and ready for PT session,    pt states she has been performing hep as tolerated,  daughter present and reports pt was seen by ortho yesterday and reports satisfactory progress.   pt states she is motivated to improve and return to plf.        pt was compliant throughout PT session but struggled at times with noted weakness in the L quads with continued L knee flex/extension deficits.   pt continues to guard the LLE during gait trg and wb activities with noted weakness and mild soreness.   pt was challenged today to ambulate long distance,   eventually fatigued and needed to sit.  pt was minmally unsteady upon standing but was able to self correct.    pt continued to need cues/instruction to properly flex/extend the L knee     pt reported mild pain increases.

## 2022-04-29 ENCOUNTER — HOME CARE VISIT (OUTPATIENT)
Dept: HOME HEALTH SERVICES | Facility: HOME HEALTHCARE | Age: 77
End: 2022-04-29

## 2022-04-29 VITALS
HEART RATE: 68 BPM | OXYGEN SATURATION: 98 % | DIASTOLIC BLOOD PRESSURE: 72 MMHG | SYSTOLIC BLOOD PRESSURE: 106 MMHG | TEMPERATURE: 98.1 F

## 2022-04-29 PROCEDURE — G0151 HHCP-SERV OF PT,EA 15 MIN: HCPCS

## 2022-05-02 RX ORDER — MEMANTINE HYDROCHLORIDE 10 MG/1
TABLET ORAL
Qty: 60 TABLET | Refills: 5 | Status: SHIPPED | OUTPATIENT
Start: 2022-05-02 | End: 2022-08-30 | Stop reason: SDUPTHER

## 2022-05-04 ENCOUNTER — TREATMENT (OUTPATIENT)
Dept: PHYSICAL THERAPY | Facility: CLINIC | Age: 77
End: 2022-05-04

## 2022-05-04 DIAGNOSIS — R26.2 DIFFICULTY WALKING: ICD-10-CM

## 2022-05-04 DIAGNOSIS — Z96.652 HISTORY OF LEFT KNEE REPLACEMENT: Primary | ICD-10-CM

## 2022-05-04 DIAGNOSIS — M25.662 KNEE STIFFNESS, LEFT: ICD-10-CM

## 2022-05-04 DIAGNOSIS — R26.81 UNSTEADY GAIT WHEN WALKING: ICD-10-CM

## 2022-05-04 DIAGNOSIS — M25.562 CHRONIC PAIN OF LEFT KNEE: ICD-10-CM

## 2022-05-04 DIAGNOSIS — Z96.652 S/P TKR (TOTAL KNEE REPLACEMENT), LEFT: ICD-10-CM

## 2022-05-04 DIAGNOSIS — M62.58 MUSCLE ATROPHY OF LOWER EXTREMITY: ICD-10-CM

## 2022-05-04 DIAGNOSIS — G89.29 CHRONIC PAIN OF LEFT KNEE: ICD-10-CM

## 2022-05-04 PROCEDURE — 97140 MANUAL THERAPY 1/> REGIONS: CPT | Performed by: PHYSICAL THERAPIST

## 2022-05-04 PROCEDURE — 97110 THERAPEUTIC EXERCISES: CPT | Performed by: PHYSICAL THERAPIST

## 2022-05-04 PROCEDURE — 97163 PT EVAL HIGH COMPLEX 45 MIN: CPT | Performed by: PHYSICAL THERAPIST

## 2022-05-04 NOTE — PROGRESS NOTES
Physical Therapy Initial Evaluation and Plan of Care    Patient: Salud Gama   : 1945  Diagnosis/ICD-10 Code:  Z96.652 s/p L total knee replacement  Referring practitioner: Panda Michel MD  Date of Initial Visit: 2022  Today's Date: 2022  Patient seen for 1 sessions           Subjective Questionnaire: Oxford Knee raw score 31/48      Subjective Evaluation    History of Present Illness  Date of surgery: 2022  Mechanism of injury: Salud's daughter is present for evaluation. Per dtr, she's getting along fairly well. She's amb w/SPC but doesn't always use it, carries it. She completed home health PT last Friday. 2 steps to enter house with hand rail, no significant limitation. She has basement but doesn't go down regularly. L knee pain was chronic. Pt has hx of leg length discrepancy, R>L resulting in chronic frontal plane sway.   Pain has been mild.     LTKA  22  RTKA 20  Hx of dementia    Pain  Current pain ratin  At best pain ratin  At worst pain ratin    Social Support  Lives in: multiple-level home  Lives with: spouse    Diagnostic Tests  X-ray: abnormal    Treatments  Previous treatment: injection treatment  Current treatment: physical therapy  Discharged from (in last 30 days): home health care  Patient Goals  Patient goals for therapy: decreased edema, decreased pain, improved balance, increased motion, increased strength, independence with ADLs/IADLs and return to sport/leisure activities             Objective          Observations   Left Knee   Positive for incision.     Additional Knee Observation Details  Adhesive closure, no indication of infection.     Active Range of Motion   Left Knee   Flexion: 96 degrees     Right Knee   Flexion: 103 degrees     Additional Active Range of Motion Details  Lacking 2 deg extension on R, lacking 10 deg on L    Strength/Myotome Testing     Left Knee   Flexion: 3+  Extension: 3+  Quadriceps contraction: fair    Right Knee    Normal strength  Quadriceps contraction: good    Additional Strength Details  30s STS, BUE req'd: 9x from standard chair  TUs, BUE to rise, no AD          Assessment & Plan     Assessment  Impairments: abnormal gait, abnormal muscle firing, abnormal muscle tone, abnormal or restricted ROM, activity intolerance, impaired balance, impaired physical strength, lacks appropriate home exercise program, pain with function, safety issue and weight-bearing intolerance  Functional Limitations: carrying objects, lifting, sleeping, walking, uncomfortable because of pain, moving in bed, sitting and standing  Assessment details: Pt presents to OP PT s/p LTKA. DOS: 22. LEIDA: worsening pain and dysfunction d/t OA, failed conservative tx. Pt presents with dec'd L knee AROM, quad & glute strength, as well as abnormal & unsteady gait consistent with po status. Salud completed home health PT last Friday. Pt is appropriate candidate for OP PT and will benefit from skilled services.  Prognosis: good    Goals  Plan Goals: ST. Pt will demonstrate at least 100 deg R knee flexion actively to normalize gait in 2 wks.  2. Pt will ambulate with normalized gait pattern using  SPC x 150' for safe short community negotiation in 2 wks  3. Pt will demonstrate no greater than 5 deg R extensor lag to reduce feeling of giving way and improve safety.  4. Pt will demonstrate rise from standard chair using unilat UE support x 5 to reflect improved functional strength.     LT. Pt will demonstrate at least 120 deg L knee flexion actively for normalized gait pattern by DC.  2.Pt will ambulate indep, w/normalized gait pattern x at least 500' for safe short community distances.  3. Pt will demonstrate 0 deg extensor lag for normalized gait pattern & functional strength  4. Pt will demonstrate at least 3 STS without UE support to reflect improved LE strength and dynamic stability.   5. Pt will improve Oxford Knee score from  to at  least 38/48    Plan  Therapy options: will be seen for skilled therapy services  Planned modality interventions: cryotherapy, high voltage pulsed current (pain management), electrical stimulation/Russian stimulation and TENS  Planned therapy interventions: ADL retraining, balance/weight-bearing training, body mechanics training, functional ROM exercises, gait training, home exercise program, IADL retraining, joint mobilization, manual therapy, neuromuscular re-education, postural training, soft tissue mobilization, motor coordination training, strengthening, stretching and therapeutic activities  Frequency: 2x week  Duration in weeks: 12  Plan details: Will reduce frequency based on pt progress e.g. AROM at least 120 deg        SCT: Pt was provided with a hard copy of the HEP and instructed in use of it and all listed exercises.  Pt was instructed to cease any exercise that was painful, or that feels as though the form is incorrect.  Pt will return with any questions or difficulty at next session.  Pt acknowledged these instructions and agreed. View at [email or web address] using code: [email or web address] (see flowsheet)    Timed:         Manual Therapy:    8     mins  32296;     Therapeutic Exercise:    10     mins  65450;     Neuromuscular Cordell:        mins  67458;    Therapeutic Activity:          mins  24198;     Gait Training:           mins  87939;     Ultrasound:          mins  27054;    Self-care  __5__ mins 09317    Un-Timed:  Electrical Stimulation:         mins  35437 ( );  Dry Needling          mins self-pay 00983  Traction          mins 48656  Low Eval          mins  86510  Mod Eval          mins  40455  High Eval                        20    mins  72036  Canal repositioning ___  mins  04562        Timed Treatment:   23   mins   Total Treatment:     43   mins    PT SIGNATURE: Cherie Brown PT, DPT, cert. DN  [place] license # [id number]  Electronically signed by Cherie Brown PT, 05/04/22, 2:58 PM  EDT    Initial Certification  Certification Period: 5/4/2022 through 8/1/2022  I certify that the therapy services are furnished while this patient is under my care.  The services outlined above are required by this patient, and will be reviewed every 90 days.     PHYSICIAN: Panda Michel MD    NPI: [id number]                                       DATE: ______________________________________________________________________________________________     Please sign and return via fax to [phone]. Thank you, [facility].

## 2022-05-18 ENCOUNTER — TREATMENT (OUTPATIENT)
Dept: PHYSICAL THERAPY | Facility: CLINIC | Age: 77
End: 2022-05-18

## 2022-05-18 DIAGNOSIS — R26.2 DIFFICULTY WALKING: ICD-10-CM

## 2022-05-18 DIAGNOSIS — M25.562 CHRONIC PAIN OF BOTH KNEES: ICD-10-CM

## 2022-05-18 DIAGNOSIS — M25.561 CHRONIC PAIN OF BOTH KNEES: ICD-10-CM

## 2022-05-18 DIAGNOSIS — R53.1 WEAKNESS: ICD-10-CM

## 2022-05-18 DIAGNOSIS — M25.562 CHRONIC PAIN OF LEFT KNEE: ICD-10-CM

## 2022-05-18 DIAGNOSIS — Z96.652 HISTORY OF LEFT KNEE REPLACEMENT: Primary | ICD-10-CM

## 2022-05-18 DIAGNOSIS — Z96.652 S/P TKR (TOTAL KNEE REPLACEMENT), LEFT: ICD-10-CM

## 2022-05-18 DIAGNOSIS — R26.81 UNSTEADY GAIT WHEN WALKING: ICD-10-CM

## 2022-05-18 DIAGNOSIS — M62.58 MUSCLE ATROPHY OF LOWER EXTREMITY: ICD-10-CM

## 2022-05-18 DIAGNOSIS — G89.29 CHRONIC PAIN OF BOTH KNEES: ICD-10-CM

## 2022-05-18 DIAGNOSIS — Z96.651 HISTORY OF TOTAL KNEE ARTHROPLASTY, RIGHT: ICD-10-CM

## 2022-05-18 DIAGNOSIS — G89.29 CHRONIC PAIN OF LEFT KNEE: ICD-10-CM

## 2022-05-18 DIAGNOSIS — M25.662 KNEE STIFFNESS, LEFT: ICD-10-CM

## 2022-05-18 PROCEDURE — G0283 ELEC STIM OTHER THAN WOUND: HCPCS | Performed by: PHYSICAL THERAPIST

## 2022-05-18 PROCEDURE — 97116 GAIT TRAINING THERAPY: CPT | Performed by: PHYSICAL THERAPIST

## 2022-05-18 PROCEDURE — 97110 THERAPEUTIC EXERCISES: CPT | Performed by: PHYSICAL THERAPIST

## 2022-05-18 NOTE — PROGRESS NOTES
Physical Therapy Daily Progress Note      Patient: Salud Gama   : 1945  Diagnosis/ICD-10 Code:  History of left knee replacement [Z96.652]   Problems Addressed this Visit    None     Visit Diagnoses     History of left knee replacement    -  Primary    Knee stiffness, left        Difficulty walking        Chronic pain of left knee        Muscle atrophy of lower extremity        Unsteady gait when walking        S/P TKR (total knee replacement), left        History of total knee arthroplasty, right        Chronic pain of both knees        Weakness          Diagnoses       Codes Comments    History of left knee replacement    -  Primary ICD-10-CM: Z96.652  ICD-9-CM: V43.65     Knee stiffness, left     ICD-10-CM: M25.662  ICD-9-CM: 719.56     Difficulty walking     ICD-10-CM: R26.2  ICD-9-CM: 719.7     Chronic pain of left knee     ICD-10-CM: M25.562, G89.29  ICD-9-CM: 719.46, 338.29     Muscle atrophy of lower extremity     ICD-10-CM: M62.58  ICD-9-CM: 728.2     Unsteady gait when walking     ICD-10-CM: R26.81  ICD-9-CM: 781.2     S/P TKR (total knee replacement), left     ICD-10-CM: Z96.652  ICD-9-CM: V43.65     History of total knee arthroplasty, right     ICD-10-CM: Z96.651  ICD-9-CM: V43.65     Chronic pain of both knees     ICD-10-CM: M25.561, M25.562, G89.29  ICD-9-CM: 719.46, 338.29     Weakness     ICD-10-CM: R53.1  ICD-9-CM: 780.79         Referring practitioner: Bret Schafer PA-C  Date of Initial Visit: Type: THERAPY  Noted: 2022  Today's Date: 2022    VISIT#: 2    Subjective   Salud reports her knee has been a bit sore. She denies any falls since evaluation. She requests PT look at knee as it seems scale-y and she's curious if that's normal.     Objective     See Exercise, Manual, and Modality Logs for complete treatment.     LTKA visualized, intact, mild edema, erythema, and heat noted. Pt admits she hasn't been icing at home. Will cont to monitor for signs of infection.      Assessment/Plan  Pt arrived early on this date.   Progress strengthening /stabilization /functional activity    Sit to stand, mini squats       Timed:         Manual Therapy:         mins  18096;     Therapeutic Exercise:    30     mins  22944;     Neuromuscular Cordell:        mins  35676;    Therapeutic Activity:          mins  78748;     Gait Trainin     mins  65213;     Ultrasound:          mins  31324;    Ionto                                   mins   95671  Self Care                            mins   52924  Canalith Repos                   mins  66734    Un-Timed:  Electrical Stimulation:    15     mins  94425 ( );  Dry Needling          mins 00600/38902  Traction          mins 71037  Low Eval          Mins  20186  Mod Eval          Mins  20146  High Eval                            Mins  43371  Re-Eval                               mins  15803    Timed Treatment:   60   mins   Total Treatment:     75   mins    Cherie Brown, PT, DPT, cert. DN  Physical Therapist  [place] Lic # [id number]

## 2022-06-01 ENCOUNTER — TREATMENT (OUTPATIENT)
Dept: PHYSICAL THERAPY | Facility: CLINIC | Age: 77
End: 2022-06-01

## 2022-06-01 DIAGNOSIS — M25.662 KNEE STIFFNESS, LEFT: ICD-10-CM

## 2022-06-01 DIAGNOSIS — R26.2 DIFFICULTY WALKING: ICD-10-CM

## 2022-06-01 DIAGNOSIS — Z96.652 S/P TKR (TOTAL KNEE REPLACEMENT), LEFT: Primary | ICD-10-CM

## 2022-06-01 PROCEDURE — 97110 THERAPEUTIC EXERCISES: CPT | Performed by: PHYSICAL THERAPIST

## 2022-06-01 PROCEDURE — 97112 NEUROMUSCULAR REEDUCATION: CPT | Performed by: PHYSICAL THERAPIST

## 2022-06-01 NOTE — PROGRESS NOTES
Physical Therapy Daily Progress Note      Patient: Salud Gama   : 1945  Diagnosis/ICD-10 Code:  S/P TKR (total knee replacement), left [Z96.652]  Referring practitioner: Bret Schafer PA-C  Date of Initial Visit: 2022  Today's Date: 2022  Patient seen for 3 sessions.  POC 2x/wk x 12 weeks, exp 22    S/p L TKA 22      VISIT#: 3      Subjective :  Denies pain currently.  Reports she is doing HEP.       Objective     See Exercise, Manual, and Modality Logs for complete treatment.  No IFC/CP as no pain/swelling. Progression as noted.    AROM L knee in supine 5-105 degrees.     Patient Education:      Assessment/Plan:  Improvement noted left knee ROM.  Pt. Needs cueing to perform there ex correctly and with decreased pace. Extreme difficulty with  left quad contraction.       Progress per Plan of Care            Timed:         Manual Therapy:   5      mins  56420;     Therapeutic Exercise:    30     mins  87539;     Neuromuscular Cordell:  10     mins  37957;    Therapeutic Activity:          mins  76834;     Gait Training:           mins  61536;     Ultrasound:          mins  84060;    Ionto                                   mins   50546  Self Care                            mins   53081  Aquatic                               mins 15389    Un-Timed:  Electrical Stimulation:         mins  79214 ( );  Traction          mins 29190      Timed Treatment: 45     mins   Total Treatment:    45    mins    Crystal Nina PTA  Physical Therapist Assistant   [place] license:  #[id number]

## 2022-06-03 ENCOUNTER — TREATMENT (OUTPATIENT)
Dept: PHYSICAL THERAPY | Facility: CLINIC | Age: 77
End: 2022-06-03

## 2022-06-03 DIAGNOSIS — R26.81 UNSTEADY GAIT WHEN WALKING: ICD-10-CM

## 2022-06-03 DIAGNOSIS — G89.29 CHRONIC PAIN OF LEFT KNEE: ICD-10-CM

## 2022-06-03 DIAGNOSIS — Z96.651 HISTORY OF TOTAL KNEE ARTHROPLASTY, RIGHT: ICD-10-CM

## 2022-06-03 DIAGNOSIS — R26.2 DIFFICULTY WALKING: ICD-10-CM

## 2022-06-03 DIAGNOSIS — M25.662 KNEE STIFFNESS, LEFT: ICD-10-CM

## 2022-06-03 DIAGNOSIS — M62.58 MUSCLE ATROPHY OF LOWER EXTREMITY: ICD-10-CM

## 2022-06-03 DIAGNOSIS — Z96.652 HISTORY OF LEFT KNEE REPLACEMENT: ICD-10-CM

## 2022-06-03 DIAGNOSIS — Z96.652 S/P TKR (TOTAL KNEE REPLACEMENT), LEFT: Primary | ICD-10-CM

## 2022-06-03 DIAGNOSIS — M25.562 CHRONIC PAIN OF LEFT KNEE: ICD-10-CM

## 2022-06-03 PROCEDURE — 97112 NEUROMUSCULAR REEDUCATION: CPT | Performed by: PHYSICAL THERAPIST

## 2022-06-03 PROCEDURE — 97110 THERAPEUTIC EXERCISES: CPT | Performed by: PHYSICAL THERAPIST

## 2022-06-03 PROCEDURE — 97116 GAIT TRAINING THERAPY: CPT | Performed by: PHYSICAL THERAPIST

## 2022-06-03 NOTE — PROGRESS NOTES
Physical Therapy Daily Progress Note      Patient: Salud Gama   : 1945  Diagnosis/ICD-10 Code:  S/P TKR (total knee replacement), left [Z96.652]   Problems Addressed this Visit    None     Visit Diagnoses     S/P TKR (total knee replacement), left    -  Primary    Knee stiffness, left        Difficulty walking        History of left knee replacement        Chronic pain of left knee        Muscle atrophy of lower extremity        Unsteady gait when walking        History of total knee arthroplasty, right          Diagnoses       Codes Comments    S/P TKR (total knee replacement), left    -  Primary ICD-10-CM: Z96.652  ICD-9-CM: V43.65     Knee stiffness, left     ICD-10-CM: M25.662  ICD-9-CM: 719.56     Difficulty walking     ICD-10-CM: R26.2  ICD-9-CM: 719.7     History of left knee replacement     ICD-10-CM: Z96.652  ICD-9-CM: V43.65     Chronic pain of left knee     ICD-10-CM: M25.562, G89.29  ICD-9-CM: 719.46, 338.29     Muscle atrophy of lower extremity     ICD-10-CM: M62.58  ICD-9-CM: 728.2     Unsteady gait when walking     ICD-10-CM: R26.81  ICD-9-CM: 781.2     History of total knee arthroplasty, right     ICD-10-CM: Z96.651  ICD-9-CM: V43.65         Referring practitioner: Bret Schafer PA-C  Date of Initial Visit: Type: THERAPY  Noted: 2022  Today's Date: 6/3/2022    VISIT#: 4    Subjective   Salud reports she is feeling like she's getting back to normal.     Objective     See Exercise, Manual, and Modality Logs for complete treatment.     Assessment/Plan  Salud requires verbal and visual cues to avoid L knee valgus during step taps, STS, and staggered stance w/lead foot on TM edge. She is amb w/minimal L antalgia and mod frontal plane trunk sway.   Progress strengthening /stabilization /functional activity         Timed:         Manual Therapy:         mins  47370;     Therapeutic Exercise:    38     mins  06969;     Neuromuscular Cordell:    8    mins  72471;    Therapeutic Activity:     2      mins  04757;     Gait Training:      10     mins  78637;     Ultrasound:          mins  67204;    Ionto                                   mins   99747  Self Care                            mins   15153  Canalith Repos                   mins  83414    Un-Timed:  Electrical Stimulation:         mins  96724 ( );  Dry Needling          mins 57611/74270  Traction          mins 09582  Low Eval          Mins  16247  Mod Eval          Mins  11990  High Eval                            Mins  05644  Re-Eval                               mins  45516    Timed Treatment:   58   mins   Total Treatment:     58   mins    Cherie Brown PT, DPT, cert. DN  Physical Therapist  [place] Lic # [id number]

## 2022-06-07 ENCOUNTER — TREATMENT (OUTPATIENT)
Dept: PHYSICAL THERAPY | Facility: CLINIC | Age: 77
End: 2022-06-07

## 2022-06-07 DIAGNOSIS — Z96.652 HISTORY OF LEFT KNEE REPLACEMENT: ICD-10-CM

## 2022-06-07 DIAGNOSIS — G89.29 CHRONIC PAIN OF LEFT KNEE: ICD-10-CM

## 2022-06-07 DIAGNOSIS — M62.58 MUSCLE ATROPHY OF LOWER EXTREMITY: ICD-10-CM

## 2022-06-07 DIAGNOSIS — M25.662 KNEE STIFFNESS, LEFT: ICD-10-CM

## 2022-06-07 DIAGNOSIS — R26.81 UNSTEADY GAIT WHEN WALKING: ICD-10-CM

## 2022-06-07 DIAGNOSIS — R26.2 DIFFICULTY WALKING: ICD-10-CM

## 2022-06-07 DIAGNOSIS — M25.562 CHRONIC PAIN OF LEFT KNEE: ICD-10-CM

## 2022-06-07 DIAGNOSIS — Z96.652 S/P TKR (TOTAL KNEE REPLACEMENT), LEFT: Primary | ICD-10-CM

## 2022-06-07 PROCEDURE — 97116 GAIT TRAINING THERAPY: CPT | Performed by: PHYSICAL THERAPIST

## 2022-06-07 PROCEDURE — 97110 THERAPEUTIC EXERCISES: CPT | Performed by: PHYSICAL THERAPIST

## 2022-06-07 NOTE — PROGRESS NOTES
Physical Therapy Daily Progress Note      Patient: Salud Gama   : 1945  Diagnosis/ICD-10 Code:  S/P TKR (total knee replacement), left [Z96.652]   Problems Addressed this Visit    None     Visit Diagnoses     S/P TKR (total knee replacement), left    -  Primary    Knee stiffness, left        Difficulty walking        History of left knee replacement        Chronic pain of left knee        Muscle atrophy of lower extremity        Unsteady gait when walking          Diagnoses       Codes Comments    S/P TKR (total knee replacement), left    -  Primary ICD-10-CM: Z96.652  ICD-9-CM: V43.65     Knee stiffness, left     ICD-10-CM: M25.662  ICD-9-CM: 719.56     Difficulty walking     ICD-10-CM: R26.2  ICD-9-CM: 719.7     History of left knee replacement     ICD-10-CM: Z96.652  ICD-9-CM: V43.65     Chronic pain of left knee     ICD-10-CM: M25.562, G89.29  ICD-9-CM: 719.46, 338.29     Muscle atrophy of lower extremity     ICD-10-CM: M62.58  ICD-9-CM: 728.2     Unsteady gait when walking     ICD-10-CM: R26.81  ICD-9-CM: 781.2         Referring practitioner: Bret Schafer PA-C  Date of Initial Visit: Type: THERAPY  Noted: 2022  Today's Date: 2022    VISIT#: 5    Subjective   Salud reports she's been doing well, feels like her balance is getting better.     Objective     See Exercise, Manual, and Modality Logs for complete treatment.     Assessment/Plan  Salud cont to exhibit R genu valgum >L during STS & step taps on Tm edge. She is highly motivated and was advised to use her resistance band at home for seated hip abd to increase strength and improve mechanics.   Progress strengthening /stabilization /functional activity         Timed:         Manual Therapy:         mins  14747;     Therapeutic Exercise:    25     mins  22011;     Neuromuscular Cordell:        mins  68836;    Therapeutic Activity:          mins  81513;     Gait Trainin     mins  87571;     Ultrasound:          mins  86968;    Ionto                                    mins   54018  Self Care                            mins   62204  Canalith Repos                   mins  76760    Un-Timed:  Electrical Stimulation:         mins  52246 ( );  Dry Needling          mins 11858/10208  Traction          mins 63387  Low Eval          Mins  73667  Mod Eval          Mins  36897  High Eval                            Mins  79889  Re-Eval                               mins  62956    Timed Treatment:   43   mins   Total Treatment:     43   mins    Cherie Brown, PT, DPT, cert. DN  Physical Therapist  [place] Lic # [id number]

## 2022-06-09 ENCOUNTER — TREATMENT (OUTPATIENT)
Dept: PHYSICAL THERAPY | Facility: CLINIC | Age: 77
End: 2022-06-09

## 2022-06-09 ENCOUNTER — TRANSCRIBE ORDERS (OUTPATIENT)
Dept: ADMINISTRATIVE | Facility: HOSPITAL | Age: 77
End: 2022-06-09

## 2022-06-09 DIAGNOSIS — M62.58 MUSCLE ATROPHY OF LOWER EXTREMITY: ICD-10-CM

## 2022-06-09 DIAGNOSIS — R26.2 DIFFICULTY WALKING: ICD-10-CM

## 2022-06-09 DIAGNOSIS — Z96.652 HISTORY OF LEFT KNEE REPLACEMENT: ICD-10-CM

## 2022-06-09 DIAGNOSIS — G89.29 CHRONIC PAIN OF LEFT KNEE: ICD-10-CM

## 2022-06-09 DIAGNOSIS — Z96.652 S/P TKR (TOTAL KNEE REPLACEMENT), LEFT: Primary | ICD-10-CM

## 2022-06-09 DIAGNOSIS — M25.562 CHRONIC PAIN OF LEFT KNEE: ICD-10-CM

## 2022-06-09 DIAGNOSIS — M25.662 KNEE STIFFNESS, LEFT: ICD-10-CM

## 2022-06-09 DIAGNOSIS — R26.81 UNSTEADY GAIT WHEN WALKING: ICD-10-CM

## 2022-06-09 DIAGNOSIS — Z12.31 SCREENING MAMMOGRAM, ENCOUNTER FOR: Primary | ICD-10-CM

## 2022-06-09 PROCEDURE — 97116 GAIT TRAINING THERAPY: CPT | Performed by: PHYSICAL THERAPIST

## 2022-06-09 PROCEDURE — 97112 NEUROMUSCULAR REEDUCATION: CPT | Performed by: PHYSICAL THERAPIST

## 2022-06-09 PROCEDURE — 97110 THERAPEUTIC EXERCISES: CPT | Performed by: PHYSICAL THERAPIST

## 2022-06-09 NOTE — PROGRESS NOTES
Physical Therapy Daily Progress Note      Patient: Salud Gama   : 1945  Diagnosis/ICD-10 Code:  S/P TKR (total knee replacement), left [Z96.652]   Problems Addressed this Visit    None     Visit Diagnoses     S/P TKR (total knee replacement), left    -  Primary    Knee stiffness, left        Difficulty walking        History of left knee replacement        Chronic pain of left knee        Muscle atrophy of lower extremity        Unsteady gait when walking          Diagnoses       Codes Comments    S/P TKR (total knee replacement), left    -  Primary ICD-10-CM: Z96.652  ICD-9-CM: V43.65     Knee stiffness, left     ICD-10-CM: M25.662  ICD-9-CM: 719.56     Difficulty walking     ICD-10-CM: R26.2  ICD-9-CM: 719.7     History of left knee replacement     ICD-10-CM: Z96.652  ICD-9-CM: V43.65     Chronic pain of left knee     ICD-10-CM: M25.562, G89.29  ICD-9-CM: 719.46, 338.29     Muscle atrophy of lower extremity     ICD-10-CM: M62.58  ICD-9-CM: 728.2     Unsteady gait when walking     ICD-10-CM: R26.81  ICD-9-CM: 781.2         Referring practitioner: Bret Schafer PA-C  Date of Initial Visit: Type: THERAPY  Noted: 2022  Today's Date: 2022    VISIT#: 6    Subjective   Salud reports she's trying to keep up with her exercises at home. She has a family reunion coming up and will be doing a lot of walking.     Objective     See Exercise, Manual, and Modality Logs for complete treatment.     Assessment/Plan  Salud cont to exhibit R genu valgum and L trunk shift in standing and sitting. She is doing very well in terms of functional mobility, but exhibits hip weakness.   Progress strengthening /stabilization /functional activity         Timed:         Manual Therapy:         mins  61453;     Therapeutic Exercise:    23     mins  01023;     Neuromuscular Cordell:    9    mins  28343;    Therapeutic Activity:          mins  52410;     Gait Trainin     mins  56551;     Ultrasound:          mins  21157;     Ionto                                   mins   37399  Self Care                            mins   04988  Canalith Repos                   mins  23348    Un-Timed:  Electrical Stimulation:         mins  12278 ( );  Dry Needling          mins 86740/17517  Traction          mins 03511  Low Eval          Mins  89603  Mod Eval          Mins  03114  High Eval                            Mins  13682  Re-Eval                               mins  85976    Timed Treatment:   40   mins   Total Treatment:     40   mins    Cherie Brown, PT, DPT, cert. DN  Physical Therapist  [place] Lic # [id number]

## 2022-06-13 ENCOUNTER — OFFICE VISIT (OUTPATIENT)
Dept: FAMILY MEDICINE CLINIC | Facility: CLINIC | Age: 77
End: 2022-06-13

## 2022-06-13 ENCOUNTER — TREATMENT (OUTPATIENT)
Dept: PHYSICAL THERAPY | Facility: CLINIC | Age: 77
End: 2022-06-13

## 2022-06-13 VITALS
BODY MASS INDEX: 23.96 KG/M2 | RESPIRATION RATE: 16 BRPM | HEART RATE: 93 BPM | WEIGHT: 130.2 LBS | DIASTOLIC BLOOD PRESSURE: 60 MMHG | HEIGHT: 62 IN | TEMPERATURE: 97.8 F | OXYGEN SATURATION: 98 % | SYSTOLIC BLOOD PRESSURE: 112 MMHG

## 2022-06-13 DIAGNOSIS — F02.80 EARLY ONSET ALZHEIMER'S DEMENTIA WITHOUT BEHAVIORAL DISTURBANCE: ICD-10-CM

## 2022-06-13 DIAGNOSIS — Z00.00 MEDICARE ANNUAL WELLNESS VISIT, SUBSEQUENT: Primary | ICD-10-CM

## 2022-06-13 DIAGNOSIS — Z12.11 ENCOUNTER FOR COLORECTAL CANCER SCREENING: ICD-10-CM

## 2022-06-13 DIAGNOSIS — Z96.652 S/P TKR (TOTAL KNEE REPLACEMENT), LEFT: Primary | ICD-10-CM

## 2022-06-13 DIAGNOSIS — G30.0 EARLY ONSET ALZHEIMER'S DEMENTIA WITHOUT BEHAVIORAL DISTURBANCE: ICD-10-CM

## 2022-06-13 DIAGNOSIS — F32.89 OTHER DEPRESSION: Chronic | ICD-10-CM

## 2022-06-13 DIAGNOSIS — R92.2 DENSE BREAST: ICD-10-CM

## 2022-06-13 DIAGNOSIS — Z13.220 SCREENING FOR HYPERLIPIDEMIA: ICD-10-CM

## 2022-06-13 DIAGNOSIS — M25.662 KNEE STIFFNESS, LEFT: ICD-10-CM

## 2022-06-13 DIAGNOSIS — Z12.12 ENCOUNTER FOR COLORECTAL CANCER SCREENING: ICD-10-CM

## 2022-06-13 DIAGNOSIS — R26.2 DIFFICULTY WALKING: ICD-10-CM

## 2022-06-13 DIAGNOSIS — Z12.4 SCREENING FOR MALIGNANT NEOPLASM OF CERVIX: ICD-10-CM

## 2022-06-13 DIAGNOSIS — Z96.651 TOTAL KNEE REPLACEMENT STATUS, RIGHT: ICD-10-CM

## 2022-06-13 DIAGNOSIS — M15.9 PRIMARY OSTEOARTHRITIS INVOLVING MULTIPLE JOINTS: ICD-10-CM

## 2022-06-13 DIAGNOSIS — Z12.31 ENCOUNTER FOR SCREENING MAMMOGRAM FOR MALIGNANT NEOPLASM OF BREAST: ICD-10-CM

## 2022-06-13 DIAGNOSIS — K21.9 GASTROESOPHAGEAL REFLUX DISEASE WITHOUT ESOPHAGITIS: ICD-10-CM

## 2022-06-13 DIAGNOSIS — M85.89 OSTEOPENIA OF MULTIPLE SITES: ICD-10-CM

## 2022-06-13 PROBLEM — R07.9 CHEST PAIN SYNDROME: Status: RESOLVED | Noted: 2021-03-19 | Resolved: 2022-06-13

## 2022-06-13 LAB
BILIRUB BLD-MCNC: NEGATIVE MG/DL
CLARITY, POC: CLEAR
COLOR UR: YELLOW
GLUCOSE UR STRIP-MCNC: NEGATIVE MG/DL
KETONES UR QL: NEGATIVE
LEUKOCYTE EST, POC: ABNORMAL
NITRITE UR-MCNC: POSITIVE MG/ML
PH UR: 5 [PH] (ref 5–8)
PROT UR STRIP-MCNC: NEGATIVE MG/DL
RBC # UR STRIP: NEGATIVE /UL
SP GR UR: 1.03 (ref 1–1.03)
UROBILINOGEN UR QL: NORMAL

## 2022-06-13 PROCEDURE — 1126F AMNT PAIN NOTED NONE PRSNT: CPT | Performed by: FAMILY MEDICINE

## 2022-06-13 PROCEDURE — G0439 PPPS, SUBSEQ VISIT: HCPCS | Performed by: FAMILY MEDICINE

## 2022-06-13 PROCEDURE — 1170F FXNL STATUS ASSESSED: CPT | Performed by: FAMILY MEDICINE

## 2022-06-13 PROCEDURE — 97112 NEUROMUSCULAR REEDUCATION: CPT | Performed by: PHYSICAL THERAPIST

## 2022-06-13 PROCEDURE — G0444 DEPRESSION SCREEN ANNUAL: HCPCS | Performed by: FAMILY MEDICINE

## 2022-06-13 PROCEDURE — 97140 MANUAL THERAPY 1/> REGIONS: CPT | Performed by: PHYSICAL THERAPIST

## 2022-06-13 PROCEDURE — 1159F MED LIST DOCD IN RCRD: CPT | Performed by: FAMILY MEDICINE

## 2022-06-13 PROCEDURE — 81002 URINALYSIS NONAUTO W/O SCOPE: CPT | Performed by: FAMILY MEDICINE

## 2022-06-13 PROCEDURE — 97110 THERAPEUTIC EXERCISES: CPT | Performed by: PHYSICAL THERAPIST

## 2022-06-13 PROCEDURE — 99214 OFFICE O/P EST MOD 30 MIN: CPT | Performed by: FAMILY MEDICINE

## 2022-06-13 RX ORDER — OMEPRAZOLE 20 MG/1
20 CAPSULE, DELAYED RELEASE ORAL DAILY
Qty: 90 CAPSULE | Refills: 3 | Status: SHIPPED | OUTPATIENT
Start: 2022-06-13

## 2022-06-13 NOTE — PROGRESS NOTES
Physical Therapy Daily Progress Note      Patient: Salud Gama   : 1945  Diagnosis/ICD-10 Code:  S/P TKR (total knee replacement), left [Z96.652]  Referring practitioner: Bret Schafer PA-C  Date of Initial Visit: 2022  Today's Date: 2022  Patient seen for 7 sessions.  POC 2x/wk x 12 weeks, exp 22    S/p L TKA 22      VISIT#: 7      Subjective :  Pt. Still sore from previous session.  Pain 3/10 L patella.       Objective     See Exercise, Manual, and Modality Logs for complete treatment.      AROM L knee in supine 5-106 degrees.     Patient Education:      Assessment/Plan:  Pt. Very forgetful.  She needs continuous cueing with activity due to decreased coordination and inability to follow instructions consistently.       Progress per Plan of Care  :  RCB vs. NuStep             Timed:         Manual Therapy:   10      mins  87898;     Therapeutic Exercise:    20     mins  95832;     Neuromuscular Cordell:  15     mins  84714;    Therapeutic Activity:          mins  09563;     Gait Training:           mins  08321;     Ultrasound:          mins  13128;    Ionto                                   mins   21667  Self Care                            mins   51724  Aquatic                               mins 64447    Un-Timed:  Electrical Stimulation:         mins  19098 ( );  Traction          mins 19327      Timed Treatment: 45     mins   Total Treatment:    45    mins    Crystal Nina PTA  Physical Therapist Assistant   [place] license:  #[id number]

## 2022-06-13 NOTE — PROGRESS NOTES
The ABCs of the Annual Wellness Visit  Subsequent Medicare Wellness Visit    Chief Complaint   Patient presents with   • Medicare Wellness-subsequent      Subjective    History of Present Illness:  Salud Gama is a 77 y.o. female who presents for a Subsequent Medicare Wellness Visit. The patient is here: for coordination of medical care to discuss health maintenance and disease prevention. Overall has: moderate activity with work/home activities, exercises 2 - 3 times per week, good appetite, feels well with no complaints, good energy level and is sleeping well. Nutrition: balanced diet. Last tetanus shot was . Salud Gama is -2, Parity-2. Patient's last menstrual period was 1976. She is postmenopausal.      Salud had left total knee arthroplasty on 2022 with Dr. Michel. She is currently attending physical therapy twice weekly.      The following portions of the patient's history were reviewed and   updated as appropriate: allergies, current medications, past family history, past medical history, past social history, past surgical history and problem list.     Compared to one year ago, the patient feels her physical   health is the same.    Compared to one year ago, the patient feels her mental   health is the same.    Recent Hospitalizations:  She was admitted to the hospital during the last year. [facility] 2022 for left knee replacement      Current Medical Providers:  Patient Care Team:  Virgie Broderick MD as PCP - General  Seipel, Joseph F, MD as Consulting Physician (Sleep Medicine)  Panda Michel MD as Consulting Physician (Orthopedic Surgery)  Barbara Calderon MD as Consulting Physician (Gastroenterology)  Eugene Ayers MD as Consulting Physician (Urology)  Mannie Perez MD as Consulting Physician (Cardiology)    Outpatient Medications Prior to Visit   Medication Sig Dispense Refill   • Cholecalciferol (VITAMIN D) 25 MCG (1000 UT) tablet Take  1 tablet by mouth Daily.     • citalopram (CeleXA) 20 MG tablet Take 1 tablet by mouth once daily 90 tablet 3   • memantine (NAMENDA) 10 MG tablet Take 1 tablet by mouth twice daily 60 tablet 5   • Probiotic Product (PROBIOTIC-10 PO) Take 1 capsule by mouth Every Morning.     • vitamin B-12 (CYANOCOBALAMIN) 100 MCG tablet Take 50 mcg by mouth Daily.     • omeprazole (priLOSEC) 20 MG capsule Take 2 capsules by mouth once daily (Patient taking differently: Take 20 mg by mouth Daily.) 30 capsule 12   • meloxicam (MOBIC) 15 MG tablet Take 1 tablet by mouth Daily. 30 tablet 0     No facility-administered medications prior to visit.       No opioid medication identified on active medication list. I have reviewed chart for other potential  high risk medication/s and harmful drug interactions in the elderly.          Aspirin is not on active medication list.  Aspirin use is not indicated based on review of current medical condition/s. Risk of harm outweighs potential benefits.  .    Patient Active Problem List   Diagnosis   • Osteoarthritis   • Osteopenia of multiple sites   • Varicose veins of lower extremity   • Encounter for screening mammogram for malignant neoplasm of breast   • Encounter for colorectal cancer screening   • Medicare annual wellness visit, subsequent   • Screening for malignant neoplasm of cervix   • Total knee replacement status, right   • Dense breast   • Early onset Alzheimer's dementia without behavioral disturbance (HCC)   • Depression   • Gastroesophageal reflux disease without esophagitis   • Total knee replacement status, left     Advance Care Planning   Advance Directive is on file.  ACP discussion was held with the patient during this visit. Patient has an advance directive in EMR which is still valid.     A face-to-face visit was completed today with patient.  Counseling explanation, and discussion of advanced directives was performed.   The last advanced care visit was performed in 2021.  In  a near life ending situation, from which she is not expected to recover functionally, and she is not able to speak for herself, she does not want life sustaining measures. We discussed feeding tubes, ventilators and cardiac support as well as dialysis.  Her daughter is present  I spent less than 16 minutes discussing Advanced Care Planning information and documenting in the chart.    Review of Systems   Constitutional: Negative for activity change, appetite change, fatigue, fever and unexpected weight change.   HENT: Negative for congestion, hearing loss, rhinorrhea, sinus pain, sore throat, tinnitus, trouble swallowing and voice change.    Eyes: Negative for visual disturbance.   Respiratory: Negative for apnea, cough, shortness of breath and wheezing.    Cardiovascular: Negative for chest pain and palpitations.   Gastrointestinal: Negative for abdominal pain, blood in stool, constipation, diarrhea, nausea and vomiting.   Endocrine: Negative for cold intolerance, heat intolerance, polydipsia and polyuria.   Genitourinary: Negative for difficulty urinating, dysuria, flank pain, frequency and hematuria.   Musculoskeletal: Negative for arthralgias, joint swelling and myalgias.   Skin: Negative for rash.   Allergic/Immunologic: Negative for environmental allergies and immunocompromised state.   Neurological: Negative for dizziness, syncope, weakness, numbness and headaches.        Gradually worsening dementia.    Hematological: Negative for adenopathy. Does not bruise/bleed easily.   Psychiatric/Behavioral: Negative for dysphoric mood and suicidal ideas. The patient is not nervous/anxious.        I have reviewed and confirmed the accuracy of the HPI and ROS as documented by the MA/LPN/RN Virgie Broderick MD    Reviewed chart for potential of high risk medication in the elderly: yes  Reviewed chart for potential of harmful drug interactions in the elderly:yes       Objective       Vitals:    06/13/22 1324   BP: 112/60  "  BP Location: Right arm   Patient Position: Sitting   Cuff Size: Adult   Pulse: 93   Resp: 16   Temp: 97.8 °F (36.6 °C)   TempSrc: Temporal   SpO2: 98%  Comment: Room air   Weight: 59.1 kg (130 lb 3.2 oz)   Height: 157.5 cm (62\")   PainSc: 0-No pain     BMI Readings from Last 1 Encounters:   06/13/22 23.81 kg/m²   BMI is within normal parameters. No follow-up required.    Does the patient have evidence of cognitive impairment? Yes    Physical Exam  Vitals reviewed.   Constitutional:       General: She is not in acute distress.     Appearance: She is well-developed.   HENT:      Right Ear: External ear normal.      Left Ear: External ear normal.      Mouth/Throat:      Pharynx: No oropharyngeal exudate.   Eyes:      General:         Right eye: No discharge.         Left eye: No discharge.      Conjunctiva/sclera: Conjunctivae normal.      Pupils: Pupils are equal, round, and reactive to light.   Neck:      Thyroid: No thyromegaly.      Vascular: No JVD.   Cardiovascular:      Rate and Rhythm: Normal rate and regular rhythm.      Pulses:           Carotid pulses are 2+ on the right side and 2+ on the left side.       Femoral pulses are 2+ on the right side and 2+ on the left side.       Dorsalis pedis pulses are 2+ on the right side and 2+ on the left side.      Heart sounds: Normal heart sounds. No murmur heard.    No friction rub. No gallop.   Pulmonary:      Effort: Pulmonary effort is normal. No respiratory distress.      Breath sounds: Normal breath sounds. No stridor. No wheezing or rales.   Chest:      Chest wall: No tenderness.   Abdominal:      General: Bowel sounds are normal. There is no distension.      Palpations: Abdomen is soft. There is no mass.      Tenderness: There is no abdominal tenderness. There is no guarding or rebound.      Hernia: No hernia is present.   Musculoskeletal:         General: No deformity. Normal range of motion.   Lymphadenopathy:      Cervical: No cervical adenopathy.   Skin:   "   Findings: No erythema or rash.   Neurological:      Mental Status: She is alert and oriented to person, place, and time.      Cranial Nerves: No cranial nerve deficit.      Sensory: No sensory deficit.      Motor: No weakness or abnormal muscle tone.      Coordination: Coordination normal.      Deep Tendon Reflexes: Reflexes normal.   Psychiatric:         Behavior: Behavior normal.         Thought Content: Thought content normal.         Judgment: Judgment normal.       Lab Results   Component Value Date    CHLPL 185 06/13/2022    TRIG 269 (H) 06/13/2022    HDL 57 06/13/2022    LDL 84 06/13/2022    VLDL 44 (H) 06/13/2022            HEALTH RISK ASSESSMENT    Smoking Status:  Social History     Tobacco Use   Smoking Status Never Smoker   Smokeless Tobacco Never Used     Alcohol Consumption:  Social History     Substance and Sexual Activity   Alcohol Use No     Fall Risk Screen:    STEADI Fall Risk Assessment was completed, and patient is at MODERATE risk for falls. Assessment completed on:6/13/2022    Depression Screening:  PHQ-2/PHQ-9 Depression Screening 6/13/2022   Retired PHQ-9 Total Score -   Retired Total Score -   Little Interest or Pleasure in Doing Things 0-->not at all   Feeling Down, Depressed or Hopeless 0-->not at all   PHQ-9: Brief Depression Severity Measure Score 0       Health Habits and Functional and Cognitive Screening:  Functional & Cognitive Status 6/13/2022   Do you have difficulty preparing food and eating? No   Do you have difficulty bathing yourself, getting dressed or grooming yourself? No   Do you have difficulty using the toilet? No   Do you have difficulty moving around from place to place? No   Do you have trouble with steps or getting out of a bed or a chair? No   Current Diet Well Balanced Diet   Dental Exam Up to date        Dental Exam Comment 11/2021   Eye Exam Not up to date        Eye Exam Comment [facility]   Exercise (times per week) 3 times per week    Current Exercises Include Stationary Bicycling/Spin Class        Exercise Comment Physical therapy   Do you need help using the phone?  No   Are you deaf or do you have serious difficulty hearing?  No   Do you need help with transportation? Yes   Do you need help shopping? No   Do you need help preparing meals?  No   Do you need help with housework?  No   Do you need help with laundry? No   Do you need help taking your medications? Yes   Do you need help managing money? Yes   Do you ever drive or ride in a car without wearing a seat belt? No   Have you felt unusual stress, anger or loneliness in the last month? No   Who do you live with? Spouse   If you need help, do you have trouble finding someone available to you? No   Have you been bothered in the last four weeks by sexual problems? No   Do you have difficulty concentrating, remembering or making decisions? No       Age-appropriate Screening Schedule:  Refer to the list below for future screening recommendations based on patient's age, sex and/or medical conditions. Orders for these recommended tests are listed in the plan section. The patient has been provided with a written plan.    Health Maintenance   Topic Date Due   • ZOSTER VACCINE (1 of 2) Never done   • TDAP/TD VACCINES (2 - Td or Tdap) 04/08/2018   • INFLUENZA VACCINE  10/01/2022   • DXA SCAN  03/04/2023              Assessment & Plan     CMS Preventative Services Quick Reference  Risk Factors Identified During Encounter  Cardiovascular Disease  Dementia/Memory   Depression/Dysphoria  The above risks/problems have been discussed with the patient.  Follow up actions/plans if indicated are seen below in the Assessment/Plan Section.  Pertinent information has been shared with the patient in the After Visit Summary.    Problem List Items Addressed This Visit        High    Early onset Alzheimer's dementia without behavioral disturbance (HCC)    Overview     22 of 30 on mental status exam 06/13/2022  28  of 30 On mental status exam 02.24.2021,   Her sxs remain stable per her daughter.  Continue meds compliant              Low    Depression (Chronic)    Overview     Stable on Cymbalta which is continued.            Osteoarthritis    Overview     Multiple, stable with prn tylenol.               Unprioritized    Osteopenia of multiple sites    Overview     -1.4 spine, -2.0 FN, -2.7 hip 03/04/2021  -1.0 spine, -2.1 FN, -2.4 hip, 02/2019  Wt bearing to  exercise encouraged.  Continue Calcium w/ Vit D           Encounter for screening mammogram for malignant neoplasm of breast    Overview     Scheduled 06/28/2022  Last Mammogram 06/10/2021 fibroglandular density           Encounter for colorectal cancer screening    Overview     Cologuard 05/02/2019 negative Repeat 2022 given FHx father, try an avoid anesthesia given dementia.            Relevant Orders    Cologuard - Stool, Per Rectum    Medicare annual wellness visit, subsequent - Primary    Overview     Diet exercise breast self exams, fall prevention and general safety discussed. Previous lab reviewed, we will notify her of today's lab. She lives with her , her daughter being her primary care giver.           Relevant Orders    POCT urinalysis dipstick, manual (Completed)    TSH (Completed)    CBC & Differential (Completed)    Comprehensive Metabolic Panel (Completed)    Screening for malignant neoplasm of cervix    Overview     s/p BLAINE for cervical abnormalities.           Total knee replacement status, right    Overview     And left doing well.            Dense breast    Overview     Salud Gama's 5 year risk of having breast cancer is 1.6%. The average woman at age 77 y.o. has a risk of 2.2% of having breast cancer.  Salud Gama's life time risk of having breast cancer up to age 90 is 3.0%. The average woman's chance of having breast cancer up to the age of 90 is 4.1%.    We discussed the risk assessment values with Salud Gama, she  understands that she is at (less) than average risk of developing breast cancer at 5 years and over her life time than the average woman of her age. She does not desire to have genetic testing or further work up at this time.                Gastroesophageal reflux disease without esophagitis    Overview     Resolution of CP and GI sxs after beginning PPI. Meds continued.            Relevant Medications    omeprazole (priLOSEC) 20 MG capsule      Other Visit Diagnoses     Screening for hyperlipidemia        Relevant Orders    Lipid Panel With / Chol / HDL Ratio (Completed)           Follow Up:   Return in about 1 year (around 6/13/2023).     An After Visit Summary and PPPS were given to the patient.           Site care done- procedure tolerated well, dressing applied. Venipuncture was obtained after 1 time(s). 3 tubes were drawn. Needle gauge used was 22.

## 2022-06-14 DIAGNOSIS — R35.0 URINARY FREQUENCY: Primary | ICD-10-CM

## 2022-06-14 LAB
ALBUMIN SERPL-MCNC: 3.6 G/DL (ref 3.7–4.7)
ALBUMIN/GLOB SERPL: 1.5 {RATIO} (ref 1.2–2.2)
ALP SERPL-CCNC: 101 IU/L (ref 44–121)
ALT SERPL-CCNC: 9 IU/L (ref 0–32)
AST SERPL-CCNC: 14 IU/L (ref 0–40)
BASOPHILS # BLD AUTO: 0 X10E3/UL (ref 0–0.2)
BASOPHILS NFR BLD AUTO: 0 %
BILIRUB SERPL-MCNC: 0.2 MG/DL (ref 0–1.2)
BUN SERPL-MCNC: 24 MG/DL (ref 8–27)
BUN/CREAT SERPL: 36 (ref 12–28)
CALCIUM SERPL-MCNC: 9.1 MG/DL (ref 8.7–10.3)
CHLORIDE SERPL-SCNC: 105 MMOL/L (ref 96–106)
CHOLEST SERPL-MCNC: 185 MG/DL (ref 100–199)
CHOLEST/HDLC SERPL: 3.2 RATIO (ref 0–4.4)
CO2 SERPL-SCNC: 26 MMOL/L (ref 20–29)
CREAT SERPL-MCNC: 0.66 MG/DL (ref 0.57–1)
EGFRCR SERPLBLD CKD-EPI 2021: 90 ML/MIN/1.73
EOSINOPHIL # BLD AUTO: 0.1 X10E3/UL (ref 0–0.4)
EOSINOPHIL NFR BLD AUTO: 2 %
ERYTHROCYTE [DISTWIDTH] IN BLOOD BY AUTOMATED COUNT: 12.8 % (ref 11.7–15.4)
GLOBULIN SER CALC-MCNC: 2.4 G/DL (ref 1.5–4.5)
GLUCOSE SERPL-MCNC: 116 MG/DL (ref 65–99)
HCT VFR BLD AUTO: 35 % (ref 34–46.6)
HDLC SERPL-MCNC: 57 MG/DL
HGB BLD-MCNC: 11.4 G/DL (ref 11.1–15.9)
IMM GRANULOCYTES # BLD AUTO: 0 X10E3/UL (ref 0–0.1)
IMM GRANULOCYTES NFR BLD AUTO: 0 %
LDLC SERPL CALC-MCNC: 84 MG/DL (ref 0–99)
LYMPHOCYTES # BLD AUTO: 1.1 X10E3/UL (ref 0.7–3.1)
LYMPHOCYTES NFR BLD AUTO: 19 %
MCH RBC QN AUTO: 29.8 PG (ref 26.6–33)
MCHC RBC AUTO-ENTMCNC: 32.6 G/DL (ref 31.5–35.7)
MCV RBC AUTO: 91 FL (ref 79–97)
MONOCYTES # BLD AUTO: 0.4 X10E3/UL (ref 0.1–0.9)
MONOCYTES NFR BLD AUTO: 7 %
NEUTROPHILS # BLD AUTO: 4.1 X10E3/UL (ref 1.4–7)
NEUTROPHILS NFR BLD AUTO: 72 %
PLATELET # BLD AUTO: 151 X10E3/UL (ref 150–450)
POTASSIUM SERPL-SCNC: 4.2 MMOL/L (ref 3.5–5.2)
PROT SERPL-MCNC: 6 G/DL (ref 6–8.5)
RBC # BLD AUTO: 3.83 X10E6/UL (ref 3.77–5.28)
SODIUM SERPL-SCNC: 143 MMOL/L (ref 134–144)
TRIGL SERPL-MCNC: 269 MG/DL (ref 0–149)
TSH SERPL DL<=0.005 MIU/L-ACNC: 1.52 UIU/ML (ref 0.45–4.5)
VLDLC SERPL CALC-MCNC: 44 MG/DL (ref 5–40)
WBC # BLD AUTO: 5.8 X10E3/UL (ref 3.4–10.8)

## 2022-06-14 RX ORDER — SULFAMETHOXAZOLE AND TRIMETHOPRIM 800; 160 MG/1; MG/1
1 TABLET ORAL 2 TIMES DAILY
Qty: 20 TABLET | Refills: 0 | Status: SHIPPED | OUTPATIENT
Start: 2022-06-14 | End: 2022-08-30 | Stop reason: SDUPTHER

## 2022-06-15 ENCOUNTER — TREATMENT (OUTPATIENT)
Dept: PHYSICAL THERAPY | Facility: CLINIC | Age: 77
End: 2022-06-15

## 2022-06-15 DIAGNOSIS — Z96.652 S/P TKR (TOTAL KNEE REPLACEMENT), LEFT: Primary | ICD-10-CM

## 2022-06-15 DIAGNOSIS — M25.662 KNEE STIFFNESS, LEFT: ICD-10-CM

## 2022-06-15 DIAGNOSIS — R26.2 DIFFICULTY WALKING: ICD-10-CM

## 2022-06-15 PROCEDURE — 97110 THERAPEUTIC EXERCISES: CPT | Performed by: PHYSICAL THERAPIST

## 2022-06-15 PROCEDURE — 97112 NEUROMUSCULAR REEDUCATION: CPT | Performed by: PHYSICAL THERAPIST

## 2022-06-15 PROCEDURE — 97140 MANUAL THERAPY 1/> REGIONS: CPT | Performed by: PHYSICAL THERAPIST

## 2022-06-15 NOTE — PROGRESS NOTES
Physical Therapy Daily Progress Note      Patient: Salud Gama   : 1945  Diagnosis/ICD-10 Code:  S/P TKR (total knee replacement), left [Z96.652]  Referring practitioner: Bret Schafer PA-C  Date of Initial Visit: 6/15/2022  Today's Date: 6/15/2022  Patient seen for 8 sessions.  POC 2x/wk x 12 weeks, exp 22    S/p L TKA 22      VISIT#: 8      Subjective :  Pt. Has no pain currently.       Objective     See Exercise, Manual, and Modality Logs for complete treatment.  RCB vs. NuStep.  Progression as noted.     AROM L knee in supine 5-106 degrees.     Patient Education:      Assessment/Plan:  Pt. Anxious with balance activities.  Gait still with considerable deficits of which a lot are due to leg length discrepancy.       Progress per Plan of Care  :            Timed:         Manual Therapy:   10      mins  58630;     Therapeutic Exercise:    20     mins  39972;     Neuromuscular Cordell:  15     mins  50530;    Therapeutic Activity:          mins  00761;     Gait Training:           mins  99441;     Ultrasound:          mins  60532;    Ionto                                   mins   45166  Self Care                            mins   24880  Aquatic                               mins 13964    Un-Timed:  Electrical Stimulation:         mins  84480 ( );  Traction          mins 96210      Timed Treatment: 45     mins   Total Treatment:    45    mins    Crystal Nina PTA  Physical Therapist Assistant   [place] license:  #[id number]

## 2022-06-21 ENCOUNTER — TREATMENT (OUTPATIENT)
Dept: PHYSICAL THERAPY | Facility: CLINIC | Age: 77
End: 2022-06-21

## 2022-06-21 DIAGNOSIS — M25.662 KNEE STIFFNESS, LEFT: ICD-10-CM

## 2022-06-21 DIAGNOSIS — Z96.652 S/P TKR (TOTAL KNEE REPLACEMENT), LEFT: Primary | ICD-10-CM

## 2022-06-21 DIAGNOSIS — R26.2 DIFFICULTY WALKING: ICD-10-CM

## 2022-06-21 DIAGNOSIS — Z96.652 HISTORY OF LEFT KNEE REPLACEMENT: ICD-10-CM

## 2022-06-21 DIAGNOSIS — M25.562 CHRONIC PAIN OF LEFT KNEE: ICD-10-CM

## 2022-06-21 DIAGNOSIS — G89.29 CHRONIC PAIN OF LEFT KNEE: ICD-10-CM

## 2022-06-21 PROCEDURE — 97112 NEUROMUSCULAR REEDUCATION: CPT | Performed by: PHYSICAL THERAPIST

## 2022-06-21 PROCEDURE — 97110 THERAPEUTIC EXERCISES: CPT | Performed by: PHYSICAL THERAPIST

## 2022-06-21 NOTE — PROGRESS NOTES
Progress Note      Patient: Salud Gama   : 1945  Diagnosis/ICD-10 Code:  S/P TKR (total knee replacement), left [Z96.652]  Referring practitioner: Bret Schafer PA-C  Date of Initial Visit: Type: THERAPY  Noted: 2022  Today's Date: 2022  Patient seen for 9 sessions      Subjective:   Salud Gama reports: she is feeling more steady, not as wobbly. She also remarks her friends/family have commented on how much better she's walking now. She feels fairly confident she'll be able to DC at end of sched'd appts (). She verbalizes understanding of importance of continuation of HEP including balance.   Subjective Questionnaire: PT Functional Test: [place] Knee   Clinical Progress: improved  Home Program Compliance: fair  Treatment has included: therapeutic exercise, neuromuscular re-education and gait training      Subjective   Objective   Assessment/Plan Salud has made meaningful progress in terms of functional mobility and strength. She is still significantly limited in L knee AROM and was strongly encouraged to focus on knee flexion stretching at home. She was also advised to take her 4ww to family reunion with her, in addition to her SPC, for seated rest if needed.     Progress toward previous goals: Partially Met  Plan Goals: ST. Pt will demonstrate at least 100 deg L knee flexion actively to normalize gait in 2 wks. -  deg on 22  2. Pt will ambulate with normalized gait pattern using  SPC x 150' for safe short community negotiation in 2 wks - Progressing, B trendelenburg  3. Pt will demonstrate no greater than 5 deg R extensor lag to reduce feeling of giving way and improve safety. - MET  4. Pt will demonstrate rise from standard chair using unilat UE support x 5 to reflect improved functional strength.   -MET    LT. Pt will demonstrate at least 120 deg L knee flexion actively for normalized gait pattern by DC.  - NOT  deg on   2.Pt will ambulate indep,  w/normalized gait pattern x at least 500' for safe short community distances. - Progressing, B trendelenburg  3. Pt will demonstrate 0 deg extensor lag for normalized gait pattern & functional strength  - MET on   4. Pt will demonstrate at least 3 STS without UE support to reflect improved LE strength and dynamic stability. - MET 20x on 22  5. Pt will improve [place] Knee score from 31/48 to at least 38/48-  MET on         Recommendations: Continue as planned  Timeframe: 1 month  Frequency: 2 per week  Duration in visits: 8  Planned therapy and modality interventions: therapeutic exercise, neuromuscular re-education and gait training  Prognosis to achieve goals: good    PT Signature: Cherie Brown PT  [place] License # [id number]  Electronically signed by Cherie Brown PT, 22, 8:26 AM EDT    Based upon review of the patient's progress and continued therapy plan, it is my medical opinion that Salud Gama should continue physical therapy treatment at [facility]  [address] [place] [address] DR SHIRLEY  [address] [place] [address] [phone].    PHYSICIAN: Bret Schafer PA-C    NPI: [id number]                                       DATE:    Timed:         Manual Therapy:         mins  44166;     Therapeutic Exercise:    25     mins  84658;     Neuromuscular Cordell:    15    mins  69876;    Therapeutic Activity:          mins  78296;     Gait Trainin     mins  32204;     Ultrasound:          mins  78920;    Ionto                                   mins   56234  Self Care                            mins   68991    Un-Timed:  Electrical Stimulation:         mins  72215 ( );  Dry Needling          mins ;   Traction          mins 56317  Re-eval                          10     mins  28921    Timed Treatment:   44   mins   Total Treatment:     54   mins

## 2022-06-28 ENCOUNTER — APPOINTMENT (OUTPATIENT)
Dept: MAMMOGRAPHY | Facility: HOSPITAL | Age: 77
End: 2022-06-28

## 2022-06-28 ENCOUNTER — TREATMENT (OUTPATIENT)
Dept: PHYSICAL THERAPY | Facility: CLINIC | Age: 77
End: 2022-06-28

## 2022-06-28 DIAGNOSIS — M25.662 KNEE STIFFNESS, LEFT: ICD-10-CM

## 2022-06-28 DIAGNOSIS — Z96.652 HISTORY OF LEFT KNEE REPLACEMENT: ICD-10-CM

## 2022-06-28 DIAGNOSIS — R26.2 DIFFICULTY WALKING: ICD-10-CM

## 2022-06-28 DIAGNOSIS — G89.29 CHRONIC PAIN OF BOTH KNEES: ICD-10-CM

## 2022-06-28 DIAGNOSIS — M62.58 MUSCLE ATROPHY OF LOWER EXTREMITY: ICD-10-CM

## 2022-06-28 DIAGNOSIS — M25.562 CHRONIC PAIN OF BOTH KNEES: ICD-10-CM

## 2022-06-28 DIAGNOSIS — Z96.651 HISTORY OF TOTAL KNEE ARTHROPLASTY, RIGHT: ICD-10-CM

## 2022-06-28 DIAGNOSIS — M25.561 CHRONIC PAIN OF BOTH KNEES: ICD-10-CM

## 2022-06-28 DIAGNOSIS — M25.562 CHRONIC PAIN OF LEFT KNEE: ICD-10-CM

## 2022-06-28 DIAGNOSIS — G89.29 CHRONIC PAIN OF LEFT KNEE: ICD-10-CM

## 2022-06-28 DIAGNOSIS — R26.81 UNSTEADY GAIT WHEN WALKING: ICD-10-CM

## 2022-06-28 DIAGNOSIS — Z96.652 S/P TKR (TOTAL KNEE REPLACEMENT), LEFT: Primary | ICD-10-CM

## 2022-06-28 DIAGNOSIS — R53.1 WEAKNESS: ICD-10-CM

## 2022-06-28 PROCEDURE — 97116 GAIT TRAINING THERAPY: CPT | Performed by: PHYSICAL THERAPIST

## 2022-06-28 PROCEDURE — 97112 NEUROMUSCULAR REEDUCATION: CPT | Performed by: PHYSICAL THERAPIST

## 2022-06-28 PROCEDURE — 97110 THERAPEUTIC EXERCISES: CPT | Performed by: PHYSICAL THERAPIST

## 2022-06-28 NOTE — PROGRESS NOTES
Physical Therapy Daily Progress Note      Patient: Salud Gama   : 1945  Diagnosis/ICD-10 Code:  S/P TKR (total knee replacement), left [Z96.652]   Problems Addressed this Visit    None     Visit Diagnoses     S/P TKR (total knee replacement), left    -  Primary    Knee stiffness, left        Difficulty walking        History of left knee replacement        Chronic pain of left knee        Muscle atrophy of lower extremity        Unsteady gait when walking        History of total knee arthroplasty, right        Chronic pain of both knees        Weakness          Diagnoses       Codes Comments    S/P TKR (total knee replacement), left    -  Primary ICD-10-CM: Z96.652  ICD-9-CM: V43.65     Knee stiffness, left     ICD-10-CM: M25.662  ICD-9-CM: 719.56     Difficulty walking     ICD-10-CM: R26.2  ICD-9-CM: 719.7     History of left knee replacement     ICD-10-CM: Z96.652  ICD-9-CM: V43.65     Chronic pain of left knee     ICD-10-CM: M25.562, G89.29  ICD-9-CM: 719.46, 338.29     Muscle atrophy of lower extremity     ICD-10-CM: M62.58  ICD-9-CM: 728.2     Unsteady gait when walking     ICD-10-CM: R26.81  ICD-9-CM: 781.2     History of total knee arthroplasty, right     ICD-10-CM: Z96.651  ICD-9-CM: V43.65     Chronic pain of both knees     ICD-10-CM: M25.561, M25.562, G89.29  ICD-9-CM: 719.46, 338.29     Weakness     ICD-10-CM: R53.1  ICD-9-CM: 780.79         Referring practitioner: Bret Schafer PA-C  Date of Initial Visit: Type: THERAPY  Noted: 2022  Today's Date: 2022    VISIT#: 10    Subjective   Salud reports she traveled last weekend for family reunion. She     Objective     See Exercise, Manual, and Modality Logs for complete treatment.     Assessment/Plan  Salud cont to exhibit R trunk shift in standing, requires min verbal cues to avoid hip add/IR. However, in terms of functional mobility she is doing exceptionally well.   Anticipate DC next Visit         Timed:         Manual Therapy:          mins  80101;     Therapeutic Exercise:    20     mins  62887;     Neuromuscular Cordell:    18    mins  92314;    Therapeutic Activity:          mins  05292;     Gait Trainin     mins  06345;     Ultrasound:          mins  67393;    Ionto                                   mins   49682  Self Care                            mins   58588  Canalith Repos                   mins  94542    Un-Timed:  Electrical Stimulation:         mins  53315 ( );  Dry Needling          mins 88450/68450  Traction          mins 56096  Low Eval          Mins  30767  Mod Eval          Mins  80603  High Eval                            Mins  77088  Re-Eval                               mins  85849    Timed Treatment:   46   mins   Total Treatment:     46   mins    Cherie Brown, PT, DPT, cert. DN  Physical Therapist  [place] Lic # [id number]

## 2022-06-30 ENCOUNTER — TREATMENT (OUTPATIENT)
Dept: PHYSICAL THERAPY | Facility: CLINIC | Age: 77
End: 2022-06-30

## 2022-06-30 DIAGNOSIS — M62.58 MUSCLE ATROPHY OF LOWER EXTREMITY: ICD-10-CM

## 2022-06-30 DIAGNOSIS — M25.562 CHRONIC PAIN OF LEFT KNEE: ICD-10-CM

## 2022-06-30 DIAGNOSIS — R26.81 UNSTEADY GAIT WHEN WALKING: ICD-10-CM

## 2022-06-30 DIAGNOSIS — Z96.652 HISTORY OF LEFT KNEE REPLACEMENT: ICD-10-CM

## 2022-06-30 DIAGNOSIS — Z96.652 S/P TKR (TOTAL KNEE REPLACEMENT), LEFT: Primary | ICD-10-CM

## 2022-06-30 DIAGNOSIS — R26.2 DIFFICULTY WALKING: ICD-10-CM

## 2022-06-30 DIAGNOSIS — G89.29 CHRONIC PAIN OF LEFT KNEE: ICD-10-CM

## 2022-06-30 DIAGNOSIS — M25.662 KNEE STIFFNESS, LEFT: ICD-10-CM

## 2022-06-30 PROCEDURE — 97110 THERAPEUTIC EXERCISES: CPT | Performed by: PHYSICAL THERAPIST

## 2022-06-30 PROCEDURE — 97116 GAIT TRAINING THERAPY: CPT | Performed by: PHYSICAL THERAPIST

## 2022-06-30 NOTE — PROGRESS NOTES
Physical Therapy Daily Progress Note/DC      Patient: Salud Gama   : 1945  Diagnosis/ICD-10 Code:  S/P TKR (total knee replacement), left [Z96.652]   Problems Addressed this Visit    None     Visit Diagnoses     S/P TKR (total knee replacement), left    -  Primary    Knee stiffness, left        Difficulty walking        History of left knee replacement        Chronic pain of left knee        Muscle atrophy of lower extremity        Unsteady gait when walking          Diagnoses       Codes Comments    S/P TKR (total knee replacement), left    -  Primary ICD-10-CM: Z96.652  ICD-9-CM: V43.65     Knee stiffness, left     ICD-10-CM: M25.662  ICD-9-CM: 719.56     Difficulty walking     ICD-10-CM: R26.2  ICD-9-CM: 719.7     History of left knee replacement     ICD-10-CM: Z96.652  ICD-9-CM: V43.65     Chronic pain of left knee     ICD-10-CM: M25.562, G89.29  ICD-9-CM: 719.46, 338.29     Muscle atrophy of lower extremity     ICD-10-CM: M62.58  ICD-9-CM: 728.2     Unsteady gait when walking     ICD-10-CM: R26.81  ICD-9-CM: 781.2         Referring practitioner: Bret Schafer PA-C  Date of Initial Visit: Type: THERAPY  Noted: 2022  Today's Date: 2022    VISIT#: 11    Subjective   Salud reports she's doing well and plans to continue with her HEP. She feels confident about DC today.     Objective     See Exercise, Manual, and Modality Logs for complete treatment.     Assessment/Plan  See recent PN,   Plan to DC with HEP         Timed:         Manual Therapy:         mins  75073;     Therapeutic Exercise:    22     mins  98322;     Neuromuscular Cordell:    6    mins  13067;    Therapeutic Activity:          mins  74684;     Gait Training:      10     mins  78522;     Ultrasound:          mins  17666;    Ionto                                   mins   13905  Self Care                            mins   22298  Canalith Repos                   mins  00555    Un-Timed:  Electrical Stimulation:         mins  41140 (  );  Dry Needling          mins 27460/34915  Traction          mins 38528  Low Eval          Mins  32366  Mod Eval          Mins  47337  High Eval                            Mins  46488  Re-Eval                               mins  09111    Timed Treatment:   38   mins   Total Treatment:     38   mins    Cherie Brown, PT, DPT, cert. DN  Physical Therapist  [place] Lic # [id number]

## 2022-07-19 ENCOUNTER — TELEPHONE (OUTPATIENT)
Dept: FAMILY MEDICINE CLINIC | Facility: CLINIC | Age: 77
End: 2022-07-19

## 2022-07-19 NOTE — TELEPHONE ENCOUNTER
Spoke with Miranda-daughter. Miranda stated that she just returned from [place] so she has been unavailable to help her mother perform the kit due to her dementia. She stated that they would try to complete it as soon as they could.

## 2022-07-26 ENCOUNTER — HOSPITAL ENCOUNTER (OUTPATIENT)
Dept: MAMMOGRAPHY | Facility: HOSPITAL | Age: 77
Discharge: HOME | End: 2022-07-26
Admitting: FAMILY MEDICINE

## 2022-07-26 DIAGNOSIS — Z12.31 SCREENING MAMMOGRAM, ENCOUNTER FOR: ICD-10-CM

## 2022-07-26 PROCEDURE — 77067 SCR MAMMO BI INCL CAD: CPT

## 2022-07-26 PROCEDURE — 77063 BREAST TOMOSYNTHESIS BI: CPT

## 2022-08-17 ENCOUNTER — OFFICE (OUTPATIENT)
Dept: URBAN - METROPOLITAN AREA CLINIC 64 | Facility: CLINIC | Age: 77
End: 2022-08-17

## 2022-08-17 VITALS
WEIGHT: 130 LBS | HEART RATE: 69 BPM | HEIGHT: 62 IN | DIASTOLIC BLOOD PRESSURE: 66 MMHG | SYSTOLIC BLOOD PRESSURE: 95 MMHG

## 2022-08-17 DIAGNOSIS — K83.9 DISEASE OF BILIARY TRACT, UNSPECIFIED: ICD-10-CM

## 2022-08-17 PROCEDURE — 99214 OFFICE O/P EST MOD 30 MIN: CPT | Performed by: INTERNAL MEDICINE

## 2022-08-26 NOTE — PROGRESS NOTES
"Chief Complaint  Follow-up (Memory)    Subjective          Salud Gama presents to [facility] for Memory  History of Present Illness    F/U memory patient daughter states her memory is a little worse since last visit, her last MMSE on 6/13/22 was 22/30, she is doing more puzzles and still pulling weeds out of garden patient is currently taking namenda 10 mg 1 bid.    Pt. cooks some.    Sleeping is ok.     No hallucinations.     ====previous ov 8/30/21 dr Seipel=====  Patient is here for yearly f/u on memory loss. Patient and daughter states her memory is a little worst since last visit only short term, long term memory seems to still be good.patient and daughter denies any Hallucinations or behavior incidents. She is currently taking Namenda 10mg bid and seems to be tolerating medication well.     Vit b12 two years ago was 202     Mri brain 2017 showed minimal white matter change, personally reviewed     Maximum   Score Patient's   Score Questions   5 4 \"What is the year?Season?Date?Day of the week?Month?\"   5 3 \"Where are we now: State?County?Town/city?Hospital?Floor?\"   3 3 3 Unrelated objects Number of trials:___   5 5 Count backward from 100 by sevens or spell WORLD backwards   3 3 Name 3 things from above   2 1 Identify 2 objects   1 0 Repeat the phrase: No ifs, ands,or buts.   3 3 Take paper in right hand, fold it in half, and put it on the floor.   1 1 Please read this and do what it says. \"Close your eyes\"   1 1 Make up and write a sentence about anything. Noun and verb   1 1 Copy this picture 10 angles must be present.   30 25 Total MMSE         Current Outpatient Medications:   •  Cholecalciferol (VITAMIN D) 25 MCG (1000 UT) tablet, Take 1 tablet by mouth Daily., Disp: , Rfl:   •  citalopram (CeleXA) 20 MG tablet, Take 1 tablet by mouth once daily, Disp: 90 tablet, Rfl: 3  •  memantine (NAMENDA) 10 MG tablet, Take 1 tablet by mouth twice daily, Disp: 60 tablet, Rfl: 5  •  " "omeprazole (priLOSEC) 20 MG capsule, Take 1 capsule by mouth Daily., Disp: 90 capsule, Rfl: 3  •  Probiotic Product (PROBIOTIC-10 PO), Take 1 capsule by mouth Every Morning., Disp: , Rfl:   •  vitamin B-12 (CYANOCOBALAMIN) 100 MCG tablet, Take 50 mcg by mouth Daily., Disp: , Rfl:     Review of Systems   Genitourinary: Positive for frequency.   Psychiatric/Behavioral: Positive for confusion and decreased concentration. The patient is nervous/anxious.    All other systems reviewed and are negative.         Objective:    Vital Signs:   BP (!) 89/57   Pulse 109   Temp 98.6 °F (37 °C) (Infrared)   Ht 157.5 cm (62\")   Wt 59.4 kg (131 lb)   BMI 23.96 kg/m²     Physical Exam  Vitals reviewed.   HENT:      Head: Normocephalic.      Mouth/Throat:      Mouth: Mucous membranes are moist.   Eyes:      Pupils: Pupils are equal, round, and reactive to light.   Cardiovascular:      Rate and Rhythm: Normal rate.      Pulses: Normal pulses.   Pulmonary:      Effort: Pulmonary effort is normal. No respiratory distress.   Skin:     General: Skin is warm.   Neurological:      General: No focal deficit present.      Mental Status: She is alert.   Psychiatric:         Mood and Affect: Mood normal.        Result Review :                Neurologic Exam     Cranial Nerves     CN III, IV, VI   Pupils are equal, round, and reactive to light.        Assessment and Plan    Diagnoses and all orders for this visit:    1. Late onset Alzheimer's dementia without behavioral disturbance (HCC) (Primary)     continue Namenda, start the Exelon patch.       Follow Up   Return in about 6 months (around 2/28/2023).  Patient was given instructions and counseling regarding her condition or for health maintenance advice. Please see specific information pulled into the AVS if appropriate.     This document has been electronically signed by Joseph Seipel, MD on August 30, 2022 10:50 EDT      "

## 2022-08-30 ENCOUNTER — OFFICE VISIT (OUTPATIENT)
Dept: NEUROLOGY | Facility: CLINIC | Age: 77
End: 2022-08-30

## 2022-08-30 VITALS
HEART RATE: 109 BPM | WEIGHT: 131 LBS | SYSTOLIC BLOOD PRESSURE: 89 MMHG | DIASTOLIC BLOOD PRESSURE: 57 MMHG | HEIGHT: 62 IN | TEMPERATURE: 98.6 F | BODY MASS INDEX: 24.11 KG/M2

## 2022-08-30 DIAGNOSIS — G30.1 LATE ONSET ALZHEIMER'S DEMENTIA WITHOUT BEHAVIORAL DISTURBANCE: Primary | ICD-10-CM

## 2022-08-30 DIAGNOSIS — F02.80 LATE ONSET ALZHEIMER'S DEMENTIA WITHOUT BEHAVIORAL DISTURBANCE: Primary | ICD-10-CM

## 2022-08-30 PROCEDURE — 99214 OFFICE O/P EST MOD 30 MIN: CPT | Performed by: PSYCHIATRY & NEUROLOGY

## 2022-08-30 RX ORDER — RIVASTIGMINE 4.6 MG/24H
1 PATCH, EXTENDED RELEASE TRANSDERMAL DAILY
Qty: 30 PATCH | Refills: 3 | Status: SHIPPED | OUTPATIENT
Start: 2022-08-30 | End: 2022-09-07

## 2022-08-30 RX ORDER — MEMANTINE HYDROCHLORIDE 10 MG/1
10 TABLET ORAL 2 TIMES DAILY
Qty: 180 TABLET | Refills: 3 | Status: SHIPPED | OUTPATIENT
Start: 2022-08-30

## 2022-09-06 ENCOUNTER — PATIENT MESSAGE (OUTPATIENT)
Dept: NEUROLOGY | Facility: CLINIC | Age: 77
End: 2022-09-06

## 2022-09-06 DIAGNOSIS — G30.1 LATE ONSET ALZHEIMER'S DEMENTIA WITHOUT BEHAVIORAL DISTURBANCE: Primary | ICD-10-CM

## 2022-09-06 DIAGNOSIS — F02.80 LATE ONSET ALZHEIMER'S DEMENTIA WITHOUT BEHAVIORAL DISTURBANCE: Primary | ICD-10-CM

## 2022-09-07 RX ORDER — RIVASTIGMINE 4.6 MG/24H
1 PATCH, EXTENDED RELEASE TRANSDERMAL DAILY
Qty: 90 PATCH | Refills: 3 | Status: SHIPPED | OUTPATIENT
Start: 2022-09-07 | End: 2023-02-28 | Stop reason: DRUGHIGH

## 2022-09-07 NOTE — TELEPHONE ENCOUNTER
From: Salud Gama  To: Joseph F Seipel, MD  Sent: 9/6/2022 3:52 PM EDT  Subject: Rivastigmine    [employer] denied covering the Rivastigmine patch prescription. Mom is willing to pay out of pocket through [employer]. Please resend the prescription for 90 day supply to [facility] on [address] Their pricing is better than her regular pharmacy.     Thank you,  Miranda Solorzano/Salud’s daughter

## 2022-09-09 ENCOUNTER — TELEPHONE (OUTPATIENT)
Dept: NEUROLOGY | Facility: CLINIC | Age: 77
End: 2022-09-09

## 2022-09-09 NOTE — TELEPHONE ENCOUNTER
IN PHONE NOTE PATIENT AWARE RX RIVASTIGMINE WAS DENIED, so she was going to use [facility] for the rx. Need to know if she picked it up because we received another PA request.

## 2022-10-08 ENCOUNTER — FLU SHOT (OUTPATIENT)
Dept: FAMILY MEDICINE CLINIC | Facility: CLINIC | Age: 77
End: 2022-10-08

## 2022-10-08 DIAGNOSIS — Z23 NEED FOR INFLUENZA VACCINATION: Primary | ICD-10-CM

## 2022-10-08 PROCEDURE — 90662 IIV NO PRSV INCREASED AG IM: CPT | Performed by: NURSE PRACTITIONER

## 2022-10-08 PROCEDURE — G0008 ADMIN INFLUENZA VIRUS VAC: HCPCS | Performed by: NURSE PRACTITIONER

## 2023-02-28 DIAGNOSIS — G30.1 LATE ONSET ALZHEIMER'S DEMENTIA WITHOUT BEHAVIORAL DISTURBANCE: ICD-10-CM

## 2023-02-28 DIAGNOSIS — F02.80 LATE ONSET ALZHEIMER'S DEMENTIA WITHOUT BEHAVIORAL DISTURBANCE: ICD-10-CM

## 2023-02-28 RX ORDER — RIVASTIGMINE 9.5 MG/24H
1 PATCH, EXTENDED RELEASE TRANSDERMAL DAILY
Qty: 90 PATCH | Refills: 3 | Status: SHIPPED | OUTPATIENT
Start: 2023-02-28

## 2023-04-08 DIAGNOSIS — F41.9 ANXIETY: ICD-10-CM

## 2023-04-08 DIAGNOSIS — K21.9 GASTROESOPHAGEAL REFLUX DISEASE WITHOUT ESOPHAGITIS: ICD-10-CM

## 2023-04-10 ENCOUNTER — TELEPHONE (OUTPATIENT)
Dept: FAMILY MEDICINE CLINIC | Facility: CLINIC | Age: 78
End: 2023-04-10
Payer: MEDICARE

## 2023-04-10 RX ORDER — CITALOPRAM 20 MG/1
20 TABLET ORAL DAILY
Qty: 90 TABLET | Refills: 3 | Status: SHIPPED | OUTPATIENT
Start: 2023-04-10

## 2023-04-10 RX ORDER — OMEPRAZOLE 20 MG/1
20 CAPSULE, DELAYED RELEASE ORAL DAILY
Qty: 90 CAPSULE | Refills: 3 | Status: SHIPPED | OUTPATIENT
Start: 2023-04-10

## 2023-06-12 RX ORDER — RIVASTIGMINE 9.5 MG/24H
1 PATCH, EXTENDED RELEASE TRANSDERMAL DAILY
Qty: 90 PATCH | Refills: 3 | Status: SHIPPED | OUTPATIENT
Start: 2023-06-12

## 2023-07-24 DIAGNOSIS — R35.0 URINARY FREQUENCY: Primary | ICD-10-CM

## 2023-07-24 RX ORDER — NITROFURANTOIN 25; 75 MG/1; MG/1
100 CAPSULE ORAL 2 TIMES DAILY
Qty: 14 CAPSULE | Refills: 0 | Status: SHIPPED | OUTPATIENT
Start: 2023-07-24

## 2023-07-28 ENCOUNTER — HOSPITAL ENCOUNTER (OUTPATIENT)
Dept: MAMMOGRAPHY | Facility: HOSPITAL | Age: 78
Discharge: HOME | End: 2023-07-28
Admitting: FAMILY MEDICINE
Payer: MEDICARE

## 2023-07-28 DIAGNOSIS — Z12.31 ENCOUNTER FOR SCREENING MAMMOGRAM FOR MALIGNANT NEOPLASM OF BREAST: ICD-10-CM

## 2023-07-28 PROCEDURE — 77063 BREAST TOMOSYNTHESIS BI: CPT

## 2023-07-28 PROCEDURE — 77067 SCR MAMMO BI INCL CAD: CPT

## 2023-07-28 NOTE — PROGRESS NOTES
[employer] message was sent  Good morning we have your mammogram back and per Dr. Broderick   The breasts are heterogenously dense, There are no suspicious masses  No mammographic signs of malignancy. Recommend routine mammographic  Screening in 1 year.

## 2023-08-13 ENCOUNTER — PATIENT MESSAGE (OUTPATIENT)
Dept: FAMILY MEDICINE CLINIC | Facility: CLINIC | Age: 78
End: 2023-08-13
Payer: MEDICARE

## 2023-08-15 ENCOUNTER — CLINICAL SUPPORT (OUTPATIENT)
Dept: FAMILY MEDICINE CLINIC | Facility: CLINIC | Age: 78
End: 2023-08-15
Payer: MEDICARE

## 2023-08-15 DIAGNOSIS — R35.0 URINARY FREQUENCY: Primary | ICD-10-CM

## 2023-08-15 DIAGNOSIS — N30.01 ACUTE CYSTITIS WITH HEMATURIA: ICD-10-CM

## 2023-08-15 LAB
BILIRUB BLD-MCNC: NEGATIVE MG/DL
CLARITY, POC: ABNORMAL
COLOR UR: YELLOW
GLUCOSE UR STRIP-MCNC: NEGATIVE MG/DL
KETONES UR QL: ABNORMAL
LEUKOCYTE EST, POC: ABNORMAL
NITRITE UR-MCNC: POSITIVE MG/ML
PH UR: 6.5 [PH] (ref 5–8)
PROT UR STRIP-MCNC: ABNORMAL MG/DL
RBC # UR STRIP: ABNORMAL /UL
SP GR UR: 1.01 (ref 1–1.03)
UROBILINOGEN UR QL: NORMAL

## 2023-08-15 PROCEDURE — 81002 URINALYSIS NONAUTO W/O SCOPE: CPT | Performed by: FAMILY MEDICINE

## 2023-08-15 RX ORDER — SULFAMETHOXAZOLE AND TRIMETHOPRIM 800; 160 MG/1; MG/1
1 TABLET ORAL 2 TIMES DAILY
Qty: 20 TABLET | Refills: 0 | Status: SHIPPED | OUTPATIENT
Start: 2023-08-15

## 2023-08-19 LAB
BACTERIA UR CULT: ABNORMAL
BACTERIA UR CULT: ABNORMAL
OTHER ANTIBIOTIC SUSC ISLT: ABNORMAL

## 2023-08-21 NOTE — PROGRESS NOTES
Chart reviewed. Last Colonoscopy on 9/22/2017 with Dr. Mosley. Recommend repeat  5 years.     Please call pt to schedule Colonoscopy with Dr. Peck.      Schedule Procedure:   Please Schedule Routine (next available or patient preference)    Procedure: Colonoscopy (52407) with Nulytely ABNORMAL KIDNEY FUNCTION-NO SUPREP    Patient has constipation    Diagnosis: History of Colon Polyps Z86.010     Is patient:  · Diabetic? Yes: Hold oral medications day of procedure  · ANTIPLATELET / ANTICOAGULATION: None    Latex allergy: No     Sleep apnea: No     BMI 31.92    Location: Patient Preference    Special Instructions:   MAC Anesthesia  S/P Gastric Sleeve surgery    Covid Vaccine: due for booster  Patient is NOT immunocompromised   COVID 19 Testing Ordered    Hold Iron for 7 days prior to procedure     [employer] message was sent   Good afternoon we have your urine culture back and per Dr. Broderick   Your urine culture has grown a germ but this is not unusual. The medications that have been sent prior was enough to treat as well as the medication that was sent when you was in would be enough to treat the infection.   As long as not having any symptoms then  do not start the antibiotic that was sent as we do not want you to create a resistance from antibiotics especially for when they are really needed.

## 2023-08-30 ENCOUNTER — OFFICE VISIT (OUTPATIENT)
Dept: NEUROLOGY | Facility: CLINIC | Age: 78
End: 2023-08-30
Payer: MEDICARE

## 2023-08-30 VITALS
WEIGHT: 135 LBS | BODY MASS INDEX: 24.84 KG/M2 | SYSTOLIC BLOOD PRESSURE: 100 MMHG | DIASTOLIC BLOOD PRESSURE: 65 MMHG | HEART RATE: 65 BPM | HEIGHT: 62 IN

## 2023-08-30 DIAGNOSIS — F02.80 EARLY ONSET ALZHEIMER'S DEMENTIA WITHOUT BEHAVIORAL DISTURBANCE: Primary | ICD-10-CM

## 2023-08-30 DIAGNOSIS — G30.0 EARLY ONSET ALZHEIMER'S DEMENTIA WITHOUT BEHAVIORAL DISTURBANCE: Primary | ICD-10-CM

## 2023-08-30 PROCEDURE — 1160F RVW MEDS BY RX/DR IN RCRD: CPT | Performed by: PSYCHIATRY & NEUROLOGY

## 2023-08-30 PROCEDURE — 99214 OFFICE O/P EST MOD 30 MIN: CPT | Performed by: PSYCHIATRY & NEUROLOGY

## 2023-08-30 PROCEDURE — 1159F MED LIST DOCD IN RCRD: CPT | Performed by: PSYCHIATRY & NEUROLOGY

## 2023-08-30 RX ORDER — MEMANTINE HYDROCHLORIDE 10 MG/1
10 TABLET ORAL 2 TIMES DAILY
Qty: 180 TABLET | Refills: 3 | Status: SHIPPED | OUTPATIENT
Start: 2023-08-30

## 2023-08-30 RX ORDER — RIVASTIGMINE 13.3 MG/24H
1 PATCH, EXTENDED RELEASE TRANSDERMAL DAILY
Qty: 30 PATCH | Refills: 11 | Status: SHIPPED | OUTPATIENT
Start: 2023-08-30

## 2023-08-30 NOTE — PROGRESS NOTES
"Chief Complaint  Memory Loss    Subjective          Salud Gama presents to [facility]UR[facility] for memory  History of Present Illness  Patient is here for follow up on memory  Medication- currently taking namenda 10 mg bid    Memory is a little worse since last visit.  She tolerated increase in rivastigmine dose         Maximum   Score Patient's   Score Questions   5 3 \"What is the year?Season?Date?Day of the week?Month?\"   5 3 \"Where are we now: State?County?Town/city?Hospital?Floor?\"   3 3 3 Unrelated objects Number of trials:___   5 5 Count backward from 100 by sevens or spell WORLD backwards   3 0 Name 3 things from above   2 2 Identify 2 objects   1 1 Repeat the phrase: No ifs, ands,or buts.   3 2 Take paper in right hand, fold it in half, and put it on the floor.   1 0 Please read this and do what it says. \"Close your eyes\"   1 1 Make up and write a sentence about anything. Noun and verb   1 1 Copy this picture 10 angles must be present.   30 21 Total MMSE            ===================================================================  8- Previous Office Visit Dr. Seipel  F/U memory patient daughter states her memory is a little worse since last visit, her last MMSE on 6/13/22 was 22/30, she is doing more puzzles and still pulling weeds out of garden patient is currently taking namenda 10 mg 1 bid.     Pt. cooks some.     Sleeping is ok.      No hallucinations.     Current Outpatient Medications:     Calcium Citrate 333 MG tablet, , Disp: , Rfl:     Cholecalciferol (VITAMIN D) 25 MCG (1000 UT) tablet, Take 1 tablet by mouth Daily., Disp: , Rfl:     citalopram (CeleXA) 20 MG tablet, Take 1 tablet by mouth Daily., Disp: 90 tablet, Rfl: 3    omeprazole (priLOSEC) 20 MG capsule, Take 1 capsule by mouth Daily., Disp: 90 capsule, Rfl: 3    Probiotic Product (PROBIOTIC-10 PO), Take 1 capsule by mouth Every Morning., Disp: , Rfl:     Sennosides-Docusate Sodium (Stool Softener/Laxative) " "8.6-50 MG per capsule, , Disp: , Rfl:     sulfamethoxazole-trimethoprim (BACTRIM DS,SEPTRA DS) 800-160 MG per tablet, Take 1 tablet by mouth 2 (Two) Times a Day., Disp: 20 tablet, Rfl: 0    vitamin B-12 (CYANOCOBALAMIN) 100 MCG tablet, Take 0.5 tablets by mouth Daily., Disp: , Rfl:     memantine (NAMENDA) 10 MG tablet, Take 1 tablet by mouth 2 (Two) Times a Day., Disp: 180 tablet, Rfl: 3    rivastigmine (EXELON) 13.3 MG/24HR patch, Place 1 patch on the skin as directed by provider Daily., Disp: 30 patch, Rfl: 11    Review of Systems   Psychiatric/Behavioral:  Positive for confusion. The patient is nervous/anxious.    All other systems reviewed and are negative.       Objective:    Vital Signs:   /65 (BP Location: Right arm, Patient Position: Sitting)   Pulse 65   Ht 157.5 cm (62\")   Wt 61.2 kg (135 lb)   BMI 24.69 kg/mý     Physical Exam  Vitals reviewed.   Cardiovascular:      Rate and Rhythm: Normal rate.   Pulmonary:      Effort: Pulmonary effort is normal. No respiratory distress.   Neurological:      General: No focal deficit present.      Mental Status: She is alert and oriented to person, place, and time.   Psychiatric:         Mood and Affect: Mood normal.      Result Review :                Neurologic Exam     Mental Status   Oriented to person, place, and time.       Assessment and Plan    Diagnoses and all orders for this visit:    1. Early onset Alzheimer's dementia without behavioral disturbance (Primary)  Overview:  23 of 30 06/16/2023  22 of 30 on mental status exam 06/13/2022  28 of 30 On mental status exam 02.24.2021,   Her sxs remain stable per her daughter.  Continue meds compliant    Orders:  -     rivastigmine (EXELON) 13.3 MG/24HR patch; Place 1 patch on the skin as directed by provider Daily.  Dispense: 30 patch; Refill: 11  -     memantine (NAMENDA) 10 MG tablet; Take 1 tablet by mouth 2 (Two) Times a Day.  Dispense: 180 tablet; Refill: 3     Continue namenda 10mg bid, and increase " rivastigmine to max dose.     Follow Up   Return in about 1 year (around 8/30/2024).  Patient was given instructions and counseling regarding her condition or for health maintenance advice. Please see specific information pulled into the AVS if appropriate.     This document has been electronically signed by Joseph Seipel, MD on August 30, 2023 10:50 EDT

## 2023-10-08 ENCOUNTER — PATIENT MESSAGE (OUTPATIENT)
Dept: FAMILY MEDICINE CLINIC | Facility: CLINIC | Age: 78
End: 2023-10-08
Payer: MEDICARE

## 2023-10-10 DIAGNOSIS — N30.01 ACUTE CYSTITIS WITH HEMATURIA: ICD-10-CM

## 2023-10-10 RX ORDER — SULFAMETHOXAZOLE AND TRIMETHOPRIM 800; 160 MG/1; MG/1
1 TABLET ORAL 2 TIMES DAILY
Qty: 20 TABLET | Refills: 0 | Status: SHIPPED | OUTPATIENT
Start: 2023-10-10

## 2023-10-16 ENCOUNTER — OFFICE (OUTPATIENT)
Dept: URBAN - METROPOLITAN AREA CLINIC 64 | Facility: CLINIC | Age: 78
End: 2023-10-16

## 2023-10-16 VITALS
HEART RATE: 76 BPM | SYSTOLIC BLOOD PRESSURE: 123 MMHG | HEIGHT: 62 IN | WEIGHT: 136 LBS | DIASTOLIC BLOOD PRESSURE: 84 MMHG

## 2023-10-16 DIAGNOSIS — K83.9 DISEASE OF BILIARY TRACT, UNSPECIFIED: ICD-10-CM

## 2023-10-16 PROCEDURE — 99213 OFFICE O/P EST LOW 20 MIN: CPT | Performed by: INTERNAL MEDICINE

## 2023-10-18 NOTE — PROGRESS NOTES
Chief Complaint  No chief complaint on file.    Subjective     CC  Problem List  Visit Diagnosis   Encounters  Notes  Medications  Labs  Result Review Imaging  Media    Salud Gama presents to [facility] for   History of Present Illness  Skin Lesion:   Patient complains of a skin lesion of the neck. The lesion has been present for 10 years. Lesions have recently increased in size and have become darker in color. . Symptoms associated with the lesions are: increasing number of lesions. She reports itching. There is also a single lesion on her left cheek that has become lighter in color and is variegated. It too itches.       Review of Systems   Constitutional:  Negative for fever.   Respiratory:  Negative for shortness of breath.    Cardiovascular:  Negative for chest pain.   Skin:  Positive for skin lesions.        Objective   Vital Signs:   There were no vitals taken for this visit.    Physical Exam  Constitutional:       General: She is not in acute distress.  Cardiovascular:      Rate and Rhythm: Normal rate and regular rhythm.      Heart sounds: No murmur heard.  Pulmonary:      Effort: Pulmonary effort is normal.      Breath sounds: Normal breath sounds.   Skin:     Findings: Lesion (There are numerous skin colored papules about the neck 10 consistent with skin tags.) present.   Neurological:      Mental Status: She is alert.        Result Review :Labs  Result Review  Imaging  Med Tab  Media          Skin Tag Removal    Date/Time: 10/20/2023 12:01 PM    Performed by: Virgie Broderick MD  Authorized by: Virgie Broderick MD  Preparation: Patient was prepped and draped in the usual sterile fashion.  Local anesthesia used: yes    Anesthesia:  Local anesthesia used: yes  Local Anesthetic: lidocaine 2% with epinephrine    Sedation:  Patient sedated: no    Patient tolerance: patient tolerated the procedure well with no immediate complications  Comments: Kristie had  10 dark papular skin tags about her neck. The were removed by shaving and cautery. She tolerated the procedure well. Wound care precautions were given. She will follow up prn problems.       Skin Excision    Date/Time: 10/20/2023 12:03 PM    Performed by: Virgie Broderick MD  Authorized by: Virgie Broderick MD    Consent:     Consent obtained:  Verbal    Consent given by:  Patient    Risks discussed:  Bleeding, infection and poor cosmetic result    Alternatives discussed:  Referral  Pre-procedure details:     Preparation: Patient was prepped and draped in usual sterile fashion    Anesthesia:     Anesthesia method:  Local infiltration    Local anesthetic:  Lidocaine 2% WITH epi  Procedure details:     Head/Neck Location:  Right ear (There is a 6 mm irregular dry varing pigmented lightly lesion over the center of the right cheek consistent with a SK.)    Malignancy: benign lesion      Skin lesion 1 size (mm): 6 mm.    Final defect size (mm):  6 (mm)  Post-procedure details:     Patient tolerance of procedure:  Tolerated well, no immediate complications  Comments:      The 6 mm lesion over the center of the left cheek was cleansed with betadine anesthestized with 2 % lidocaine with epinephrine. Under sterile conditions the lesion was excised completely by shaving. Bleed was minimal and controlled with cautery. The specimen was sent for pathology. She tolerated the procedure well and left the office in stable condition.         Assessment and Plan CC Problem List  Visit Diagnosis  ROS  Review (Popup)  Health Maintenance  Quality  BestPractice  Medications  SmartSets  SnapShot Encounters  Media  Problem List Items Addressed This Visit    None      Follow Up Instructions Charge Capture  Follow-up Communications  No follow-ups on file.  Patient was given instructions and counseling regarding her condition or for health maintenance advice. Please see specific information pulled into the AVS if appropriate.    Answers submitted by the patient for this visit:  Other (Submitted on 10/16/2023)  Please describe your symptoms.: Removal of skin tags on neck., , Flu shot if feasible  Have you had these symptoms before?: Yes  How long have you been having these symptoms?: Greater than 2 weeks  Please list any medications you are currently taking for this condition.: None  Please describe any probable cause for these symptoms. : Age related  Primary Reason for Visit (Submitted on 10/16/2023)  What is the primary reason for your visit?: Other

## 2023-10-20 ENCOUNTER — OFFICE VISIT (OUTPATIENT)
Dept: FAMILY MEDICINE CLINIC | Facility: CLINIC | Age: 78
End: 2023-10-20
Payer: MEDICARE

## 2023-10-20 VITALS
OXYGEN SATURATION: 97 % | BODY MASS INDEX: 24.73 KG/M2 | TEMPERATURE: 97.3 F | DIASTOLIC BLOOD PRESSURE: 78 MMHG | WEIGHT: 134.4 LBS | RESPIRATION RATE: 18 BRPM | HEART RATE: 94 BPM | SYSTOLIC BLOOD PRESSURE: 114 MMHG | HEIGHT: 62 IN

## 2023-10-20 DIAGNOSIS — Z23 NEED FOR INFLUENZA VACCINATION: ICD-10-CM

## 2023-10-20 DIAGNOSIS — L82.1 OTHER SEBORRHEIC KERATOSIS: Primary | ICD-10-CM

## 2023-10-20 DIAGNOSIS — K64.4 SKIN TAGS, ANUS OR RECTUM: ICD-10-CM

## 2023-10-25 NOTE — PROGRESS NOTES
[employer] message was sent   Good afternoon we have your Histopathology back and per Dr. Broderick   The place that was removed has come back as an irritated Seborrheic keratosis. This is a bunch of benign skin cells. This was a normal place. This is good. If you are to have any more issues with it please let us know.

## 2023-11-02 DIAGNOSIS — G30.0 EARLY ONSET ALZHEIMER'S DEMENTIA WITHOUT BEHAVIORAL DISTURBANCE: ICD-10-CM

## 2023-11-02 DIAGNOSIS — F02.80 EARLY ONSET ALZHEIMER'S DEMENTIA WITHOUT BEHAVIORAL DISTURBANCE: ICD-10-CM

## 2023-11-02 RX ORDER — RIVASTIGMINE 13.3 MG/24H
1 PATCH, EXTENDED RELEASE TRANSDERMAL DAILY
Qty: 90 PATCH | Refills: 3 | Status: SHIPPED | OUTPATIENT
Start: 2023-11-02

## 2023-11-11 DIAGNOSIS — F02.80 EARLY ONSET ALZHEIMER'S DEMENTIA WITHOUT BEHAVIORAL DISTURBANCE: ICD-10-CM

## 2023-11-11 DIAGNOSIS — G30.0 EARLY ONSET ALZHEIMER'S DEMENTIA WITHOUT BEHAVIORAL DISTURBANCE: ICD-10-CM

## 2023-11-11 RX ORDER — RIVASTIGMINE 13.3 MG/24H
1 PATCH, EXTENDED RELEASE TRANSDERMAL DAILY
Qty: 90 PATCH | Refills: 3 | Status: SHIPPED | OUTPATIENT
Start: 2023-11-11

## 2024-03-05 DIAGNOSIS — G30.0 EARLY ONSET ALZHEIMER'S DEMENTIA WITHOUT BEHAVIORAL DISTURBANCE: ICD-10-CM

## 2024-03-05 DIAGNOSIS — F02.80 EARLY ONSET ALZHEIMER'S DEMENTIA WITHOUT BEHAVIORAL DISTURBANCE: ICD-10-CM

## 2024-03-05 RX ORDER — RIVASTIGMINE 13.3 MG/24H
1 PATCH, EXTENDED RELEASE TRANSDERMAL DAILY
Qty: 90 PATCH | Refills: 3 | Status: SHIPPED | OUTPATIENT
Start: 2024-03-05

## 2024-04-18 DIAGNOSIS — K21.9 GASTROESOPHAGEAL REFLUX DISEASE WITHOUT ESOPHAGITIS: ICD-10-CM

## 2024-04-19 RX ORDER — OMEPRAZOLE 20 MG/1
20 CAPSULE, DELAYED RELEASE ORAL DAILY
Qty: 90 CAPSULE | Refills: 3 | Status: SHIPPED | OUTPATIENT
Start: 2024-04-19

## 2024-06-14 NOTE — PROGRESS NOTES
Chief Complaint  Medicare Wellness-subsequent, Heartburn, and Dementia    Subjective     CC  Problem List  Visit Diagnosis   Encounters  Notes  Medications  Labs  Result Review Imaging  Media    Salud Gama presents to [facility] for   Heartburn  She complains of heartburn. She reports no abdominal pain, no belching, no chest pain, no choking, no coughing, no dysphagia, no early satiety, no globus sensation, no hoarse voice, no nausea, no sore throat, no stridor, no tooth decay, no water brash or no wheezing. This is a chronic problem. The current episode started more than 1 year ago. The problem occurs occasionally. The problem has been gradually improving. The heartburn duration is less than a minute. The heartburn is of mild intensity. The heartburn does not wake her from sleep. The heartburn does not limit her activity. The heartburn doesn't change with position. The symptoms are aggravated by certain foods. Pertinent negatives include no anemia, fatigue, melena, muscle weakness, orthopnea or weight loss. There are no known risk factors. She has tried an antacid (She is on Omeprazole) for the symptoms. The treatment provided no relief. Past procedures do not include an abdominal ultrasound, an EGD, esophageal manometry, esophageal pH monitoring, H. pylori antibody titer or a UGI. Past invasive treatments do not include gastroplasty, gastroplication or reflux surgery.   Dementia  This is a chronic problem. The current episode started more than 1 year ago. The problem occurs daily. The problem has been gradually worsening. Associated symptoms include arthralgias (multiple). Pertinent negatives include no abdominal pain, anorexia, change in bowel habit, chest pain, chills, congestion, coughing, diaphoresis, fatigue, fever, headaches, joint swelling, myalgias, nausea, neck pain, numbness, rash, sore throat, swollen glands, urinary symptoms, vertigo, visual change,  vomiting or weakness. Associated symptoms comments: Daughter and patient both state that they feel her memory is worsening and they feel her dementia is worsening at times.   She sees her neurologist in late August   . Treatments tried: she is on celexa as well as exelon patch and memantine. The treatment provided mild relief.   Memory Loss  This is a chronic problem. The current episode started more than 1 year ago. The problem occurs constantly. The problem has been gradually worsening. Associated symptoms include arthralgias (multiple). Pertinent negatives include no abdominal pain, anorexia, change in bowel habit, chest pain, chills, congestion, coughing, diaphoresis, fatigue, fever, headaches, joint swelling, myalgias, nausea, neck pain, numbness, rash, sore throat, swollen glands, urinary symptoms, vertigo, visual change, vomiting or weakness. Nothing aggravates the symptoms. Treatments tried: Namenda. The treatment provided moderate relief.       Review of Systems   Constitutional:  Negative for chills, diaphoresis, fatigue, fever and unexpected weight loss.   HENT:  Negative for congestion, hearing loss, hoarse voice, sore throat and swollen glands.    Eyes:  Negative for visual disturbance.   Respiratory:  Negative for cough, choking and wheezing.    Cardiovascular:  Negative for chest pain.   Gastrointestinal:  Negative for abdominal pain, anorexia, change in bowel habit, constipation, diarrhea, dysphagia, melena, nausea and vomiting.   Endocrine: Negative for cold intolerance, heat intolerance, polydipsia and polyuria.   Genitourinary:  Negative for dysuria, frequency and pelvic pressure.   Musculoskeletal:  Positive for arthralgias (multiple). Negative for joint swelling, myalgias, muscle weakness and neck pain.   Skin:  Negative for rash.   Neurological:  Positive for memory problem. Negative for vertigo, weakness and numbness.   Hematological:  Negative for adenopathy. Does not bruise/bleed easily.  "  Psychiatric/Behavioral:  Negative for depressed mood. The patient is not nervous/anxious.         Objective   Vital Signs:   /60 (BP Location: Right arm, Patient Position: Sitting, Cuff Size: Adult)   Pulse 110   Temp 97.8 °F (36.6 °C) (Temporal)   Resp 18   Ht 154.9 cm (61\")   Wt 68.1 kg (150 lb 2 oz)   SpO2 97% Comment: room air  BMI 28.37 kg/m²     Physical Exam  Vitals reviewed.   Constitutional:       General: She is not in acute distress.     Appearance: She is well-developed.   HENT:      Right Ear: External ear normal.      Left Ear: External ear normal.      Mouth/Throat:      Pharynx: No oropharyngeal exudate.   Eyes:      General:         Right eye: No discharge.         Left eye: No discharge.      Conjunctiva/sclera: Conjunctivae normal.      Pupils: Pupils are equal, round, and reactive to light.   Neck:      Thyroid: No thyromegaly.      Vascular: No JVD.   Cardiovascular:      Rate and Rhythm: Normal rate and regular rhythm.      Pulses:           Carotid pulses are 2+ on the right side and 2+ on the left side.       Femoral pulses are 2+ on the right side and 2+ on the left side.       Dorsalis pedis pulses are 2+ on the right side and 2+ on the left side.      Heart sounds: Normal heart sounds. No murmur heard.     No friction rub. No gallop.   Pulmonary:      Effort: Pulmonary effort is normal. No respiratory distress.      Breath sounds: Normal breath sounds. No stridor. No wheezing or rales.   Chest:      Chest wall: No tenderness.   Abdominal:      General: Bowel sounds are normal. There is no distension.      Palpations: Abdomen is soft. There is no mass.      Tenderness: There is no abdominal tenderness. There is no guarding or rebound.      Hernia: No hernia is present.   Musculoskeletal:         General: Deformity (knees and distal fingers back, with decreased ROM.) present.   Lymphadenopathy:      Cervical: No cervical adenopathy.   Skin:     Findings: No erythema or rash. "   Neurological:      Mental Status: She is alert.      Cranial Nerves: No cranial nerve deficit.      Sensory: No sensory deficit.      Motor: No weakness or abnormal muscle tone.      Coordination: Coordination normal.      Deep Tendon Reflexes: Reflexes normal.      Comments: To person and place somewhat confused to time    Psychiatric:         Behavior: Behavior normal.         Thought Content: Thought content normal.         Judgment: Judgment normal.        Result Review :Labs  Result Review  Imaging  Med Tab  Media                 Assessment and Plan CC Problem List  Visit Diagnosis  ROS  Review (Popup)  Health Maintenance  Quality  BestPractice  Medications  SmartSets  SnapShot Encounters  Media  Problem List Items Addressed This Visit          High    Early onset Alzheimer's dementia without behavioral disturbance    Overview     22 of 30 06/18/2024 stable continue meds and neurology follow up  23 of 30 06/16/2023  22 of 30 on mental status exam 06/13/2022  28 of 30 On mental status exam 02.24.2021,   Her sxs remain stable per her daughter.  Continue meds compliant         Prediabetes    Overview     A1c 5.7 continue healthy diet and regular exercise repeat in 6 mos.             Low    Osteoarthritis    Overview     Multiple, stable with prn tylenol         Gastroesophageal reflux disease without esophagitis    Overview     Sxs controlled w/ PPI. Meds continued. Pros and cons and side effects discussed.             Unprioritized    Osteopenia of multiple sites    Overview     -1.2 spine, -3.2 FN, -3.7 total hip. Continue fosamax and advised about starting prolia repeat 2 years.   -1.4 spine, -2.0 FN, -2.7 hip 03/04/2021  -1.0 spine, -2.1 FN, -2.4 hip, 02/2019  Wt bearing to  exercise encouraged.  Continue Calcium w/ Vit D         Relevant Medications    alendronate (Fosamax) 70 MG tablet    Other Relevant Orders    DEXA Bone Density Axial (Completed)    Encounter for screening mammogram for  malignant neoplasm of breast    Overview     Normal mammogram 7/28/2023 heterogeneously dense and no mammographies signs of malignancy repeat in 1 year.   Last Mammogram 07/26/2022 fibroglandular density          Encounter for colorectal cancer screening    Overview     Cologuard negative 08/01/2022 repeat in 3 years 2025   given FHx father, try an avoid anesthesia given dementia.          Medicare annual wellness visit, subsequent - Primary    Overview     Healthy Diet exercise fall prevention and general safety discussed. Previous lab reviewed, we will notify her of today's lab. She lives with her , her daughter being her primary care giver they are doing a great job.         Relevant Orders    CBC & Differential (Completed)    Screening for malignant neoplasm of cervix    Overview     s/p BLAINE for cervical abnormalities.         Overweight (BMI 25.0-29.9)    Current Assessment & Plan     Patient's (Body mass index is 28.37 kg/m².) indicates that they are overweight with health conditions that include GERD and osteoarthritis . Weight is unchanged. BMI is is above average; BMI management plan is completed. We discussed portion control and increasing exercise.           Other Visit Diagnoses       Hyperglycemia        Relevant Orders    Hemoglobin A1c (Completed)    Lipid screening        Relevant Orders    Comprehensive Metabolic Panel (Completed)    Lipid Panel With / Chol / HDL Ratio (Completed)    Thyroid disorder screening        Relevant Orders    TSH (Completed)    Screening mammogram for breast cancer        Urinary frequency        Relevant Orders    POCT urinalysis dipstick, manual (Completed)    Urine Culture - Urine, Urine, Clean Catch (Completed)            Follow Up Instructions Charge Capture  Follow-up Communications  No follow-ups on file.  Patient was given instructions and counseling regarding her condition or for health maintenance advice. Please see specific information pulled into the  AVS if appropriate.

## 2024-06-18 ENCOUNTER — OFFICE VISIT (OUTPATIENT)
Dept: FAMILY MEDICINE CLINIC | Facility: CLINIC | Age: 79
End: 2024-06-18
Payer: MEDICARE

## 2024-06-18 VITALS
BODY MASS INDEX: 28.35 KG/M2 | HEART RATE: 110 BPM | WEIGHT: 150.13 LBS | HEIGHT: 61 IN | SYSTOLIC BLOOD PRESSURE: 110 MMHG | TEMPERATURE: 97.8 F | DIASTOLIC BLOOD PRESSURE: 60 MMHG | RESPIRATION RATE: 18 BRPM | OXYGEN SATURATION: 97 %

## 2024-06-18 DIAGNOSIS — R73.9 HYPERGLYCEMIA: ICD-10-CM

## 2024-06-18 DIAGNOSIS — R35.0 URINARY FREQUENCY: ICD-10-CM

## 2024-06-18 DIAGNOSIS — E66.3 OVERWEIGHT (BMI 25.0-29.9): ICD-10-CM

## 2024-06-18 DIAGNOSIS — M15.9 PRIMARY OSTEOARTHRITIS INVOLVING MULTIPLE JOINTS: ICD-10-CM

## 2024-06-18 DIAGNOSIS — K21.9 GASTROESOPHAGEAL REFLUX DISEASE WITHOUT ESOPHAGITIS: ICD-10-CM

## 2024-06-18 DIAGNOSIS — G30.0 EARLY ONSET ALZHEIMER'S DEMENTIA WITHOUT BEHAVIORAL DISTURBANCE: ICD-10-CM

## 2024-06-18 DIAGNOSIS — M85.89 OSTEOPENIA OF MULTIPLE SITES: ICD-10-CM

## 2024-06-18 DIAGNOSIS — Z13.220 LIPID SCREENING: ICD-10-CM

## 2024-06-18 DIAGNOSIS — F02.80 EARLY ONSET ALZHEIMER'S DEMENTIA WITHOUT BEHAVIORAL DISTURBANCE: ICD-10-CM

## 2024-06-18 DIAGNOSIS — Z12.4 SCREENING FOR MALIGNANT NEOPLASM OF CERVIX: ICD-10-CM

## 2024-06-18 DIAGNOSIS — Z00.00 MEDICARE ANNUAL WELLNESS VISIT, SUBSEQUENT: Primary | ICD-10-CM

## 2024-06-18 DIAGNOSIS — Z13.29 THYROID DISORDER SCREENING: ICD-10-CM

## 2024-06-18 DIAGNOSIS — Z12.11 ENCOUNTER FOR COLORECTAL CANCER SCREENING: ICD-10-CM

## 2024-06-18 DIAGNOSIS — Z12.31 ENCOUNTER FOR SCREENING MAMMOGRAM FOR MALIGNANT NEOPLASM OF BREAST: ICD-10-CM

## 2024-06-18 DIAGNOSIS — Z12.31 SCREENING MAMMOGRAM FOR BREAST CANCER: ICD-10-CM

## 2024-06-18 DIAGNOSIS — Z12.12 ENCOUNTER FOR COLORECTAL CANCER SCREENING: ICD-10-CM

## 2024-06-18 DIAGNOSIS — R73.03 PREDIABETES: ICD-10-CM

## 2024-06-18 LAB
BILIRUB BLD-MCNC: NEGATIVE MG/DL
CLARITY, POC: ABNORMAL
COLOR UR: YELLOW
GLUCOSE UR STRIP-MCNC: NEGATIVE MG/DL
KETONES UR QL: ABNORMAL
LEUKOCYTE EST, POC: ABNORMAL
NITRITE UR-MCNC: POSITIVE MG/ML
PH UR: 5 [PH] (ref 5–8)
PROT UR STRIP-MCNC: ABNORMAL MG/DL
RBC # UR STRIP: ABNORMAL /UL
SP GR UR: 1.01 (ref 1–1.03)
UROBILINOGEN UR QL: NORMAL

## 2024-06-18 RX ORDER — ALENDRONATE SODIUM 70 MG/1
70 TABLET ORAL
Qty: 12 TABLET | Refills: 3 | Status: SHIPPED | OUTPATIENT
Start: 2024-06-18

## 2024-06-18 RX ORDER — MIRABEGRON 25 MG/1
25 TABLET, FILM COATED, EXTENDED RELEASE ORAL DAILY
COMMUNITY
Start: 2023-12-01

## 2024-06-18 NOTE — ASSESSMENT & PLAN NOTE
Patient's (Body mass index is 28.37 kg/m².) indicates that they are overweight with health conditions that include GERD and osteoarthritis . Weight is unchanged. BMI is is above average; BMI management plan is completed. We discussed portion control and increasing exercise.

## 2024-06-19 LAB
ALBUMIN SERPL-MCNC: 4.2 G/DL (ref 3.8–4.8)
ALP SERPL-CCNC: 107 IU/L (ref 44–121)
ALT SERPL-CCNC: 18 IU/L (ref 0–32)
AST SERPL-CCNC: 23 IU/L (ref 0–40)
BASOPHILS # BLD AUTO: 0 X10E3/UL (ref 0–0.2)
BASOPHILS NFR BLD AUTO: 0 %
BILIRUB SERPL-MCNC: 0.4 MG/DL (ref 0–1.2)
BUN SERPL-MCNC: 23 MG/DL (ref 8–27)
BUN/CREAT SERPL: 29 (ref 12–28)
CALCIUM SERPL-MCNC: 9.1 MG/DL (ref 8.7–10.3)
CHLORIDE SERPL-SCNC: 106 MMOL/L (ref 96–106)
CHOLEST SERPL-MCNC: 206 MG/DL (ref 100–199)
CHOLEST/HDLC SERPL: 3.2 RATIO (ref 0–4.4)
CO2 SERPL-SCNC: 21 MMOL/L (ref 20–29)
CREAT SERPL-MCNC: 0.78 MG/DL (ref 0.57–1)
EGFRCR SERPLBLD CKD-EPI 2021: 77 ML/MIN/1.73
EOSINOPHIL # BLD AUTO: 0.2 X10E3/UL (ref 0–0.4)
EOSINOPHIL NFR BLD AUTO: 3 %
ERYTHROCYTE [DISTWIDTH] IN BLOOD BY AUTOMATED COUNT: 13 % (ref 11.7–15.4)
GLOBULIN SER CALC-MCNC: 2.4 G/DL (ref 1.5–4.5)
GLUCOSE SERPL-MCNC: 97 MG/DL (ref 70–99)
HBA1C MFR BLD: 5.7 % (ref 4.8–5.6)
HCT VFR BLD AUTO: 38.3 % (ref 34–46.6)
HDLC SERPL-MCNC: 65 MG/DL
HGB BLD-MCNC: 12.3 G/DL (ref 11.1–15.9)
IMM GRANULOCYTES # BLD AUTO: 0 X10E3/UL (ref 0–0.1)
IMM GRANULOCYTES NFR BLD AUTO: 0 %
LDLC SERPL CALC-MCNC: 119 MG/DL (ref 0–99)
LYMPHOCYTES # BLD AUTO: 1.5 X10E3/UL (ref 0.7–3.1)
LYMPHOCYTES NFR BLD AUTO: 22 %
MCH RBC QN AUTO: 29.6 PG (ref 26.6–33)
MCHC RBC AUTO-ENTMCNC: 32.1 G/DL (ref 31.5–35.7)
MCV RBC AUTO: 92 FL (ref 79–97)
MONOCYTES # BLD AUTO: 0.4 X10E3/UL (ref 0.1–0.9)
MONOCYTES NFR BLD AUTO: 6 %
NEUTROPHILS # BLD AUTO: 4.7 X10E3/UL (ref 1.4–7)
NEUTROPHILS NFR BLD AUTO: 69 %
PLATELET # BLD AUTO: 164 X10E3/UL (ref 150–450)
POTASSIUM SERPL-SCNC: 4 MMOL/L (ref 3.5–5.2)
PROT SERPL-MCNC: 6.6 G/DL (ref 6–8.5)
RBC # BLD AUTO: 4.15 X10E6/UL (ref 3.77–5.28)
SODIUM SERPL-SCNC: 142 MMOL/L (ref 134–144)
TRIGL SERPL-MCNC: 123 MG/DL (ref 0–149)
TSH SERPL DL<=0.005 MIU/L-ACNC: 2.55 UIU/ML (ref 0.45–4.5)
VLDLC SERPL CALC-MCNC: 22 MG/DL (ref 5–40)
WBC # BLD AUTO: 6.8 X10E3/UL (ref 3.4–10.8)

## 2024-06-19 NOTE — PROGRESS NOTES
[employer] message was sent   Good afternoon we have your lab back and per Dr. Broderick   You have normal white blood cell count, normal platelet count ( this is the blood clotting factor) and no signs of anemia.  You have normal liver, kidney and thyroid function, your electrolyte function is normal as well. Your glucose was 97 and your A1c (average blood sugar over 3 months) was 5.7 and this is in the pre-diabetes range. Pre diabetes is 5.7-6.4 and we need to check this again in 6 months to make sure that it has not increased more .  Your total cholesterol was 206 this has increased from the last time it was checked as it was 198, your triglycerides was at 123 this has decreased from the last time it was checked as it was 136, your HDL (healthy cholesterol) was 65 this has decreased from the last time it was checked as it was 66( we want this to be as high as we can get it and we do this by exercise, exercise), your LDL ( lousy cholesterol) was 119 this has increased from the last time it was checked as it was 108. Your total cholesterol/cardiac ratio was 3.2 this has increased from the last time as it was 3.0.  Continue to work on diet along with exercise and taking your medications as prescribed, and we will check labs again in a year.

## 2024-06-21 ENCOUNTER — TELEPHONE (OUTPATIENT)
Dept: NEUROLOGY | Facility: CLINIC | Age: 79
End: 2024-06-21
Payer: MEDICARE

## 2024-06-21 DIAGNOSIS — F02.80 EARLY ONSET ALZHEIMER'S DEMENTIA WITHOUT BEHAVIORAL DISTURBANCE: ICD-10-CM

## 2024-06-21 DIAGNOSIS — G30.0 EARLY ONSET ALZHEIMER'S DEMENTIA WITHOUT BEHAVIORAL DISTURBANCE: ICD-10-CM

## 2024-06-21 RX ORDER — RIVASTIGMINE 13.3 MG/24H
1 PATCH, EXTENDED RELEASE TRANSDERMAL DAILY
Qty: 90 PATCH | Refills: 3 | Status: SHIPPED | OUTPATIENT
Start: 2024-06-21

## 2024-06-21 NOTE — TELEPHONE ENCOUNTER
----- Message from [employer] sent at 6/21/2024  1:59 PM EDT -----  Regarding: Rivistigmine  Contact: [phone]  Hello,  Please send a prescription for the 13.3 mg/24 hr Rivastigmine to [facility] on [address]. [employer] doesn’t cover this med and [facility] has the best Good Rx price.     Thank you,   [place]ida  Salud’s daughter

## 2024-06-22 LAB
BACTERIA UR CULT: ABNORMAL
BACTERIA UR CULT: ABNORMAL
OTHER ANTIBIOTIC SUSC ISLT: ABNORMAL

## 2024-06-24 RX ORDER — SULFAMETHOXAZOLE AND TRIMETHOPRIM 800; 160 MG/1; MG/1
1 TABLET ORAL 2 TIMES DAILY
Qty: 20 TABLET | Refills: 0 | Status: SHIPPED | OUTPATIENT
Start: 2024-06-24

## 2024-06-24 NOTE — PROGRESS NOTES
Call patient  Good afternoon we have your urine culture back and per Dr. Broderick   Your urine culture did grow a bacteria, Dr. Broderick has sent bactrim antibiotic to the pharmacy for you.

## 2024-06-28 ENCOUNTER — HOSPITAL ENCOUNTER (OUTPATIENT)
Dept: BONE DENSITY | Facility: HOSPITAL | Age: 79
Discharge: HOME | End: 2024-06-28
Payer: MEDICARE

## 2024-06-28 DIAGNOSIS — M85.89 OSTEOPENIA OF MULTIPLE SITES: ICD-10-CM

## 2024-06-28 PROCEDURE — 77080 DXA BONE DENSITY AXIAL: CPT

## 2024-06-28 NOTE — PROGRESS NOTES
"[employer] message was sent   Good afternoon we have your dexa back and per Dr. Broderick   Your bones have come back moderately thinner. Your spine is at -1.2, your Femoral neck of the hip has come back at -3.2 and your Total hip has come back at -3.7. we can either staty the course and continue the calcium with Vitamin D 1200 units a day and we repeat this scan again in 2 years. We could also consider a injectable medication called Prolia that is given every 6 months. This does not stop the thinning of the bones but it helps even more to slow down the process of the thinning of the bones. If starting the injectable we need to still repeat the exam in 2 years. Either way would be fine to continue with treatment options.

## 2024-06-30 PROBLEM — R73.03 PREDIABETES: Status: ACTIVE | Noted: 2024-06-30

## 2024-09-03 NOTE — PROGRESS NOTES
"Chief Complaint  Follow-up (MEMORY)    Subjective          Salud Gama presents to [facility] for MEMORY  History of Present Illness  Patient is here to f/u on memory, she currently takes namenda 10 mg 1 bid  Also Exelon patch 13.3  Taking vit b - 12 and vit D  Pt lives with her 84 yo .      Maximum   Score Patient's   Score Questions   5 1 \"What is the year?Season?Date?Day of the week?Month?\"   5 2 \"Where are we now: State?County?Town/city?Hospital?Floor?\"   3 3 3 Unrelated objects Number of trials:___   5 5 Count backward from 100 by sevens or spell WORLD backwards   3 0 Name 3 things from above   2 2 Identify 2 objects   1 1 Repeat the phrase: No ifs, ands,or buts.   3 2 Take paper in right hand, fold it in half, and put it on the floor.   1 1  Please read this and do what it says. \"Close your eyes\"   1 1 Make up and write a sentence about anything. Noun and verb   1 0 Copy this picture 10 angles must be present.   30 18 Total MMSE             ====PREV. OV 8/30/23======  Medication- currently taking namenda 10 mg bid     Memory is a little worse since last visit.  She tolerated increase in rivastigmine dose            Maximum   Score Patient's   Score Questions   5 3 \"What is the year?Season?Date?Day of the week?Month?\"   5 3 \"Where are we now: State?County?Town/city?Hospital?Floor?\"   3 3 3 Unrelated objects Number of trials:___   5 5 Count backward from 100 by sevens or spell WORLD backwards   3 0 Name 3 things from above   2 2 Identify 2 objects   1 1 Repeat the phrase: No ifs, ands,or buts.   3 2 Take paper in right hand, fold it in half, and put it on the floor.   1 0 Please read this and do what it says. \"Close your eyes\"   1 1 Make up and write a sentence about anything. Noun and verb   1 1 Copy this picture 10 angles must be present.   30 21 Total MMSE                 Current Outpatient Medications:     alendronate (Fosamax) 70 MG tablet, Take 1 tablet by mouth " "Every 7 (Seven) Days., Disp: 12 tablet, Rfl: 3    Calcium Citrate 333 MG tablet, , Disp: , Rfl:     Cholecalciferol (VITAMIN D) 25 MCG (1000 UT) tablet, Take 1 tablet by mouth Daily., Disp: , Rfl:     citalopram (CeleXA) 20 MG tablet, Take 1 tablet by mouth Daily., Disp: 90 tablet, Rfl: 3    memantine (NAMENDA) 10 MG tablet, Take 1 tablet by mouth 2 (Two) Times a Day., Disp: 180 tablet, Rfl: 3    Myrbetriq 25 MG tablet sustained-release 24 hour 24 hr tablet, Take 1 tablet by mouth Daily., Disp: , Rfl:     omeprazole (priLOSEC) 20 MG capsule, TAKE 1 CAPSULE EVERY DAY, Disp: 90 capsule, Rfl: 3    Probiotic Product (PROBIOTIC-10 PO), Take 1 capsule by mouth Every Morning., Disp: , Rfl:     rivastigmine (EXELON) 13.3 MG/24HR patch, Place 1 patch on the skin as directed by provider Daily., Disp: 90 patch, Rfl: 3    vitamin B-12 (CYANOCOBALAMIN) 100 MCG tablet, Take 0.5 tablets by mouth Daily., Disp: , Rfl:     Review of Systems   Psychiatric/Behavioral:  Positive for confusion and decreased concentration. The patient is nervous/anxious.    All other systems reviewed and are negative.         Objective:    Vital Signs:   /75   Pulse 90   Ht 154.9 cm (61\")   Wt 66.2 kg (146 lb)   BMI 27.59 kg/m²     Physical Exam  Vitals reviewed.   Pulmonary:      Effort: Pulmonary effort is normal.   Neurological:      Mental Status: She is alert and oriented to person, place, and time.   Psychiatric:         Mood and Affect: Mood normal.        Result Review :                Neurologic Exam     Mental Status   Oriented to person, place, and time.         Assessment and Plan    Diagnoses and all orders for this visit:    1. Early onset Alzheimer's dementia without behavioral disturbance (Primary)  Overview:  22 of 30 06/18/2024 stable continue meds and neurology follow up  23 of 30 06/16/2023  22 of 30 on mental status exam 06/13/2022  28 of 30 On mental status exam 02.24.2021,   Her sxs remain stable per her " daughter.  Continue meds compliant      Other orders  -     COGNITIVE ASSESSMENT SCAN     Continue exelon and namenda  Pt now in safe assisted living. Doing better with medical treatment of diabetes, thyroid and breonna    Follow Up   Return in about 1 year (around 9/4/2025).  Patient was given instructions and counseling regarding her condition or for health maintenance advice. Please see specific information pulled into the AVS if appropriate.     This document has been electronically signed by Joseph Seipel, MD on September 4, 2024 11:40 EDT

## 2024-09-04 ENCOUNTER — OFFICE VISIT (OUTPATIENT)
Dept: NEUROLOGY | Facility: CLINIC | Age: 79
End: 2024-09-04
Payer: MEDICARE

## 2024-09-04 VITALS
WEIGHT: 146 LBS | BODY MASS INDEX: 27.56 KG/M2 | HEART RATE: 90 BPM | HEIGHT: 61 IN | DIASTOLIC BLOOD PRESSURE: 75 MMHG | SYSTOLIC BLOOD PRESSURE: 121 MMHG

## 2024-09-04 DIAGNOSIS — F02.80 EARLY ONSET ALZHEIMER'S DEMENTIA WITHOUT BEHAVIORAL DISTURBANCE: Primary | ICD-10-CM

## 2024-09-04 DIAGNOSIS — G30.0 EARLY ONSET ALZHEIMER'S DEMENTIA WITHOUT BEHAVIORAL DISTURBANCE: Primary | ICD-10-CM

## 2024-09-04 PROCEDURE — 1160F RVW MEDS BY RX/DR IN RCRD: CPT | Performed by: PSYCHIATRY & NEUROLOGY

## 2024-09-04 PROCEDURE — 99214 OFFICE O/P EST MOD 30 MIN: CPT | Performed by: PSYCHIATRY & NEUROLOGY

## 2024-09-04 PROCEDURE — 1159F MED LIST DOCD IN RCRD: CPT | Performed by: PSYCHIATRY & NEUROLOGY

## 2024-09-12 DIAGNOSIS — F41.9 ANXIETY: ICD-10-CM

## 2024-09-13 RX ORDER — CITALOPRAM HYDROBROMIDE 20 MG/1
20 TABLET ORAL DAILY
Qty: 90 TABLET | Refills: 3 | Status: SHIPPED | OUTPATIENT
Start: 2024-09-13

## 2024-09-24 ENCOUNTER — TELEPHONE (OUTPATIENT)
Dept: FAMILY MEDICINE CLINIC | Facility: CLINIC | Age: 79
End: 2024-09-24
Payer: MEDICARE

## 2024-11-01 DIAGNOSIS — F02.80 EARLY ONSET ALZHEIMER'S DEMENTIA WITHOUT BEHAVIORAL DISTURBANCE: ICD-10-CM

## 2024-11-01 DIAGNOSIS — G30.0 EARLY ONSET ALZHEIMER'S DEMENTIA WITHOUT BEHAVIORAL DISTURBANCE: ICD-10-CM

## 2024-11-01 RX ORDER — MEMANTINE HYDROCHLORIDE 10 MG/1
10 TABLET ORAL 2 TIMES DAILY
Qty: 180 TABLET | Refills: 3 | Status: SHIPPED | OUTPATIENT
Start: 2024-11-01

## 2024-12-05 ENCOUNTER — TELEPHONE (OUTPATIENT)
Dept: NEUROLOGY | Facility: CLINIC | Age: 79
End: 2024-12-05
Payer: MEDICARE

## 2024-12-05 NOTE — TELEPHONE ENCOUNTER
MEDICAL RECORDS RELEASE SCANNED INTO [employer], NO FAX # OR ADDRESS TO SEND.  HUB OK TO RELAY MESSAGE

## (undated) DEVICE — GLV SURG SIGNATURE ESSENTIAL PF LTX SZ8

## (undated) DEVICE — NEEDLE, QUINCKE, 20GX3.5": Brand: MEDLINE

## (undated) DEVICE — RECIPROCATING BLADE, DOUBLE SIDED, OFFSET  (70.0 X 0.64 X 12.6MM)

## (undated) DEVICE — DRAPE,U/ SHT,SPLIT,PLAS,STERIL: Brand: MEDLINE

## (undated) DEVICE — APPL CHLORAPREP HI/LITE 26ML ORNG

## (undated) DEVICE — SUT VIC 0 CT1 36IN J946H

## (undated) DEVICE — ADHS SKIN DERMABOND TOP ADVANCED

## (undated) DEVICE — THREADED PINS PACK
Type: IMPLANTABLE DEVICE | Site: KNEE | Status: NON-FUNCTIONAL
Brand: KNEE INSTRUMENTS
Removed: 2020-07-29

## (undated) DEVICE — GLV SURG BIOGEL LTX PF 8

## (undated) DEVICE — STRAP STIRUP WO/ RNG

## (undated) DEVICE — BNDG ELAS ELITE V/CLOSE 6IN 5YD LF STRL

## (undated) DEVICE — ANTIBACTERIAL UNDYED BRAIDED (POLYGLACTIN 910), SYNTHETIC ABSORBABLE SUTURE: Brand: COATED VICRYL

## (undated) DEVICE — STPLR SKIN VISISTAT WD 35CT

## (undated) DEVICE — MAT FLR ABSORBENT LG 4FT 10 2.5FT

## (undated) DEVICE — TBG PENCL TELESCP MEGADYNE SMOKE EVAC 10FT

## (undated) DEVICE — 3M™ IOBAN™ 2 ANTIMICROBIAL INCISE DRAPE 6640EZ: Brand: IOBAN™ 2

## (undated) DEVICE — SOL ISO/ALC RUB 70PCT 4OZ

## (undated) DEVICE — TRAP FLD MINIVAC MEGADYNE 100ML

## (undated) DEVICE — PK KN TOTL 40

## (undated) DEVICE — PENCL E/S ULTRAVAC TELESCP NOSE HOLSTR 10FT

## (undated) DEVICE — ADHS SKIN SURG TISS VISC PREMIERPRO EXOFIN HI/VISC FAST/DRY

## (undated) DEVICE — GLV SURG PREMIERPRO ORTHO LTX PF SZ8 BRN

## (undated) DEVICE — SOL IRR NACL 0.9PCT 3000ML

## (undated) DEVICE — SUT ETHIB 1 CT1 30IN  X425H

## (undated) DEVICE — BNDG ELAS ELITE V/CLOSE 4IN 5YD LF STRL

## (undated) DEVICE — SCREWS PACK
Type: IMPLANTABLE DEVICE | Site: KNEE | Status: NON-FUNCTIONAL
Brand: KNEE INSTRUMENTS
Removed: 2020-07-29

## (undated) DEVICE — DECANT BG O JET

## (undated) DEVICE — SWORD PIN PACK
Type: IMPLANTABLE DEVICE | Site: KNEE | Status: NON-FUNCTIONAL
Brand: KNEE INSTRUMENTS
Removed: 2020-07-29

## (undated) DEVICE — STERILE PATIENT PROTECTIVE PAD FOR IMP® KNEE POSITIONERS & COHESIVE WRAP (10 / CASE): Brand: DE MAYO KNEE POSITIONER®

## (undated) DEVICE — DRSNG SLVR/ANTIBAC PRIMASEAL POST/OP ADHS 3.5X12IN

## (undated) DEVICE — TRY SKINPREP DRYPREP